# Patient Record
Sex: FEMALE | Race: WHITE | Employment: OTHER | URBAN - METROPOLITAN AREA
[De-identification: names, ages, dates, MRNs, and addresses within clinical notes are randomized per-mention and may not be internally consistent; named-entity substitution may affect disease eponyms.]

---

## 2017-01-04 ENCOUNTER — TELEPHONE (OUTPATIENT)
Dept: SURGERY | Facility: CLINIC | Age: 42
End: 2017-01-04

## 2017-01-04 NOTE — TELEPHONE ENCOUNTER
Patient states that Dr. Princess Mora was going to coordinate surgery with another surgeon.   Per last OV notes, Dr. Princess Mora was to contact Dr. Sofiya Cabrera to get surgery recommendations and get scheduled  Informed patient that I would address this with Dr. Princess Mora

## 2017-01-05 ENCOUNTER — TELEPHONE (OUTPATIENT)
Dept: INTERNAL MEDICINE CLINIC | Facility: CLINIC | Age: 42
End: 2017-01-05

## 2017-01-05 NOTE — TELEPHONE ENCOUNTER
Informed patient that per Dr. Connie Quintanilla, Dr. Rosi Mims does not do the surgery  He is still waiting for Op report from [facility], she states Dr. Henry Meléndez had copy  Nothing seen in patient's chart    Also informed her that per Pain service, all injections have bee

## 2017-01-06 ENCOUNTER — TELEPHONE (OUTPATIENT)
Dept: SURGERY | Facility: CLINIC | Age: 42
End: 2017-01-06

## 2017-01-06 DIAGNOSIS — M46.1 SACROILIITIS, NOT ELSEWHERE CLASSIFIED (HCC): Primary | ICD-10-CM

## 2017-01-06 DIAGNOSIS — M47.819 SPONDYLOSIS WITHOUT MYELOPATHY: ICD-10-CM

## 2017-01-06 DIAGNOSIS — M47.816 FACET ARTHROPATHY, LUMBAR: ICD-10-CM

## 2017-01-06 NOTE — TELEPHONE ENCOUNTER
Patient's bilateral SIJI rescheduled for 2/6/17 at 12:45PM. Patient prefers sedation, pre-procedural instructions reviewed. Patient verbalized understanding. Patient would like Zeb SIJI completed 1/9/17 followed by Left LFJI 1/16/17 and Right LFJI 2/6/17. ? 81mg day of procedure  ? Greater than 81 mg (325mg) 7 days  ? Coumadin Procedure may be cancelled if INR is elevated. ? Epidural ____ - 7 days  ? Others 5 days  ? Excedrin (with aspirin) 7 days  ? Plavix (Clopidogrel)  ? Epidural ____ - 10 days  ?  Othe

## 2017-01-06 NOTE — TELEPHONE ENCOUNTER
Reviewed chart, patient never had SI joint injections  Discussed with Pain Service, they will contact patient to get injections rescheduled.      Patient states she has copies of her medical records from [facility] and will be dropping them off this morning  I in

## 2017-01-09 ENCOUNTER — SURGERY (OUTPATIENT)
Age: 42
End: 2017-01-09

## 2017-01-09 ENCOUNTER — APPOINTMENT (OUTPATIENT)
Dept: GENERAL RADIOLOGY | Facility: HOSPITAL | Age: 42
End: 2017-01-09
Attending: ANESTHESIOLOGY
Payer: MEDICARE

## 2017-01-11 ENCOUNTER — TELEPHONE (OUTPATIENT)
Dept: INTERNAL MEDICINE CLINIC | Facility: CLINIC | Age: 42
End: 2017-01-11

## 2017-01-11 ENCOUNTER — PATIENT OUTREACH (OUTPATIENT)
Dept: INTERNAL MEDICINE CLINIC | Facility: CLINIC | Age: 42
End: 2017-01-11

## 2017-01-11 DIAGNOSIS — I10 ESSENTIAL HYPERTENSION: Primary | ICD-10-CM

## 2017-01-11 RX ORDER — CHLORTHALIDONE 25 MG/1
12.5 TABLET ORAL DAILY
Qty: 15 TABLET | Refills: 1 | Status: SHIPPED | OUTPATIENT
Start: 2017-01-11 | End: 2017-03-03

## 2017-01-11 NOTE — TELEPHONE ENCOUNTER
Spoke with pt for the last week her blood pressure has been elevated. Readings as follows:  's-170's and 's-110's. Pulse has been . Pt also reports headache and lightheadedness.   She is currently taking Amlodipine 10mg daily and elidia

## 2017-01-11 NOTE — TELEPHONE ENCOUNTER
PT STATES SHE HAS BEEN HAVING ELEVATED BLOOD PRESSURE READINGS WONDERING IF SHE NEEDS MEDICATION ADJUSTED. PLEASE ADVISE.

## 2017-01-16 ENCOUNTER — SURGERY (OUTPATIENT)
Age: 42
End: 2017-01-16

## 2017-01-16 ENCOUNTER — APPOINTMENT (OUTPATIENT)
Dept: GENERAL RADIOLOGY | Facility: HOSPITAL | Age: 42
End: 2017-01-16
Attending: ANESTHESIOLOGY
Payer: MEDICARE

## 2017-01-18 ENCOUNTER — TELEPHONE (OUTPATIENT)
Dept: INTERNAL MEDICINE CLINIC | Facility: CLINIC | Age: 42
End: 2017-01-18

## 2017-01-18 ENCOUNTER — TELEPHONE (OUTPATIENT)
Dept: SURGERY | Facility: CLINIC | Age: 42
End: 2017-01-18

## 2017-01-18 ENCOUNTER — TELEPHONE (OUTPATIENT)
Dept: SURGERY | Facility: HOSPITAL | Age: 42
End: 2017-01-18

## 2017-01-18 DIAGNOSIS — M79.5 FOREIGN BODY (FB) IN SOFT TISSUE: Primary | ICD-10-CM

## 2017-01-18 NOTE — TELEPHONE ENCOUNTER
Patient LM on VM stating that she broke out in hives and asthma; she came in contact with shellfish and seafood. Please advise.

## 2017-01-18 NOTE — TELEPHONE ENCOUNTER
Spoke with dr Maverick De La Paz  Pt to see him in consult  Spoke with pt  His office will call her to set up

## 2017-01-18 NOTE — TELEPHONE ENCOUNTER
Spoke with patient and she c/o difficulty breathing, SOB and hives all over body after eating seafood on Sunday. States her breathing problems are worse today, and hives are extremely itchy.      Patient advised to proceed to the emergency room as soon as p

## 2017-01-20 ENCOUNTER — OFFICE VISIT (OUTPATIENT)
Dept: INTERNAL MEDICINE CLINIC | Facility: CLINIC | Age: 42
End: 2017-01-20

## 2017-01-20 ENCOUNTER — APPOINTMENT (OUTPATIENT)
Dept: LAB | Age: 42
End: 2017-01-20
Attending: COLON & RECTAL SURGERY
Payer: MEDICARE

## 2017-01-20 ENCOUNTER — HOSPITAL ENCOUNTER (OUTPATIENT)
Dept: GENERAL RADIOLOGY | Age: 42
Discharge: HOME | End: 2017-01-20
Attending: COLON & RECTAL SURGERY
Payer: MEDICARE

## 2017-01-20 ENCOUNTER — TELEPHONE (OUTPATIENT)
Dept: INTERNAL MEDICINE CLINIC | Facility: CLINIC | Age: 42
End: 2017-01-20

## 2017-01-20 VITALS
HEIGHT: 64 IN | DIASTOLIC BLOOD PRESSURE: 76 MMHG | OXYGEN SATURATION: 97 % | HEART RATE: 80 BPM | SYSTOLIC BLOOD PRESSURE: 124 MMHG | TEMPERATURE: 98 F | WEIGHT: 246 LBS | RESPIRATION RATE: 16 BRPM | BODY MASS INDEX: 42 KG/M2

## 2017-01-20 DIAGNOSIS — E78.5 HYPERLIPIDEMIA WITH TARGET LDL LESS THAN 130: ICD-10-CM

## 2017-01-20 DIAGNOSIS — I10 ESSENTIAL HYPERTENSION: ICD-10-CM

## 2017-01-20 DIAGNOSIS — Z00.00 ENCOUNTER FOR ANNUAL HEALTH EXAMINATION: Primary | ICD-10-CM

## 2017-01-20 DIAGNOSIS — M79.5 FOREIGN BODY (FB) IN SOFT TISSUE: ICD-10-CM

## 2017-01-20 DIAGNOSIS — I10 ESSENTIAL HYPERTENSION: Primary | ICD-10-CM

## 2017-01-20 LAB
ALBUMIN SERPL-MCNC: 3.5 G/DL (ref 3.5–4.8)
ALP LIVER SERPL-CCNC: 93 U/L (ref 37–98)
ALT SERPL-CCNC: 33 U/L (ref 14–54)
AST SERPL-CCNC: 22 U/L (ref 15–41)
BILIRUB SERPL-MCNC: 0.2 MG/DL (ref 0.1–2)
BILIRUB UR QL STRIP.AUTO: NEGATIVE
BUN BLD-MCNC: 30 MG/DL (ref 8–20)
CALCIUM BLD-MCNC: 8.6 MG/DL (ref 8.3–10.3)
CHLORIDE: 106 MMOL/L (ref 101–111)
CHOLEST SMN-MCNC: 193 MG/DL (ref ?–200)
CO2: 25 MMOL/L (ref 22–32)
COLOR UR AUTO: YELLOW
CREAT BLD-MCNC: 1.13 MG/DL (ref 0.55–1.02)
GLUCOSE BLD-MCNC: 88 MG/DL (ref 70–99)
GLUCOSE UR STRIP.AUTO-MCNC: NEGATIVE MG/DL
HDLC SERPL-MCNC: 65 MG/DL (ref 45–?)
HDLC SERPL: 2.97 {RATIO} (ref ?–4.44)
KETONES UR STRIP.AUTO-MCNC: NEGATIVE MG/DL
LDLC SERPL CALC-MCNC: 87 MG/DL (ref ?–130)
LEUKOCYTE ESTERASE UR QL STRIP.AUTO: NEGATIVE
M PROTEIN MFR SERPL ELPH: 7 G/DL (ref 6.1–8.3)
NITRITE UR QL STRIP.AUTO: NEGATIVE
NONHDLC SERPL-MCNC: 128 MG/DL (ref ?–130)
PH UR STRIP.AUTO: 6 [PH] (ref 4.5–8)
POTASSIUM SERPL-SCNC: 3.7 MMOL/L (ref 3.6–5.1)
PROT UR STRIP.AUTO-MCNC: NEGATIVE MG/DL
RBC UR QL AUTO: NEGATIVE
SODIUM SERPL-SCNC: 139 MMOL/L (ref 136–144)
SP GR UR STRIP.AUTO: 1.02 (ref 1–1.03)
TRIGLYCERIDES: 207 MG/DL (ref ?–150)
UROBILINOGEN UR STRIP.AUTO-MCNC: <2 MG/DL
VLDL: 41 MG/DL (ref 5–40)

## 2017-01-20 PROCEDURE — 80061 LIPID PANEL: CPT

## 2017-01-20 PROCEDURE — 80053 COMPREHEN METABOLIC PANEL: CPT

## 2017-01-20 PROCEDURE — 74020 XR ABDOMEN, OBSTRUCTIVE SERIES (CPT=74020): CPT

## 2017-01-20 PROCEDURE — G0439 PPPS, SUBSEQ VISIT: HCPCS | Performed by: PHYSICIAN ASSISTANT

## 2017-01-20 PROCEDURE — 81003 URINALYSIS AUTO W/O SCOPE: CPT

## 2017-01-20 PROCEDURE — 36415 COLL VENOUS BLD VENIPUNCTURE: CPT

## 2017-01-20 PROCEDURE — G0444 DEPRESSION SCREEN ANNUAL: HCPCS | Performed by: PHYSICIAN ASSISTANT

## 2017-01-20 NOTE — PROGRESS NOTES
HPI:   Claire Vela is a 37year old female who presents for a Medicare Subsequent Annual Wellness visit (Pt already had Initial Annual Wellness). Pt had allergic reaction earlier this week to shellfish, is feeling better now.  Asthma stil neurosurgery and consult with general surgery 1/23 to plan surgical removal     Abnormal CT of the abdomen- d/t suspected foreign body from prior surgery; has discussed with neurosurgery and consult with general surgery 1/23 to plan surgical removal      L disorder; Family history of congenital heart defect (8/29/2014); and Asthma.     She  has past surgical history that includes back surgery (03/17/2010); cholecystectomy (2008); d & c (10/2009 & 03/2010); appendectomy; removal of kidney stone; other (1/25/20 Normocephalic, without obvious abnormality, atraumatic   Eyes:  PERRL, conjunctiva/corneas clear, EOM's intact both eyes   Ears:  Normal TM's and external ear canals, both ears   Nose: Nares normal, septum midline,mucosa normal, no drainage or sinus tender Alejandra Epps is unable to use daily aspirin therapy For the following reasons:   Regular use of NSAIDs          Diet assessment: good     Advanced Directive:  Living Will on file in [facility]? Sanam Valdez does not have a Living Will on file in [facility]  Erica Mariee plan.    Return in about 6 weeks (around 3/3/2017). for BP follow-up and asthma recheck.      Joy Mishra PA-C, 1/20/2017     General Health     In the past six months, have you lost more than 10 pounds without trying?: 2 - No    Has your appetite be pleasure in doing things (over the last two weeks)?: Several days    Feeling down, depressed, or hopeless (over the last two weeks)?: Several days    PHQ-2 SCORE: 2        Advance Directives     Do you have a healthcare power of ?: Yes    Do you ha previous visit. No flowsheet data found.     Pap and Pelvic      Pap: Every 3 yrs age 21-65 or Pap+HPV every 5 yrs age 33-67, age 72 and older at high risk Pap Smear,5 Years due on 05/09/2021 Update Health Maintenance if applicable    Chlamydia  Annually if Annually      LDL  Annually LDL CHOLESTEROL (mg/dL)   Date Value   12/14/2015 135*     LDL CHOLESTROL (mg/dL)   Date Value   01/07/2013 146*    No flowsheet data found. Dilated Eye exam  Annually No flowsheet data found. No flowsheet data found.     CO

## 2017-01-23 ENCOUNTER — TELEPHONE (OUTPATIENT)
Dept: INTERNAL MEDICINE CLINIC | Facility: CLINIC | Age: 42
End: 2017-01-23

## 2017-01-23 ENCOUNTER — OFFICE VISIT (OUTPATIENT)
Dept: SURGERY | Facility: CLINIC | Age: 42
End: 2017-01-23

## 2017-01-23 VITALS — BODY MASS INDEX: 42.68 KG/M2 | WEIGHT: 250 LBS | HEIGHT: 64 IN

## 2017-01-23 DIAGNOSIS — G89.29 ABDOMINAL PAIN, CHRONIC, LEFT LOWER QUADRANT: ICD-10-CM

## 2017-01-23 DIAGNOSIS — R93.5 ABNORMAL CT OF THE ABDOMEN: ICD-10-CM

## 2017-01-23 DIAGNOSIS — M79.5 FOREIGN BODY (FB) IN SOFT TISSUE: Primary | ICD-10-CM

## 2017-01-23 DIAGNOSIS — R10.32 ABDOMINAL PAIN, CHRONIC, LEFT LOWER QUADRANT: ICD-10-CM

## 2017-01-23 DIAGNOSIS — R79.89 ELEVATED SERUM CREATININE: Primary | ICD-10-CM

## 2017-01-23 PROCEDURE — 99215 OFFICE O/P EST HI 40 MIN: CPT | Performed by: COLON & RECTAL SURGERY

## 2017-01-23 NOTE — H&P
New Patient Visit Note       Active Problems      1. Foreign body (FB) in soft tissue    2. Abnormal CT of the abdomen    3.  Abdominal pain, chronic, left lower quadrant        Chief Complaint   Back Pain    History of Present Illness   I have been asked t placed at the time of that operation. There appears to be calcifications within the soft tissues that extend onto the level of the anterior psoas muscle. There is no obvious identifier for a laparotomy sponge or Ray-Jerald.   There is evidence of new bone fo Bowel gas pattern is non-obstructive. No joint spinal stimulator device. Postoperative changes with lumbosacral fusion. There is slight irregularity noted along the left aspect of the L5 vertebral body.  This could be postsurgical in nature but could   ,     Comment x2    TUBAL LIGATION      OTHER  16    Comment REPLACEMENT OF SPINAL CORD STIMULATOR GENERATOR       The family history and social history have been reviewed by me today.     Family History   Problem Relation Age of Ons mg by mouth every 6 (six) hours as needed for Pain. Disp:  Rfl:          Review of Systems  The Review of Systems has been reviewed by me during today.   Review of Systems   Constitutional: Negative for fever, chills, diaphoresis, fatigue and unexpected pipe significant past medical and surgical history. There is the possibility of a retained sponge that is in the retroperitoneum.   There is concern that the mobilization of the bowel and the disruption of the mesentery necessary to retrieve or explore this reg visit.  I have personally reviewed the obstructive series that was performed at [facility].  This test was obtained on January 20, 2017. It shows no evidence of a radiologic marker that is embedded on all surgical sponges and Ray-Jerald.   I provided my SPINE (3 VIEWS) (CPT=72072)  XR LUMBAR SPINE (MIN 4 VIEWS) (CPT=72110)    Follow Up  Return if symptoms worsen or fail to improve.     Ora Cowden, MD

## 2017-01-23 NOTE — TELEPHONE ENCOUNTER
----- Message from Papi Rai PA-C sent at 1/23/2017  9:51 AM CST -----  Pt informed via [employer]. Please place order for BMP to be repeated in 6 wks.

## 2017-01-23 NOTE — PATIENT INSTRUCTIONS
I have been asked to consult on this patient by the neurosurgery service and the primary care service. I am seeing this patient in the role of a colon and rectal surgeon. This patient presents with a very significant past medical and surgical history. of new bone formation and abnormal regions most likely based on the material that was used to induce a bone graft at the site of her spinal surgery. I personally ordered an obstructive series prior to this visit.   I have personally reviewed the obstruct She will only need a follow-up visit if she has evidence of a retained surgical device.

## 2017-01-23 NOTE — TELEPHONE ENCOUNTER
D/w pt results:  cholesterol is fairly well controlled (watch fried/greasy foods for elevated triglycerides). Your urinalysis is unremarkable, and your metabolic panel looks good other than a slightly elevated creatinine level (kidney function).  This could

## 2017-01-24 ENCOUNTER — PATIENT OUTREACH (OUTPATIENT)
Dept: CASE MANAGEMENT | Age: 42
End: 2017-01-24

## 2017-01-24 DIAGNOSIS — J45.50 UNCOMPLICATED SEVERE PERSISTENT ASTHMA: ICD-10-CM

## 2017-01-24 DIAGNOSIS — I10 ESSENTIAL HYPERTENSION: Primary | ICD-10-CM

## 2017-01-24 DIAGNOSIS — E78.5 HYPERLIPIDEMIA WITH TARGET LDL LESS THAN 130: ICD-10-CM

## 2017-01-24 PROCEDURE — 99490 CHRNC CARE MGMT STAFF 1ST 20: CPT

## 2017-01-24 NOTE — PROGRESS NOTES
Spoke to pt for CCM today. Pt states she has been very busy especially on Monday and Tuesday. Pt states she is leaving to take her son to meeting. Advised pt will call back another time. 01/27/17 Spoke to pt as f/up for CCM today.   Pt states she is d

## 2017-02-06 ENCOUNTER — TELEPHONE (OUTPATIENT)
Dept: SURGERY | Facility: CLINIC | Age: 42
End: 2017-02-06

## 2017-02-06 ENCOUNTER — APPOINTMENT (OUTPATIENT)
Dept: GENERAL RADIOLOGY | Facility: HOSPITAL | Age: 42
End: 2017-02-06
Attending: ANESTHESIOLOGY
Payer: MEDICARE

## 2017-02-06 ENCOUNTER — SURGERY (OUTPATIENT)
Age: 42
End: 2017-02-06

## 2017-02-06 ENCOUNTER — HOSPITAL ENCOUNTER (OUTPATIENT)
Facility: HOSPITAL | Age: 42
Setting detail: HOSPITAL OUTPATIENT SURGERY
Discharge: HOME | End: 2017-02-06
Attending: ANESTHESIOLOGY | Admitting: ANESTHESIOLOGY
Payer: MEDICARE

## 2017-02-06 VITALS
HEART RATE: 80 BPM | OXYGEN SATURATION: 95 % | DIASTOLIC BLOOD PRESSURE: 58 MMHG | SYSTOLIC BLOOD PRESSURE: 101 MMHG | TEMPERATURE: 98 F | RESPIRATION RATE: 18 BRPM

## 2017-02-06 DIAGNOSIS — M47.819 SPONDYLOSIS WITHOUT MYELOPATHY: ICD-10-CM

## 2017-02-06 DIAGNOSIS — M47.816 FACET ARTHROPATHY, LUMBAR: ICD-10-CM

## 2017-02-06 LAB
POCT LOT NUMBER: NORMAL
POCT URINE PREGNANCY: NEGATIVE

## 2017-02-06 PROCEDURE — 81025 URINE PREGNANCY TEST: CPT | Performed by: ANESTHESIOLOGY

## 2017-02-06 PROCEDURE — 3E0U3BZ INTRODUCTION OF ANESTHETIC AGENT INTO JOINTS, PERCUTANEOUS APPROACH: ICD-10-PCS | Performed by: ANESTHESIOLOGY

## 2017-02-06 PROCEDURE — 99152 MOD SED SAME PHYS/QHP 5/>YRS: CPT | Performed by: ANESTHESIOLOGY

## 2017-02-06 PROCEDURE — 3E0U33Z INTRODUCTION OF ANTI-INFLAMMATORY INTO JOINTS, PERCUTANEOUS APPROACH: ICD-10-PCS | Performed by: ANESTHESIOLOGY

## 2017-02-06 RX ORDER — METHYLPREDNISOLONE ACETATE 40 MG/ML
INJECTION, SUSPENSION INTRA-ARTICULAR; INTRALESIONAL; INTRAMUSCULAR; SOFT TISSUE AS NEEDED
Status: DISCONTINUED | OUTPATIENT
Start: 2017-02-06 | End: 2017-02-06 | Stop reason: HOSPADM

## 2017-02-06 RX ORDER — LIDOCAINE HYDROCHLORIDE 10 MG/ML
INJECTION, SOLUTION EPIDURAL; INFILTRATION; INTRACAUDAL; PERINEURAL AS NEEDED
Status: DISCONTINUED | OUTPATIENT
Start: 2017-02-06 | End: 2017-02-06 | Stop reason: HOSPADM

## 2017-02-06 RX ORDER — SODIUM CHLORIDE, SODIUM LACTATE, POTASSIUM CHLORIDE, CALCIUM CHLORIDE 600; 310; 30; 20 MG/100ML; MG/100ML; MG/100ML; MG/100ML
100 INJECTION, SOLUTION INTRAVENOUS CONTINUOUS
Status: DISCONTINUED | OUTPATIENT
Start: 2017-02-06 | End: 2017-02-06

## 2017-02-06 NOTE — TELEPHONE ENCOUNTER
Patient notified me in procedure area that she needs a refill of Norco, I informed her I would pass along her request. She is aware of the 48 hour turnaround. Would you please begin the process.    Thanks

## 2017-02-06 NOTE — H&P
History & Physical Examination    Patient Name: Estrella Solis  MRN: [id number]  CSN: [id number]  YOB: 1975    Pre-Operative Diagnosis:  Spondylosis without myelopathy [M19.90]  Facet arthropathy, lumbar [M12.88]    Present Illness: history of congenital heart defect 8/29/2014     Father of baby with congenital heart defect. Plan fetal echo 22-24 weeks.     • Asthma          Past Surgical History    BACK SURGERY  03/17/2010    Comment L4-L5, L5-S1 anterior lumbar interbody fusion    [facility]

## 2017-02-06 NOTE — TELEPHONE ENCOUNTER
Patient informed of 48 hour refill policy excluding weekends and holidays. Further explained patient will not receive a call back once prescription is ready. Patient will  medication at [place] office, [address].

## 2017-02-06 NOTE — OPERATIVE REPORT
[facility]  Operative Report  2017     Marlon Li Patient Status:  Hospital Outpatient Surgery    1975 MRN [id number]   Location [address] [place] [address] Attending No att. providers found   Hosp Day # 0 needle was introduced and advanced into each corresponding facet joint at left L2-L3, L3-L4, L4-5 and L5-S1 level atraumatically under fluoroscopic guidance.   Following negative aspiration for CSF and blood, approximately 1 mL of 1% lidocaine with 20 mg of

## 2017-02-07 NOTE — TELEPHONE ENCOUNTER
Medication: Hydrocodone-Acetaminophen  MG    Date of last refill: 11/28/16  [place] Review[place] 7/40/17    Last office visit: 11/28/16  Due back to clinic per last office note:  3 months  Date next office visit schedu

## 2017-02-08 RX ORDER — HYDROCODONE BITARTRATE AND ACETAMINOPHEN 10; 325 MG/1; MG/1
1 TABLET ORAL EVERY 6 HOURS PRN
Qty: 90 TABLET | Refills: 0 | Status: SHIPPED | OUTPATIENT
Start: 2017-02-08 | End: 2017-03-27

## 2017-02-16 ENCOUNTER — PATIENT OUTREACH (OUTPATIENT)
Dept: CASE MANAGEMENT | Age: 42
End: 2017-02-16

## 2017-02-16 DIAGNOSIS — I10 ESSENTIAL HYPERTENSION: Primary | ICD-10-CM

## 2017-02-16 DIAGNOSIS — J45.50 UNCOMPLICATED SEVERE PERSISTENT ASTHMA: ICD-10-CM

## 2017-02-16 DIAGNOSIS — E78.5 HYPERLIPIDEMIA WITH TARGET LDL LESS THAN 130: ICD-10-CM

## 2017-02-16 PROCEDURE — 99490 CHRNC CARE MGMT STAFF 1ST 20: CPT

## 2017-02-16 NOTE — PROGRESS NOTES
Spoke to pt for CCM today. Pt states she is doing ok. Pt states has an appt with  today for check up on her asthma. Pt states her surgery with Juliana Drop is postponed until after tax season as her spouse does that for a living.   Pt states she

## 2017-02-22 ENCOUNTER — TELEPHONE (OUTPATIENT)
Dept: SURGERY | Facility: CLINIC | Age: 42
End: 2017-02-22

## 2017-02-22 RX ORDER — METHOCARBAMOL 500 MG/1
500 TABLET, FILM COATED ORAL EVERY 8 HOURS PRN
Qty: 45 TABLET | Refills: 0 | Status: SHIPPED | OUTPATIENT
Start: 2017-02-22 | End: 2017-03-24

## 2017-02-22 NOTE — TELEPHONE ENCOUNTER
I have sent a script for methocarbamol 500mg one tablet every 8 hours if needed for muscle spasm to her pharmacy, otherwise I agree that she should meet with Mikey Fernández to see what else he can do.

## 2017-02-22 NOTE — TELEPHONE ENCOUNTER
If she got excellent  Relief of this pain with the sacroiliac joint injection, but now the pain is starting to return,I would recommend RFA right SI joint. Would you ask her the percentage of relief she obtained after SI joint injection and for how long.  I

## 2017-02-22 NOTE — TELEPHONE ENCOUNTER
Spoke with patient about provider recommendations below. Patient states she had minimal relief from [facility], states maybe 10% if that. Spoke with A. Donnella Buerger, who advised to have patient contact [employer] for assistance with SCS, as well as see about coming i

## 2017-02-22 NOTE — TELEPHONE ENCOUNTER
Patient reporting lower back pain, right above tailbone and to the right, radiating into right leg with muscle spasms/cramps into right leg. Patient states these are recurrent symptoms but now more intense. Symptoms worsening over last week.  Has increased

## 2017-02-23 NOTE — TELEPHONE ENCOUNTER
Pt scheduled fup appt 2/24 with Dr Larry Moura in [place] [id number]- reexperiencing back pain after 2/6 injection

## 2017-02-24 ENCOUNTER — OFFICE VISIT (OUTPATIENT)
Dept: SURGERY | Facility: CLINIC | Age: 42
End: 2017-02-24

## 2017-02-24 VITALS
HEART RATE: 66 BPM | WEIGHT: 250 LBS | BODY MASS INDEX: 42.68 KG/M2 | DIASTOLIC BLOOD PRESSURE: 72 MMHG | RESPIRATION RATE: 16 BRPM | SYSTOLIC BLOOD PRESSURE: 120 MMHG | HEIGHT: 64 IN

## 2017-02-24 DIAGNOSIS — M54.16 LUMBAR RADICULOPATHY: Primary | ICD-10-CM

## 2017-02-24 PROCEDURE — 99214 OFFICE O/P EST MOD 30 MIN: CPT | Performed by: ANESTHESIOLOGY

## 2017-02-24 RX ORDER — TIOTROPIUM BROMIDE INHALATION SPRAY 1.56 UG/1
SPRAY, METERED RESPIRATORY (INHALATION)
Refills: 0 | COMMUNITY
Start: 2017-02-17 | End: 2018-08-31

## 2017-02-24 NOTE — PATIENT INSTRUCTIONS
Refill policies:    • Allow 2 business days for refills; controlled substances may take longer.   • Contact your pharmacy at least 5 days prior to running out of medication and have them send an electronic request or submit request through the “request re your physician has recommended that you have a procedure or additional testing performed. [facility] will contact your insurance carrier to obtain pre-certification or prior authorization.     Unfortunately, [facility] has seen an increas tori.    Medication:   Number of days you need to be off for the following medications:  • Aggrenox 10 days   • Agrylin (Anagrelide) 10 days   • Enbrel (Etanercept) 24 hours   • Fragmin (Dalteparin) 24 hours   • Pletal (Cilostazol) 7 days  • Pradaxa 10 da your primary care physician if your blood sugar rises as you may require some medication adjustment.   It is normal to have increased pain at injection site for up to 3-5 days after procedure, you can use heat for the first 24 hours (20 minutes on 20 minute

## 2017-03-03 ENCOUNTER — APPOINTMENT (OUTPATIENT)
Dept: LAB | Age: 42
End: 2017-03-03
Attending: PHYSICIAN ASSISTANT
Payer: MEDICARE

## 2017-03-03 ENCOUNTER — OFFICE VISIT (OUTPATIENT)
Dept: INTERNAL MEDICINE CLINIC | Facility: CLINIC | Age: 42
End: 2017-03-03

## 2017-03-03 VITALS
SYSTOLIC BLOOD PRESSURE: 120 MMHG | HEIGHT: 64 IN | BODY MASS INDEX: 42.68 KG/M2 | TEMPERATURE: 98 F | DIASTOLIC BLOOD PRESSURE: 82 MMHG | WEIGHT: 250 LBS | RESPIRATION RATE: 16 BRPM | HEART RATE: 92 BPM

## 2017-03-03 DIAGNOSIS — R79.89 ELEVATED SERUM CREATININE: ICD-10-CM

## 2017-03-03 DIAGNOSIS — F41.1 GENERALIZED ANXIETY DISORDER: ICD-10-CM

## 2017-03-03 DIAGNOSIS — I10 ESSENTIAL HYPERTENSION: Primary | ICD-10-CM

## 2017-03-03 DIAGNOSIS — J45.50 UNCOMPLICATED SEVERE PERSISTENT ASTHMA: ICD-10-CM

## 2017-03-03 LAB
BUN BLD-MCNC: 12 MG/DL (ref 8–20)
CALCIUM BLD-MCNC: 9 MG/DL (ref 8.3–10.3)
CHLORIDE: 106 MMOL/L (ref 101–111)
CO2: 26 MMOL/L (ref 22–32)
CREAT BLD-MCNC: 0.99 MG/DL (ref 0.55–1.02)
GLUCOSE BLD-MCNC: 93 MG/DL (ref 70–99)
POTASSIUM SERPL-SCNC: 3.5 MMOL/L (ref 3.6–5.1)
SODIUM SERPL-SCNC: 139 MMOL/L (ref 136–144)

## 2017-03-03 PROCEDURE — 99214 OFFICE O/P EST MOD 30 MIN: CPT | Performed by: PHYSICIAN ASSISTANT

## 2017-03-03 PROCEDURE — 36415 COLL VENOUS BLD VENIPUNCTURE: CPT

## 2017-03-03 PROCEDURE — 80048 BASIC METABOLIC PNL TOTAL CA: CPT

## 2017-03-03 RX ORDER — ALPRAZOLAM 0.25 MG/1
0.25 TABLET ORAL EVERY 6 HOURS PRN
Qty: 30 TABLET | Refills: 1 | Status: SHIPPED | OUTPATIENT
Start: 2017-03-03 | End: 2017-08-21

## 2017-03-03 RX ORDER — CHLORTHALIDONE 25 MG/1
12.5 TABLET ORAL DAILY
Qty: 15 TABLET | Refills: 1 | Status: SHIPPED | OUTPATIENT
Start: 2017-03-03 | End: 2017-12-29

## 2017-03-03 NOTE — PROGRESS NOTES
Hero Acuna is a 40year old female. HPI:   The patient presents for recheck of asthma sx's. Lately the patient's asthma has been  under fair control.  When symptoms occur they are described as wheezing, non-productive cough and chest tightne unspecified    • Pain in joint, pelvic region and thigh      in hip   • Menorrhagia    • Pneumonia, organism unspecified    • Migraines    • GENITO-URINARY DISEASE      KIDNEY STONES   • Depression    • Psychiatric disorder      ANXIETY   • Family history months. F/u 3 mos for recheck.  -elevated creatinine: recheck BMP today   -continue prn alprazolam for anxiety.  Discussed medication indications, risks, alternatives and side effects including drowsiness and abuse potential  -reviewed meal planning and man

## 2017-03-03 NOTE — PATIENT INSTRUCTIONS
Continue current medications  Try to eat something (small meal or snack) every 4 hours during the daytime and avoid large dinner. Continue to aim for 0546-3596 Calories per day, and 10,000 steps per day.

## 2017-03-04 NOTE — PROGRESS NOTES
Name: Da Rico   : 1975   DOS: 2017     Pain Clinic Follow Up Visit:   Da Rico is a 40year old female who presents for recheck of her chronic low back pain and lower thoracic pain.   She is status post bilate Sodium (SINGULAIR) 10 MG Oral Tab TAKE 1 TABLET BY MOUTH NIGHTLY (Patient taking differently: TAKE 1 TABLET BY MOUTH morning) Disp: 90 tablet Rfl: 1   chlorthalidone 25 MG Oral Tab Take 0.5 tablets (12.5 mg total) by mouth daily.  Disp: 15 tablet Rfl: 1   a

## 2017-03-23 ENCOUNTER — PATIENT OUTREACH (OUTPATIENT)
Dept: CASE MANAGEMENT | Age: 42
End: 2017-03-23

## 2017-03-23 ENCOUNTER — HOSPITAL ENCOUNTER (OUTPATIENT)
Facility: HOSPITAL | Age: 42
Setting detail: HOSPITAL OUTPATIENT SURGERY
Discharge: HOME | End: 2017-03-23
Attending: ANESTHESIOLOGY | Admitting: ANESTHESIOLOGY
Payer: MEDICARE

## 2017-03-23 ENCOUNTER — SURGERY (OUTPATIENT)
Age: 42
End: 2017-03-23

## 2017-03-23 ENCOUNTER — APPOINTMENT (OUTPATIENT)
Dept: GENERAL RADIOLOGY | Facility: HOSPITAL | Age: 42
End: 2017-03-23
Attending: ANESTHESIOLOGY
Payer: MEDICARE

## 2017-03-23 VITALS
DIASTOLIC BLOOD PRESSURE: 146 MMHG | RESPIRATION RATE: 16 BRPM | OXYGEN SATURATION: 97 % | TEMPERATURE: 98 F | SYSTOLIC BLOOD PRESSURE: 163 MMHG | HEART RATE: 85 BPM

## 2017-03-23 DIAGNOSIS — M54.16 LUMBAR RADICULOPATHY: ICD-10-CM

## 2017-03-23 LAB
POCT LOT NUMBER: NORMAL
POCT URINE PREGNANCY: NEGATIVE

## 2017-03-23 PROCEDURE — 3E0R3BZ INTRODUCTION OF ANESTHETIC AGENT INTO SPINAL CANAL, PERCUTANEOUS APPROACH: ICD-10-PCS | Performed by: ANESTHESIOLOGY

## 2017-03-23 PROCEDURE — 3E0R33Z INTRODUCTION OF ANTI-INFLAMMATORY INTO SPINAL CANAL, PERCUTANEOUS APPROACH: ICD-10-PCS | Performed by: ANESTHESIOLOGY

## 2017-03-23 PROCEDURE — 81025 URINE PREGNANCY TEST: CPT | Performed by: ANESTHESIOLOGY

## 2017-03-23 PROCEDURE — 99152 MOD SED SAME PHYS/QHP 5/>YRS: CPT | Performed by: ANESTHESIOLOGY

## 2017-03-23 RX ORDER — LIDOCAINE HYDROCHLORIDE 10 MG/ML
INJECTION, SOLUTION EPIDURAL; INFILTRATION; INTRACAUDAL; PERINEURAL AS NEEDED
Status: DISCONTINUED | OUTPATIENT
Start: 2017-03-23 | End: 2017-03-23 | Stop reason: HOSPADM

## 2017-03-23 RX ORDER — SODIUM CHLORIDE, SODIUM LACTATE, POTASSIUM CHLORIDE, CALCIUM CHLORIDE 600; 310; 30; 20 MG/100ML; MG/100ML; MG/100ML; MG/100ML
100 INJECTION, SOLUTION INTRAVENOUS CONTINUOUS
Status: DISCONTINUED | OUTPATIENT
Start: 2017-03-23 | End: 2017-03-23

## 2017-03-23 RX ORDER — DEXAMETHASONE SODIUM PHOSPHATE 10 MG/ML
INJECTION, SOLUTION INTRAMUSCULAR; INTRAVENOUS AS NEEDED
Status: DISCONTINUED | OUTPATIENT
Start: 2017-03-23 | End: 2017-03-23 | Stop reason: HOSPADM

## 2017-03-23 NOTE — H&P
History & Physical Examination    Patient Name: Princess Chan  MRN: [id number]  [facility]: [id number]  YOB: 1975    Pre-Operative Diagnosis:  Lumbar radiculopathy [M54.16]    Present Illness: A 40year old female with low back pain is he Diagnosis Date   • Extrinsic asthma, unspecified    • Pain in joint, pelvic region and thigh      in hip   • Menorrhagia    • Pneumonia, organism unspecified    • Migraines    • GENITO-URINARY DISEASE      KIDNEY STONES   • Depression    • Psychiatric di History and Physical done within the last 30 days. Any changes noted above.     BYRON Colon

## 2017-03-24 NOTE — TELEPHONE ENCOUNTER
Patient requests refill on Norco.     Please document last dose pain medication, have pt complete Narcotic agreement, and supply urine specimen prior to Rx pickup. Upload snapshot with future recommended Urine screening date. Thank you.

## 2017-03-27 NOTE — TELEPHONE ENCOUNTER
**PATIENT NEEDS NARCOTIC CONTRACT ON FILE**    Medication: Hydrocodone-Acetaminophen  MG    Date of last refill: 2/8/17  [place] Reviewed 51/50/48    Last office visit: 2/24/17  Due back to clinic per last office n

## 2017-03-28 NOTE — TELEPHONE ENCOUNTER
Script ready for  at   RN needed                The following documentation was provided to patients at time of Rx pickup:      From the Office of Dr. Eh Monreal at [facility]:  [address] [facility] office is committed to providing the best ca

## 2017-04-06 ENCOUNTER — SURGERY (OUTPATIENT)
Age: 42
End: 2017-04-06

## 2017-04-06 ENCOUNTER — APPOINTMENT (OUTPATIENT)
Dept: GENERAL RADIOLOGY | Facility: HOSPITAL | Age: 42
End: 2017-04-06
Attending: ANESTHESIOLOGY
Payer: MEDICARE

## 2017-04-06 ENCOUNTER — HOSPITAL ENCOUNTER (OUTPATIENT)
Facility: HOSPITAL | Age: 42
Setting detail: HOSPITAL OUTPATIENT SURGERY
Discharge: HOME | End: 2017-04-06
Attending: ANESTHESIOLOGY | Admitting: ANESTHESIOLOGY
Payer: MEDICARE

## 2017-04-06 VITALS
HEART RATE: 99 BPM | SYSTOLIC BLOOD PRESSURE: 131 MMHG | TEMPERATURE: 98 F | RESPIRATION RATE: 18 BRPM | DIASTOLIC BLOOD PRESSURE: 86 MMHG | OXYGEN SATURATION: 97 %

## 2017-04-06 DIAGNOSIS — M54.16 LUMBAR RADICULOPATHY: ICD-10-CM

## 2017-04-06 PROCEDURE — 3E0R3BZ INTRODUCTION OF ANESTHETIC AGENT INTO SPINAL CANAL, PERCUTANEOUS APPROACH: ICD-10-PCS | Performed by: ANESTHESIOLOGY

## 2017-04-06 PROCEDURE — 99152 MOD SED SAME PHYS/QHP 5/>YRS: CPT | Performed by: ANESTHESIOLOGY

## 2017-04-06 PROCEDURE — 3E0R33Z INTRODUCTION OF ANTI-INFLAMMATORY INTO SPINAL CANAL, PERCUTANEOUS APPROACH: ICD-10-PCS | Performed by: ANESTHESIOLOGY

## 2017-04-06 PROCEDURE — 81025 URINE PREGNANCY TEST: CPT | Performed by: ANESTHESIOLOGY

## 2017-04-06 RX ORDER — SODIUM CHLORIDE, SODIUM LACTATE, POTASSIUM CHLORIDE, CALCIUM CHLORIDE 600; 310; 30; 20 MG/100ML; MG/100ML; MG/100ML; MG/100ML
100 INJECTION, SOLUTION INTRAVENOUS CONTINUOUS
Status: DISCONTINUED | OUTPATIENT
Start: 2017-04-06 | End: 2017-04-06

## 2017-04-06 RX ORDER — DEXAMETHASONE SODIUM PHOSPHATE 10 MG/ML
INJECTION, SOLUTION INTRAMUSCULAR; INTRAVENOUS AS NEEDED
Status: DISCONTINUED | OUTPATIENT
Start: 2017-04-06 | End: 2017-04-06 | Stop reason: HOSPADM

## 2017-04-06 RX ORDER — LIDOCAINE HYDROCHLORIDE 10 MG/ML
INJECTION, SOLUTION EPIDURAL; INFILTRATION; INTRACAUDAL; PERINEURAL AS NEEDED
Status: DISCONTINUED | OUTPATIENT
Start: 2017-04-06 | End: 2017-04-06 | Stop reason: HOSPADM

## 2017-04-06 NOTE — OPERATIVE REPORT
[facility]  Operative Report  2017     Corinne Lora Patient Status:  Hospital Outpatient Surgery    1975 MRN [id number]   [facility] SURGERY Attending Armand Matson MD   Hosp Day # 0 PCP Molly Cheadle, MD     I aspect of the junction between the transverse process and pedicle of the left L3-L4 level atraumatically under fluoroscopic guidance. The needle was advanced into the anterior epidural space at this level.   The needle position was confirmed under AP and l

## 2017-04-06 NOTE — H&P
History & Physical Examination    Patient Name: Kwesi Thao  MRN: [id number]  CSN: [id number]  YOB: 1975    Pre-Operative Diagnosis:  Lumbar radiculopathy [M54.16]    Present Illness: A 40year old female with low back pain is he unspecified    • Pain in joint, pelvic region and thigh      in hip   • Menorrhagia    • Pneumonia, organism unspecified    • Migraines    • GENITO-URINARY DISEASE      KIDNEY STONES   • Depression    • Psychiatric disorder      ANXIETY   • Family history Kvng Mascorro, GLENROYN

## 2017-04-18 ENCOUNTER — APPOINTMENT (OUTPATIENT)
Dept: GENERAL RADIOLOGY | Facility: HOSPITAL | Age: 42
End: 2017-04-18
Attending: ANESTHESIOLOGY
Payer: MEDICARE

## 2017-04-18 ENCOUNTER — HOSPITAL ENCOUNTER (OUTPATIENT)
Facility: HOSPITAL | Age: 42
Setting detail: HOSPITAL OUTPATIENT SURGERY
Discharge: HOME | End: 2017-04-18
Attending: ANESTHESIOLOGY | Admitting: ANESTHESIOLOGY
Payer: MEDICARE

## 2017-04-18 ENCOUNTER — SURGERY (OUTPATIENT)
Age: 42
End: 2017-04-18

## 2017-04-18 VITALS
DIASTOLIC BLOOD PRESSURE: 105 MMHG | OXYGEN SATURATION: 97 % | HEART RATE: 86 BPM | RESPIRATION RATE: 18 BRPM | SYSTOLIC BLOOD PRESSURE: 146 MMHG | TEMPERATURE: 98 F

## 2017-04-18 DIAGNOSIS — M54.16 LUMBAR RADICULOPATHY: ICD-10-CM

## 2017-04-18 PROCEDURE — 3E0R3BZ INTRODUCTION OF ANESTHETIC AGENT INTO SPINAL CANAL, PERCUTANEOUS APPROACH: ICD-10-PCS | Performed by: ANESTHESIOLOGY

## 2017-04-18 PROCEDURE — 99152 MOD SED SAME PHYS/QHP 5/>YRS: CPT | Performed by: ANESTHESIOLOGY

## 2017-04-18 PROCEDURE — 3E0R33Z INTRODUCTION OF ANTI-INFLAMMATORY INTO SPINAL CANAL, PERCUTANEOUS APPROACH: ICD-10-PCS | Performed by: ANESTHESIOLOGY

## 2017-04-18 RX ORDER — DEXAMETHASONE SODIUM PHOSPHATE 10 MG/ML
INJECTION, SOLUTION INTRAMUSCULAR; INTRAVENOUS AS NEEDED
Status: DISCONTINUED | OUTPATIENT
Start: 2017-04-18 | End: 2017-04-18 | Stop reason: HOSPADM

## 2017-04-18 RX ORDER — LIDOCAINE HYDROCHLORIDE 10 MG/ML
INJECTION, SOLUTION EPIDURAL; INFILTRATION; INTRACAUDAL; PERINEURAL AS NEEDED
Status: DISCONTINUED | OUTPATIENT
Start: 2017-04-18 | End: 2017-04-18 | Stop reason: HOSPADM

## 2017-04-18 RX ORDER — SODIUM CHLORIDE, SODIUM LACTATE, POTASSIUM CHLORIDE, CALCIUM CHLORIDE 600; 310; 30; 20 MG/100ML; MG/100ML; MG/100ML; MG/100ML
100 INJECTION, SOLUTION INTRAVENOUS CONTINUOUS
Status: DISCONTINUED | OUTPATIENT
Start: 2017-04-18 | End: 2017-04-18

## 2017-04-18 NOTE — OPERATIVE REPORT
[facility]  Operative Report  2017     Anita Martelomeron Patient Status:  Hospital Outpatient Surgery    1975 MRN [id number]   Location [address] [place] [address] Attending Sara Duran MD   [facility] Day # 0 P inch Quincke spinal needle was introduced toward the inferior aspect of the junction between the transverse process and pedicle of the right L3-L4 level atraumatically under fluoroscopic guidance.   The needle was advanced into the anterior epidural space a

## 2017-04-18 NOTE — H&P
History & Physical Examination    Patient Name: Tiffanie Blas  MRN: [id number]  CSN: [id number]  YOB: 1975    Pre-Operative Diagnosis:  Lumbar radiculopathy [M54.16]    Present Illness: A 40year old female with low back pain is he Menorrhagia    • Pneumonia, organism unspecified    • Migraines    • GENITO-URINARY DISEASE      KIDNEY STONES   • Depression    • Psychiatric disorder      ANXIETY   • Family history of congenital heart defect 8/29/2014     Father of baby with congenital benefits and alternatives with the patient/family. They understand and agree to proceed with plan of care. [ x ] I have reviewed the History and Physical done within the last 30 days. Any changes noted above.     BYRON Kumar

## 2017-04-19 ENCOUNTER — PATIENT OUTREACH (OUTPATIENT)
Dept: CASE MANAGEMENT | Age: 42
End: 2017-04-19

## 2017-04-19 DIAGNOSIS — I10 ESSENTIAL HYPERTENSION: ICD-10-CM

## 2017-04-19 DIAGNOSIS — J45.50 UNCOMPLICATED SEVERE PERSISTENT ASTHMA: ICD-10-CM

## 2017-04-19 DIAGNOSIS — E78.5 HYPERLIPIDEMIA WITH TARGET LDL LESS THAN 130: ICD-10-CM

## 2017-04-19 PROCEDURE — 99490 CHRNC CARE MGMT STAFF 1ST 20: CPT

## 2017-04-19 NOTE — PROGRESS NOTES
Spoke to pt at length about CCM, current care plan and performed CCM assessment with pt, reviewed meds and compliance. Reviewed pt dx asthma, depression, obesity, chronic back pain.    Support system:spouse and family  Diet:Pt states she has been busy with

## 2017-05-16 ENCOUNTER — TELEPHONE (OUTPATIENT)
Dept: INTERNAL MEDICINE CLINIC | Facility: CLINIC | Age: 42
End: 2017-05-16

## 2017-05-16 NOTE — TELEPHONE ENCOUNTER
Pt's feet and ankles are swollen, water pills not working and wondering what she should do?  Call back

## 2017-05-16 NOTE — TELEPHONE ENCOUNTER
Spoke with pt she has developed bilateral foot and ankle swelling x 1 week. Denies any other symptoms. Appt given for tomorrow for evaluation. Pt expressed understanding.

## 2017-05-17 ENCOUNTER — OFFICE VISIT (OUTPATIENT)
Dept: INTERNAL MEDICINE CLINIC | Facility: CLINIC | Age: 42
End: 2017-05-17

## 2017-05-17 ENCOUNTER — LAB ENCOUNTER (OUTPATIENT)
Dept: LAB | Age: 42
End: 2017-05-17
Attending: PHYSICIAN ASSISTANT
Payer: MEDICARE

## 2017-05-17 VITALS
TEMPERATURE: 98 F | HEIGHT: 64 IN | WEIGHT: 250 LBS | BODY MASS INDEX: 42.68 KG/M2 | SYSTOLIC BLOOD PRESSURE: 122 MMHG | DIASTOLIC BLOOD PRESSURE: 84 MMHG | OXYGEN SATURATION: 96 % | HEART RATE: 95 BPM | RESPIRATION RATE: 16 BRPM

## 2017-05-17 DIAGNOSIS — M79.89 SWELLING OF LOWER EXTREMITY: Primary | ICD-10-CM

## 2017-05-17 DIAGNOSIS — Z51.81 ENCOUNTER FOR MONITORING DIURETIC THERAPY: ICD-10-CM

## 2017-05-17 DIAGNOSIS — J45.50 UNCOMPLICATED SEVERE PERSISTENT ASTHMA: ICD-10-CM

## 2017-05-17 DIAGNOSIS — M79.89 SWELLING OF LOWER EXTREMITY: ICD-10-CM

## 2017-05-17 DIAGNOSIS — R23.2 HOT FLASHES: ICD-10-CM

## 2017-05-17 DIAGNOSIS — Z79.899 ENCOUNTER FOR MONITORING DIURETIC THERAPY: ICD-10-CM

## 2017-05-17 PROCEDURE — 36415 COLL VENOUS BLD VENIPUNCTURE: CPT

## 2017-05-17 PROCEDURE — 81003 URINALYSIS AUTO W/O SCOPE: CPT

## 2017-05-17 PROCEDURE — 85025 COMPLETE CBC W/AUTO DIFF WBC: CPT

## 2017-05-17 PROCEDURE — 83001 ASSAY OF GONADOTROPIN (FSH): CPT

## 2017-05-17 PROCEDURE — 80053 COMPREHEN METABOLIC PANEL: CPT

## 2017-05-17 PROCEDURE — 83735 ASSAY OF MAGNESIUM: CPT

## 2017-05-17 PROCEDURE — 84443 ASSAY THYROID STIM HORMONE: CPT

## 2017-05-17 PROCEDURE — 99214 OFFICE O/P EST MOD 30 MIN: CPT | Performed by: PHYSICIAN ASSISTANT

## 2017-05-17 NOTE — PROGRESS NOTES
HPI:    Patient ID: Will Moore is a 40year old female. Patient presents with:  Swelling: Both Feet past week. Left is worse. Swelling  This is a new problem. The current episode started in the past 7 days. The problem occurs constantly. and are negative. Current Outpatient Prescriptions:  chlorthalidone 25 MG Oral Tab Take 0.5 tablets (12.5 mg total) by mouth daily.  Disp: 15 tablet Rfl: 1   alprazolam 0.25 MG Oral Tab Take 1 tablet (0.25 mg total) by mouth every 6 (six) hours ankle: She exhibits no swelling and no ecchymosis. Achilles tendon normal.        Right lower leg: She exhibits no swelling, no edema and no deformity. Left lower leg: She exhibits no swelling, no edema and no deformity.         Right foot: There is

## 2017-05-18 ENCOUNTER — TELEPHONE (OUTPATIENT)
Dept: INTERNAL MEDICINE CLINIC | Facility: CLINIC | Age: 42
End: 2017-05-18

## 2017-05-18 DIAGNOSIS — R60.9 EDEMA, UNSPECIFIED TYPE: Primary | ICD-10-CM

## 2017-05-18 RX ORDER — FUROSEMIDE 40 MG/1
20 TABLET ORAL 2 TIMES DAILY
Qty: 5 TABLET | Refills: 0 | Status: SHIPPED | OUTPATIENT
Start: 2017-05-18 | End: 2017-05-18 | Stop reason: CLARIF

## 2017-05-18 RX ORDER — FUROSEMIDE 40 MG/1
20 TABLET ORAL DAILY
Qty: 3 TABLET | Refills: 0 | Status: SHIPPED | OUTPATIENT
Start: 2017-05-18 | End: 2017-05-23

## 2017-05-18 NOTE — TELEPHONE ENCOUNTER
Please send new script Bia Huerta had sent two scripts on Wednesday to the pharmacy.  The pharmacy is thinking this is what Bia Huerta wanted to prescribe  Furosemide  20 mg  1 a day for 5 days  Pharmacy doesn't have 40 mg  [facility]

## 2017-05-18 NOTE — TELEPHONE ENCOUNTER
----- Message from Jairo Mike PA-C sent at 5/18/2017 11:49 AM CDT -----  Please inform pt: labs wnl. Ok to send lasix 20mg, take 1/2 tab daily x 5 days for edema.

## 2017-05-18 NOTE — TELEPHONE ENCOUNTER
Spoke with patient and relayed test results. She verbalized understanding and agreed with plan. Lasix sent to pharmacy with specific instructions.

## 2017-05-23 ENCOUNTER — TELEPHONE (OUTPATIENT)
Dept: CASE MANAGEMENT | Age: 42
End: 2017-05-23

## 2017-05-23 ENCOUNTER — PATIENT OUTREACH (OUTPATIENT)
Dept: CASE MANAGEMENT | Age: 42
End: 2017-05-23

## 2017-05-23 ENCOUNTER — TELEPHONE (OUTPATIENT)
Dept: INTERNAL MEDICINE CLINIC | Facility: CLINIC | Age: 42
End: 2017-05-23

## 2017-05-23 DIAGNOSIS — E78.5 HYPERLIPIDEMIA WITH TARGET LDL LESS THAN 130: ICD-10-CM

## 2017-05-23 DIAGNOSIS — R60.0 BILATERAL LEG EDEMA: Primary | ICD-10-CM

## 2017-05-23 DIAGNOSIS — I10 ESSENTIAL HYPERTENSION: Primary | ICD-10-CM

## 2017-05-23 DIAGNOSIS — J45.50 UNCOMPLICATED SEVERE PERSISTENT ASTHMA: ICD-10-CM

## 2017-05-23 PROCEDURE — 99490 CHRNC CARE MGMT STAFF 1ST 20: CPT

## 2017-05-23 RX ORDER — FUROSEMIDE 20 MG/1
20 TABLET ORAL DAILY
Qty: 3 TABLET | Refills: 0 | Status: SHIPPED | OUTPATIENT
Start: 2017-05-23 | End: 2017-05-26

## 2017-05-23 NOTE — TELEPHONE ENCOUNTER
Spoke to pt for CCM today and update on swelling of both feet. Pt states swelling has not gone down and is on last day of furosemide 40mg - taking 0.5 tab daily for 5 days as directed. Pt states she no other symptoms or SOB and/or chest pain.   Pt wonderi

## 2017-05-23 NOTE — PROGRESS NOTES
Spoke to pt at length about CCM, current care plan and performed CCM assessment with pt, reviewed meds and compliance. Reviewed pt dx HTN, HLD, asthma, anxiety, chronic back pain.   Support system: lives with spouse  Diet: Pt states she tries to eat somethi record review, telephone communication.

## 2017-05-23 NOTE — TELEPHONE ENCOUNTER
Pt states began taking 10mg daily for the first 2 days of treatment but then increased to a full tablet of 20mg after there was no improvement. The pt has been taking 20mg already for the last 3 days.      Per Clau Knowles, Rx was sent for the pt to extend the diu

## 2017-05-23 NOTE — TELEPHONE ENCOUNTER
PATIENT CANNOT HAVE PRESCRIPTION UNTIL SHE HAS LEFT URINE FOR DRUG SCREEN, SHE DID NOT DO THE TEST ORDERED IN [facility] 2017. Please document last dose pain medication, have pt complete Narcotic agreement, and supply urine specimen prior to Rx pickup.   [place]

## 2017-05-23 NOTE — TELEPHONE ENCOUNTER
**PATEINT NEEDS NARCOTIC CONTRACT ON FILE**  UDS WAITING DONWSTAIRS    Medication: Hydrocodone-Acetaminophen  Mg    Date of last refill: 3/28/17  [place] Reviewed 03/40/81    Last office visit: 2/24/17  Due back to

## 2017-05-24 NOTE — TELEPHONE ENCOUNTER
Rx ready for . RN NEEDED    The following documentation was provided to patients at time of Rx pickup:      From the Office of Dr. Joselin Duron at [facility]:  [address] [facility] office is committed to providing the best care to our patients.    Due to t

## 2017-05-26 ENCOUNTER — OFFICE VISIT (OUTPATIENT)
Dept: INTERNAL MEDICINE CLINIC | Facility: CLINIC | Age: 42
End: 2017-05-26

## 2017-05-26 VITALS
DIASTOLIC BLOOD PRESSURE: 82 MMHG | WEIGHT: 252 LBS | HEIGHT: 64 IN | SYSTOLIC BLOOD PRESSURE: 124 MMHG | BODY MASS INDEX: 43.02 KG/M2 | HEART RATE: 95 BPM | OXYGEN SATURATION: 96 % | TEMPERATURE: 98 F

## 2017-05-26 DIAGNOSIS — J01.11 ACUTE RECURRENT FRONTAL SINUSITIS: ICD-10-CM

## 2017-05-26 DIAGNOSIS — R60.0 EDEMA, LOWER EXTREMITY: Primary | ICD-10-CM

## 2017-05-26 PROCEDURE — 99214 OFFICE O/P EST MOD 30 MIN: CPT | Performed by: PHYSICIAN ASSISTANT

## 2017-05-26 RX ORDER — AMOXICILLIN AND CLAVULANATE POTASSIUM 875; 125 MG/1; MG/1
1 TABLET, FILM COATED ORAL 2 TIMES DAILY
Qty: 20 TABLET | Refills: 0 | Status: SHIPPED | OUTPATIENT
Start: 2017-05-26 | End: 2017-06-05

## 2017-05-26 RX ORDER — FUROSEMIDE 20 MG/1
40 TABLET ORAL DAILY
Qty: 10 TABLET | Refills: 0 | Status: SHIPPED | OUTPATIENT
Start: 2017-05-26 | End: 2017-10-09 | Stop reason: ALTCHOICE

## 2017-05-26 NOTE — PROGRESS NOTES
HPI:   Leigh Morrison is a 40year old female who presents for upper respiratory symptoms for  1  weeks. Patient reports sinus pressure, headache, cough. Patient denies sob, wheezing. [id number] tried: flonase, claritin-D.     Edema of feet is asthma, unspecified    • Pain in joint, pelvic region and thigh      in hip   • Menorrhagia    • Pneumonia, organism unspecified(486)    • Migraines    • GENITO-URINARY DISEASE      KIDNEY STONES   • Depression    • Psychiatric disorder      ANXIETY   • Fa SpO2 96%  LMP 05/10/2017 (Approximate)  Breastfeeding?  No  GENERAL: well developed, well nourished,in no apparent distress  SKIN: no rashes, no suspicious lesions  EYES: PERRLA, EOMI, conjunctiva are clear  HEENT: atraumatic, normocephalic, TM's pearly gra

## 2017-05-26 NOTE — PATIENT INSTRUCTIONS
Take antibiotic as directed until completely finished. Contact us if you experience any adverse reaction to the medication. Continue lasix for 3 more days, 40mg in the morning, and wear knee-high compression stockings.

## 2017-06-01 DIAGNOSIS — J45.50 UNCOMPLICATED SEVERE PERSISTENT ASTHMA: Primary | ICD-10-CM

## 2017-06-05 ENCOUNTER — MED REC SCAN ONLY (OUTPATIENT)
Dept: SURGERY | Facility: CLINIC | Age: 42
End: 2017-06-05

## 2017-06-05 ENCOUNTER — APPOINTMENT (OUTPATIENT)
Dept: LAB | Age: 42
End: 2017-06-05
Attending: NURSE PRACTITIONER
Payer: MEDICARE

## 2017-06-05 DIAGNOSIS — F11.90 NARCOTIC DRUG USE: ICD-10-CM

## 2017-06-05 PROCEDURE — 80307 DRUG TEST PRSMV CHEM ANLYZR: CPT

## 2017-06-05 NOTE — PROGRESS NOTES
Pt here in office for Rx pickup. Reviewed pain agreement with pt. Pt verbalized understanding and signed. Copy provided. States last dose of Norco was one week ago. Pt sent to lab for urine specimen. Pt to return to pickup Rx.   Document sent to meredith

## 2017-06-05 NOTE — PROGRESS NOTES
HPI:    Patient ID: Geraldine Oliva is a 40year old female. HPI    Review of Systems         Current Outpatient Prescriptions:  estradiol 1 MG Oral Tab Take 1 tablet (1 mg total) by mouth daily.  Disp: 90 tablet Rfl: 0   Progesterone Micronize Pollen                  Hives   PHYSICAL EXAM:   Physical Exam           ASSESSMENT/PLAN:   No diagnosis found. No orders of the defined types were placed in this encounter.        Meds This Visit:  No prescriptions requested or ordered in this encount

## 2017-06-08 ENCOUNTER — TELEPHONE (OUTPATIENT)
Dept: SURGERY | Facility: CLINIC | Age: 42
End: 2017-06-08

## 2017-06-08 NOTE — TELEPHONE ENCOUNTER
Per refill encounter documentation:   Filed: 6/5/2017 10:55 AM Note Time: 6/5/2017 10:36 AM Status: Signed      : Tan Flynn RN (Registered Nurse)       Pt here in office for Rx pickup.  Reviewed pain agreement with pt.  Pt verbalized under

## 2017-06-08 NOTE — TELEPHONE ENCOUNTER
Her urine drug screen is negative for norco, unfortunately there is no documentation as to when her last dose was. Also [facility] last refill was in 11/2016 please call pharmacy and patient to verify where she is filling her medications.

## 2017-06-08 NOTE — TELEPHONE ENCOUNTER
Spoke w/ pt who states she \"always fills at [facility] in [facility]. \"  Pt inquiring about urine screen results. Informed pt of provider message below regarding urine.   Pt verbalized understanding stating she \"read on-line it stays in your system at mos

## 2017-06-27 ENCOUNTER — OFFICE VISIT (OUTPATIENT)
Dept: SURGERY | Facility: CLINIC | Age: 42
End: 2017-06-27

## 2017-06-27 VITALS
HEART RATE: 100 BPM | OXYGEN SATURATION: 98 % | BODY MASS INDEX: 42.68 KG/M2 | RESPIRATION RATE: 16 BRPM | DIASTOLIC BLOOD PRESSURE: 80 MMHG | WEIGHT: 250 LBS | HEIGHT: 64 IN | SYSTOLIC BLOOD PRESSURE: 128 MMHG

## 2017-06-27 DIAGNOSIS — M47.816 LUMBAR FACET ARTHROPATHY: ICD-10-CM

## 2017-06-27 DIAGNOSIS — M54.16 LUMBAR RADICULITIS: ICD-10-CM

## 2017-06-27 DIAGNOSIS — M46.1 SACROILIITIS, NOT ELSEWHERE CLASSIFIED (HCC): Primary | ICD-10-CM

## 2017-06-27 DIAGNOSIS — M47.819 SPONDYLOSIS WITHOUT MYELOPATHY: ICD-10-CM

## 2017-06-27 PROCEDURE — 99214 OFFICE O/P EST MOD 30 MIN: CPT | Performed by: PHYSICIAN ASSISTANT

## 2017-06-27 NOTE — PROGRESS NOTES
Pt has SCS and requesting [employer] rep be present for adjustment after procedure. Pt aware of need for SCS to be turned off prior to procedures.

## 2017-06-27 NOTE — PROGRESS NOTES
Name: Megan Levin   : 1975   DOS: 2017     Pain Clinic Follow Up Visit:   Patient presents with:   Follow - Up: got some relief from injections      Megan Levin is a 40year old female who presents for recheck of chr Activated Inhale 1 puff into the lungs daily. Disp: 1 each Rfl: 0   furosemide (LASIX) 20 MG Oral Tab Take 2 tablets (40 mg total) by mouth daily. Disp: 10 tablet Rfl: 0   chlorthalidone 25 MG Oral Tab Take 0.5 tablets (12.5 mg total) by mouth daily.  Disp: be able to continue writing narcotics for this patient. She does not need much narcotic anyway as she is not been filling the prescriptions that we have been giving to her. Her last fill was in June of this year and then in November 2016 and July 2016.

## 2017-06-27 NOTE — PATIENT INSTRUCTIONS
Refill policies:    • Allow 2-3 business days for refills; controlled substances may take longer.   • Contact your pharmacy at least 5 days prior to running out of medication and have them send an electronic request or submit request through the [facility] have a procedure or additional testing performed. [facility]) will contact your insurance carrier to obtain pre-certification or prior authorization.     Unfortunately, [facility] has seen an increase in denial of payment even though the p [facility] ([address] [place] [address]., [place], [place]) outpatient registration in the [employer]. • Please note-No prescriptions will be written by Pain Clinic in OR on the day of procedure.  If you require a refill of medications, please contact the office 48 24 hours prior. Post-procedure instructions:        Please schedule a follow up visit within 6 to 10 weeks after your last procedure date   Please call our office with any questions or concerns before or after your procedure at [phone].   If you are with antiseptic solution and then the procedure is carried out. The skin is numbed with a local anesthetic. Then X-ray or fluoroscopy is used to guide placement of the introducer needles.  Since nerves cannot actually be seen on x-ray, the introducer needle sedation, which makes the procedure easier to tolerate. It is necessary for you to be awake enough to communicate easily with the physician during the procedure.  However, some patients receive enough sedation that they have amnesia and cannot always rememb ablation? Generally speaking, this procedure is safe. However, with any procedure there are risks, side effects and the possibility of complications. The risks and complications are dependent upon the sites that are ablated.  Since the introducer needles h

## 2017-06-27 NOTE — PROGRESS NOTES
Spoke with patient and scheduled injections. Reviewed pre-op instructions. Patient verbalized understanding, no further questions at this time. Pre-op instructions sent via [employer].

## 2017-06-28 ENCOUNTER — TELEPHONE (OUTPATIENT)
Dept: SURGERY | Facility: CLINIC | Age: 42
End: 2017-06-28

## 2017-06-29 ENCOUNTER — PATIENT OUTREACH (OUTPATIENT)
Dept: CASE MANAGEMENT | Age: 42
End: 2017-06-29

## 2017-06-29 NOTE — TELEPHONE ENCOUNTER
[employer] rep Pro Hamlin informed in office of pt's request for SCS adjustment after RFA on 8/29/17. Pt procedure date and time confirmed and updated in rep calendar. Confirmed pt will be adjusted after procedure. No further questions.

## 2017-07-26 ENCOUNTER — TELEPHONE (OUTPATIENT)
Dept: INTERNAL MEDICINE CLINIC | Facility: CLINIC | Age: 42
End: 2017-07-26

## 2017-07-26 NOTE — TELEPHONE ENCOUNTER
Patient LM on VM requesting to know if we have an old report dated from 10/2014 from Dr Sabiha Gallardo? Please advise.

## 2017-07-26 NOTE — TELEPHONE ENCOUNTER
Patient called an requested a copy of the genetic testing and amniocentesis she had done in 2014. States she had copies of all her tests scanned in her chart. Notes printed and released to patient.

## 2017-07-28 DIAGNOSIS — L08.9 SKIN INFECTION: Primary | ICD-10-CM

## 2017-08-08 ENCOUNTER — PATIENT OUTREACH (OUTPATIENT)
Dept: CASE MANAGEMENT | Age: 42
End: 2017-08-08

## 2017-08-08 DIAGNOSIS — I10 ESSENTIAL HYPERTENSION: ICD-10-CM

## 2017-08-08 DIAGNOSIS — E78.5 HYPERLIPIDEMIA WITH TARGET LDL LESS THAN 130: ICD-10-CM

## 2017-08-08 DIAGNOSIS — J45.50 UNCOMPLICATED SEVERE PERSISTENT ASTHMA: ICD-10-CM

## 2017-08-08 PROCEDURE — 99490 CHRNC CARE MGMT STAFF 1ST 20: CPT

## 2017-08-08 NOTE — PROGRESS NOTES
Reviewed chart for CCM. LM for pt to call back. Spoke to pt at length about CCM, current care plan and performed CCM assessment with pt, reviewed meds and compliance. Reviewed pt dx asthma, depression, and chronic back pain.    Support system: [employer] wit

## 2017-08-21 ENCOUNTER — OFFICE VISIT (OUTPATIENT)
Dept: INTERNAL MEDICINE CLINIC | Facility: CLINIC | Age: 42
End: 2017-08-21

## 2017-08-21 ENCOUNTER — TELEPHONE (OUTPATIENT)
Dept: INTERNAL MEDICINE CLINIC | Facility: CLINIC | Age: 42
End: 2017-08-21

## 2017-08-21 VITALS
SYSTOLIC BLOOD PRESSURE: 140 MMHG | BODY MASS INDEX: 43.54 KG/M2 | HEIGHT: 64 IN | WEIGHT: 255 LBS | DIASTOLIC BLOOD PRESSURE: 90 MMHG | HEART RATE: 94 BPM | RESPIRATION RATE: 18 BRPM | OXYGEN SATURATION: 95 % | TEMPERATURE: 98 F

## 2017-08-21 DIAGNOSIS — G89.29 CHRONIC BILATERAL LOW BACK PAIN WITHOUT SCIATICA: ICD-10-CM

## 2017-08-21 DIAGNOSIS — R60.0 EDEMA, LOWER EXTREMITY: Primary | ICD-10-CM

## 2017-08-21 DIAGNOSIS — F41.1 GENERALIZED ANXIETY DISORDER: ICD-10-CM

## 2017-08-21 DIAGNOSIS — M54.50 CHRONIC BILATERAL LOW BACK PAIN WITHOUT SCIATICA: ICD-10-CM

## 2017-08-21 DIAGNOSIS — I10 ESSENTIAL HYPERTENSION: ICD-10-CM

## 2017-08-21 PROCEDURE — 99214 OFFICE O/P EST MOD 30 MIN: CPT | Performed by: PHYSICIAN ASSISTANT

## 2017-08-21 RX ORDER — FUROSEMIDE 20 MG/1
40 TABLET ORAL 2 TIMES DAILY
Qty: 20 TABLET | Refills: 1 | Status: SHIPPED | OUTPATIENT
Start: 2017-08-21 | End: 2017-10-09 | Stop reason: ALTCHOICE

## 2017-08-21 RX ORDER — ALPRAZOLAM 0.25 MG/1
0.25 TABLET ORAL EVERY 6 HOURS PRN
Qty: 30 TABLET | Refills: 1 | Status: SHIPPED
Start: 2017-08-21 | End: 2018-08-22 | Stop reason: DRUGHIGH

## 2017-08-21 NOTE — TELEPHONE ENCOUNTER
Patient called and requested a call back regarding her swollen feet; she states it has been going on for 2 weeks now, and getting worse. She is having a hard time placing shoes on, and she wants to make sure her BP is normal. Please advise.

## 2017-08-21 NOTE — PATIENT INSTRUCTIONS
Take lasix 2 tablets twice a day x 5 days, then stop  Continue to monitor blood pressure  Take xanax as needed for anxiety

## 2017-08-21 NOTE — PROGRESS NOTES
Jocelyne Almanza is a 40year old female. HPI:   Patient following up on edema. C/o gradually worsening swelling in legs, tender, painful walking. Does not improve with lasix 40mg daily.  Denies SOB, chest pain, redness of skin    C/o continued an Tab Take 2 tablets (40 mg total) by mouth daily. Disp: 10 tablet Rfl: 0   Ibuprofen 200 MG Oral Cap Take by mouth as needed.  Disp:  Rfl:       Past Medical History:   Diagnosis Date   • Asthma    • Depression    • Extrinsic asthma, unspecified    • Family continue prn norco 10/325mg q6h prn for pain. Continue with pain clinic for radiofrequency ablations as planned in order to provide longer-term pain control in an effort to improve function and avoid need for narcotic pain meds. HTN=above goal today.  St

## 2017-08-22 RX ORDER — HYDROCODONE BITARTRATE AND ACETAMINOPHEN 10; 325 MG/1; MG/1
1 TABLET ORAL EVERY 6 HOURS PRN
Qty: 90 TABLET | Refills: 0 | Status: SHIPPED | OUTPATIENT
Start: 2017-08-22 | End: 2018-01-27

## 2017-08-28 ENCOUNTER — TELEPHONE (OUTPATIENT)
Dept: SURGERY | Facility: CLINIC | Age: 42
End: 2017-08-28

## 2017-08-28 NOTE — TELEPHONE ENCOUNTER
Spoke w/ pt and reviewed instructions, procedure date and arrival time. Pt verbalized understanding and appreciation. No further needs. Pt also requesting for office to leave VM for \"next four weeks\" regarding pre-procedure outreach.     Confirmed pt

## 2017-08-29 ENCOUNTER — APPOINTMENT (OUTPATIENT)
Dept: GENERAL RADIOLOGY | Facility: HOSPITAL | Age: 42
End: 2017-08-29
Attending: ANESTHESIOLOGY
Payer: MEDICARE

## 2017-08-29 ENCOUNTER — HOSPITAL ENCOUNTER (OUTPATIENT)
Facility: HOSPITAL | Age: 42
Setting detail: HOSPITAL OUTPATIENT SURGERY
Discharge: HOME | End: 2017-08-29
Attending: ANESTHESIOLOGY | Admitting: ANESTHESIOLOGY
Payer: MEDICARE

## 2017-08-29 ENCOUNTER — SURGERY (OUTPATIENT)
Age: 42
End: 2017-08-29

## 2017-08-29 VITALS
RESPIRATION RATE: 16 BRPM | TEMPERATURE: 98 F | HEART RATE: 100 BPM | BODY MASS INDEX: 44 KG/M2 | WEIGHT: 255 LBS | OXYGEN SATURATION: 98 % | DIASTOLIC BLOOD PRESSURE: 96 MMHG | SYSTOLIC BLOOD PRESSURE: 139 MMHG

## 2017-08-29 DIAGNOSIS — M47.819 SPONDYLOSIS WITHOUT MYELOPATHY: ICD-10-CM

## 2017-08-29 DIAGNOSIS — M46.1 SACROILIITIS, NOT ELSEWHERE CLASSIFIED (HCC): ICD-10-CM

## 2017-08-29 LAB
POCT LOT NUMBER: NORMAL
POCT URINE PREGNANCY: NEGATIVE

## 2017-08-29 PROCEDURE — 76000 FLUOROSCOPY <1 HR PHYS/QHP: CPT | Performed by: ANESTHESIOLOGY

## 2017-08-29 PROCEDURE — 77002 NEEDLE LOCALIZATION BY XRAY: CPT | Performed by: ANESTHESIOLOGY

## 2017-08-29 PROCEDURE — 3E0T3TZ INTRODUCTION OF DESTRUCTIVE AGENT INTO PERIPHERAL NERVES AND PLEXI, PERCUTANEOUS APPROACH: ICD-10-PCS | Performed by: ANESTHESIOLOGY

## 2017-08-29 PROCEDURE — 81025 URINE PREGNANCY TEST: CPT | Performed by: ANESTHESIOLOGY

## 2017-08-29 PROCEDURE — 99152 MOD SED SAME PHYS/QHP 5/>YRS: CPT | Performed by: ANESTHESIOLOGY

## 2017-08-29 RX ORDER — MIDAZOLAM HYDROCHLORIDE 1 MG/ML
INJECTION INTRAMUSCULAR; INTRAVENOUS AS NEEDED
Status: DISCONTINUED | OUTPATIENT
Start: 2017-08-29 | End: 2017-08-29 | Stop reason: HOSPADM

## 2017-08-29 RX ORDER — METHYLPREDNISOLONE ACETATE 40 MG/ML
INJECTION, SUSPENSION INTRA-ARTICULAR; INTRALESIONAL; INTRAMUSCULAR; SOFT TISSUE AS NEEDED
Status: DISCONTINUED | OUTPATIENT
Start: 2017-08-29 | End: 2017-08-29 | Stop reason: HOSPADM

## 2017-08-29 RX ORDER — DIPHENHYDRAMINE HCL 25 MG
25 CAPSULE ORAL ONCE
Status: DISCONTINUED | OUTPATIENT
Start: 2017-08-29 | End: 2017-08-29

## 2017-08-29 RX ORDER — LIDOCAINE HYDROCHLORIDE 10 MG/ML
INJECTION, SOLUTION EPIDURAL; INFILTRATION; INTRACAUDAL; PERINEURAL AS NEEDED
Status: DISCONTINUED | OUTPATIENT
Start: 2017-08-29 | End: 2017-08-29 | Stop reason: HOSPADM

## 2017-08-29 RX ORDER — SODIUM CHLORIDE, SODIUM LACTATE, POTASSIUM CHLORIDE, CALCIUM CHLORIDE 600; 310; 30; 20 MG/100ML; MG/100ML; MG/100ML; MG/100ML
100 INJECTION, SOLUTION INTRAVENOUS CONTINUOUS
Status: DISCONTINUED | OUTPATIENT
Start: 2017-08-29 | End: 2017-08-29

## 2017-08-29 NOTE — OR NURSING
Kalyani NP made aware patient c/o itching to bilateral lower legs and arms. Denies any SOB or difficulty breathing. Per Luzmaria Owen NP, patient had hives on her feet after her last procedure. Orders received for Benadryl 25mg po.  Patient made aware, wants to take

## 2017-08-29 NOTE — H&P
History & Physical Examination    Patient Name: Edgar Winston  MRN: [id number]  [facility]: [id number]  YOB: 1975    Pre-Operative Diagnosis:  Sacroiliitis, not elsewhere classified ([facility] [M46.1]  Spondylosis without myelopathy [M19.90] Losartan Potassium (COZAAR) 100 MG Oral Tab Take 1 tablet (100 mg total) by mouth daily.  Disp: 30 tablet Rfl: 2 Taking   Montelukast Sodium (SINGULAIR) 10 MG Oral Tab TAKE 1 TABLET BY MOUTH NIGHTLY (Patient taking differently: TAKE 1 TABLET BY MOUTH apryl • Prostate Cancer Maternal Uncle         Smoking status: Never Smoker    Smokeless tobacco: Never Used    Alcohol use Yes  0.0 oz/week     Comment: rare     /100   Pulse 105   Temp 98.2 °F (36.8 °C)   Resp 18   Wt 255 lb   LMP 08/14/2017 (Exact Ganesh

## 2017-08-29 NOTE — OPERATIVE REPORT
[facility]  Operative Report  2017     Gina Resendiz Patient Status:  Hospital Outpatient Surgery    1975 MRN [id number]   Location [facility] Attending No att. providers found   [address] Day # patient's lumbar and sacral areas were prepped and draped in sterile fashion. Subcutaneous lidocaine was instilled into the superficial soft tissues for local anesthesia.  Under fluoroscopic guidance, a 22-gauge, 100-mm SMK needle was advanced into the infe

## 2017-08-29 NOTE — OR NURSING
Discharge instructions discussed with patient, verbalized understanding. Patient able to ambulate in room without difficulty. Denies any numbness or tingling to extremities.

## 2017-09-01 ENCOUNTER — PATIENT OUTREACH (OUTPATIENT)
Dept: CASE MANAGEMENT | Age: 42
End: 2017-09-01

## 2017-09-01 NOTE — PROGRESS NOTES
Reviewed chart for CCM today. Pt has f/up appt with PCP office on 09/06. Will f/up with pt after visit this month.   Time spent: collecting and reviewing pt data 4 minutes

## 2017-09-05 ENCOUNTER — HOSPITAL ENCOUNTER (OUTPATIENT)
Facility: HOSPITAL | Age: 42
Setting detail: HOSPITAL OUTPATIENT SURGERY
Discharge: HOME | End: 2017-09-05
Attending: ANESTHESIOLOGY | Admitting: ANESTHESIOLOGY
Payer: MEDICARE

## 2017-09-05 ENCOUNTER — TELEPHONE (OUTPATIENT)
Dept: SURGERY | Facility: CLINIC | Age: 42
End: 2017-09-05

## 2017-09-05 ENCOUNTER — SURGERY (OUTPATIENT)
Age: 42
End: 2017-09-05

## 2017-09-05 ENCOUNTER — APPOINTMENT (OUTPATIENT)
Dept: GENERAL RADIOLOGY | Facility: HOSPITAL | Age: 42
End: 2017-09-05
Attending: ANESTHESIOLOGY
Payer: MEDICARE

## 2017-09-05 VITALS
OXYGEN SATURATION: 96 % | TEMPERATURE: 98 F | HEART RATE: 89 BPM | DIASTOLIC BLOOD PRESSURE: 84 MMHG | SYSTOLIC BLOOD PRESSURE: 126 MMHG | RESPIRATION RATE: 20 BRPM

## 2017-09-05 DIAGNOSIS — M47.819 SPONDYLOSIS WITHOUT MYELOPATHY: ICD-10-CM

## 2017-09-05 DIAGNOSIS — M47.816 LUMBAR FACET ARTHROPATHY: ICD-10-CM

## 2017-09-05 PROCEDURE — 76000 FLUOROSCOPY <1 HR PHYS/QHP: CPT | Performed by: ANESTHESIOLOGY

## 2017-09-05 PROCEDURE — 99153 MOD SED SAME PHYS/QHP EA: CPT

## 2017-09-05 PROCEDURE — 81025 URINE PREGNANCY TEST: CPT | Performed by: ANESTHESIOLOGY

## 2017-09-05 PROCEDURE — 99152 MOD SED SAME PHYS/QHP 5/>YRS: CPT

## 2017-09-05 PROCEDURE — 3E0T3TZ INTRODUCTION OF DESTRUCTIVE AGENT INTO PERIPHERAL NERVES AND PLEXI, PERCUTANEOUS APPROACH: ICD-10-PCS | Performed by: ANESTHESIOLOGY

## 2017-09-05 RX ORDER — MIDAZOLAM HYDROCHLORIDE 1 MG/ML
INJECTION INTRAMUSCULAR; INTRAVENOUS AS NEEDED
Status: DISCONTINUED | OUTPATIENT
Start: 2017-09-05 | End: 2017-09-05 | Stop reason: HOSPADM

## 2017-09-05 RX ORDER — SODIUM CHLORIDE, SODIUM LACTATE, POTASSIUM CHLORIDE, CALCIUM CHLORIDE 600; 310; 30; 20 MG/100ML; MG/100ML; MG/100ML; MG/100ML
100 INJECTION, SOLUTION INTRAVENOUS CONTINUOUS
Status: DISCONTINUED | OUTPATIENT
Start: 2017-09-05 | End: 2017-09-05

## 2017-09-05 RX ORDER — LIDOCAINE HYDROCHLORIDE 10 MG/ML
INJECTION, SOLUTION EPIDURAL; INFILTRATION; INTRACAUDAL; PERINEURAL AS NEEDED
Status: DISCONTINUED | OUTPATIENT
Start: 2017-09-05 | End: 2017-09-05 | Stop reason: HOSPADM

## 2017-09-05 NOTE — OPERATIVE REPORT
[facility]  Operative Report  2017     Fabiano Burkett Patient Status:  Hospital Outpatient Surgery    1975 MRN [id number]   Location [address] [place] [address] Attending No att. providers found   Hosp Day # 0 local anesthesia. Under fluoroscopic guidance, a 22-gauge, 100-mm SMK needle was advanced to the junction of the superior aspect of the right L3 transverse process and the lateral aspect of the same level superior articular process at right L2-L3 level .

## 2017-09-05 NOTE — H&P
History & Physical Examination    Patient Name: Bret Yoo  MRN: [id number]  CSN: [id number]  YOB: 1975    Pre-Operative Diagnosis:  Spondylosis without myelopathy [M19.90]  Lumbar facet arthropathy [M12.88]    Present Illness: (COZAAR) 100 MG Oral Tab Take 1 tablet (100 mg total) by mouth daily.  Disp: 30 tablet Rfl: 2 Taking   Montelukast Sodium (SINGULAIR) 10 MG Oral Tab TAKE 1 TABLET BY MOUTH NIGHTLY (Patient taking differently: TAKE 1 TABLET BY MOUTH morning) Disp: 90 tablet Maternal Uncle         Smoking status: Never Smoker    Smokeless tobacco: Never Used    Alcohol use Yes  0.0 oz/week     Comment: rare     LMP 08/14/2017 (Exact Date)   SYSTEM Check if Review is Normal Check if Physical Exam is Normal If not normal, please

## 2017-09-06 ENCOUNTER — OFFICE VISIT (OUTPATIENT)
Dept: INTERNAL MEDICINE CLINIC | Facility: CLINIC | Age: 42
End: 2017-09-06

## 2017-09-06 ENCOUNTER — TELEPHONE (OUTPATIENT)
Dept: SURGERY | Facility: CLINIC | Age: 42
End: 2017-09-06

## 2017-09-06 VITALS
HEIGHT: 64 IN | TEMPERATURE: 98 F | RESPIRATION RATE: 18 BRPM | BODY MASS INDEX: 43.54 KG/M2 | OXYGEN SATURATION: 98 % | SYSTOLIC BLOOD PRESSURE: 138 MMHG | HEART RATE: 96 BPM | DIASTOLIC BLOOD PRESSURE: 88 MMHG | WEIGHT: 255 LBS

## 2017-09-06 DIAGNOSIS — J45.50 UNCOMPLICATED SEVERE PERSISTENT ASTHMA: ICD-10-CM

## 2017-09-06 DIAGNOSIS — J30.9 ALLERGIC SINUSITIS: ICD-10-CM

## 2017-09-06 DIAGNOSIS — Z23 NEED FOR VACCINATION: ICD-10-CM

## 2017-09-06 DIAGNOSIS — I10 ESSENTIAL HYPERTENSION: Primary | ICD-10-CM

## 2017-09-06 DIAGNOSIS — Z12.31 ENCOUNTER FOR SCREENING MAMMOGRAM FOR MALIGNANT NEOPLASM OF BREAST: ICD-10-CM

## 2017-09-06 PROCEDURE — 99214 OFFICE O/P EST MOD 30 MIN: CPT | Performed by: PHYSICIAN ASSISTANT

## 2017-09-06 PROCEDURE — G0008 ADMIN INFLUENZA VIRUS VAC: HCPCS | Performed by: PHYSICIAN ASSISTANT

## 2017-09-06 PROCEDURE — 90686 IIV4 VACC NO PRSV 0.5 ML IM: CPT | Performed by: PHYSICIAN ASSISTANT

## 2017-09-06 NOTE — TELEPHONE ENCOUNTER
Pt called with questions after seeing her Primary Care Giver r/t taking antibiotics preventatively for seasonal allergies/sinus infection. Pt stated she could wait until after her upcoming injections next week and the following week.  Pt verbalized Shelia

## 2017-09-06 NOTE — PROGRESS NOTES
Danita Pittman is a 40year old female. HPI:   Pt here for BP f/u. At home 130's/80's, denies side effects on medication  Edema : Finished lasix last week, edema significantly improved.    She c/o some congestion with changes in weather; taking Rfl:    Losartan Potassium (COZAAR) 100 MG Oral Tab Take 1 tablet (100 mg total) by mouth daily.  Disp: 30 tablet Rfl: 2   Montelukast Sodium (SINGULAIR) 10 MG Oral Tab TAKE 1 TABLET BY MOUTH NIGHTLY (Patient taking differently: TAKE 1 TABLET BY MOUTH niyah auscultation b/l no W/R/R  CARDIO: RRR without murmur , rub or gallop  GI: good BS's,no masses, HSM, distension or tenderness  EXTREMITIES: no cyanosis, clubbing or edema  MUSCULOSKELETAL: FROM, no joint swelling or bony tenderness    ASSESSMENT AND PLAN:

## 2017-09-06 NOTE — PATIENT INSTRUCTIONS
Continue to monitor blood pressure 1-2 times daily  Continue current allergy care. If any worsening in congestion, sinus pressure, or onset of asthma symptoms, we will need to start an antibiotic right away.  Please call if this happens  Complete mammogram

## 2017-09-07 ENCOUNTER — PATIENT OUTREACH (OUTPATIENT)
Dept: CASE MANAGEMENT | Age: 42
End: 2017-09-07

## 2017-09-07 NOTE — PROGRESS NOTES
Spoke to pt for CCM today. Pt states her appt with Clau Knowles at PCP office went fine yesterday.   Pt states she has a possible sinus infection however does not want to start on an antibiotic at this time as she can not take while receiving pain injections with

## 2017-09-08 ENCOUNTER — TELEPHONE (OUTPATIENT)
Dept: SURGERY | Facility: CLINIC | Age: 42
End: 2017-09-08

## 2017-09-08 NOTE — TELEPHONE ENCOUNTER
Called patient to review the following preoperative instructions. Reminded patient to hold ibuprofen. Verbalized understanding, all questions answered.        [address]  PRE-PROCEDURE INSTRUCTIONS WITH IV SEDATION    Appointment Date: 9/12 ? Xarelto (Rivaroxaban) 3 days  ? Lovenox (Enoxaparin) 24 hours  ? Aspirin  ? 81mg 24 hours  ? Greater than 81 mg (325mg) 7 days  ? Coumadin Procedure may be cancelled if INR is elevated. ? Epidural ____ - 7 days  ? Others 5 days  ?  Excedrin (with aspiri It is normal to have increased pain at injection site for up to 3-5 days after procedure, you can use heat for the first 24 hours (20 minutes on 20 minutes off) after the first 24 hours you can use heat or cold.

## 2017-09-08 NOTE — TELEPHONE ENCOUNTER
See 9/8 TRENTON Archibald called placed to patient for post procedure follow up as well as pre op instructions given for procedure on 9/12. Patient stated \"I'm doing great, no problems at all. \"  Informed patient that soreness is to be expected after the proc

## 2017-09-12 ENCOUNTER — HOSPITAL ENCOUNTER (OUTPATIENT)
Facility: HOSPITAL | Age: 42
Setting detail: HOSPITAL OUTPATIENT SURGERY
Discharge: HOME | End: 2017-09-12
Attending: ANESTHESIOLOGY | Admitting: ANESTHESIOLOGY
Payer: MEDICARE

## 2017-09-12 ENCOUNTER — SURGERY (OUTPATIENT)
Age: 42
End: 2017-09-12

## 2017-09-12 ENCOUNTER — APPOINTMENT (OUTPATIENT)
Dept: GENERAL RADIOLOGY | Facility: HOSPITAL | Age: 42
End: 2017-09-12
Attending: ANESTHESIOLOGY
Payer: MEDICARE

## 2017-09-12 VITALS
RESPIRATION RATE: 18 BRPM | TEMPERATURE: 98 F | OXYGEN SATURATION: 98 % | SYSTOLIC BLOOD PRESSURE: 172 MMHG | HEART RATE: 93 BPM | DIASTOLIC BLOOD PRESSURE: 107 MMHG

## 2017-09-12 DIAGNOSIS — M47.819 SPONDYLOSIS WITHOUT MYELOPATHY: ICD-10-CM

## 2017-09-12 DIAGNOSIS — M46.1 SACROILIITIS, NOT ELSEWHERE CLASSIFIED (HCC): ICD-10-CM

## 2017-09-12 LAB
POCT LOT NUMBER: NORMAL
POCT URINE PREGNANCY: NEGATIVE

## 2017-09-12 PROCEDURE — 76000 FLUOROSCOPY <1 HR PHYS/QHP: CPT | Performed by: ANESTHESIOLOGY

## 2017-09-12 PROCEDURE — 3E0T3TZ INTRODUCTION OF DESTRUCTIVE AGENT INTO PERIPHERAL NERVES AND PLEXI, PERCUTANEOUS APPROACH: ICD-10-PCS | Performed by: ANESTHESIOLOGY

## 2017-09-12 PROCEDURE — 81025 URINE PREGNANCY TEST: CPT | Performed by: ANESTHESIOLOGY

## 2017-09-12 PROCEDURE — 99152 MOD SED SAME PHYS/QHP 5/>YRS: CPT | Performed by: ANESTHESIOLOGY

## 2017-09-12 RX ORDER — SODIUM CHLORIDE, SODIUM LACTATE, POTASSIUM CHLORIDE, CALCIUM CHLORIDE 600; 310; 30; 20 MG/100ML; MG/100ML; MG/100ML; MG/100ML
100 INJECTION, SOLUTION INTRAVENOUS CONTINUOUS
Status: DISCONTINUED | OUTPATIENT
Start: 2017-09-12 | End: 2017-09-12

## 2017-09-12 RX ORDER — LIDOCAINE HYDROCHLORIDE 10 MG/ML
INJECTION, SOLUTION EPIDURAL; INFILTRATION; INTRACAUDAL; PERINEURAL AS NEEDED
Status: DISCONTINUED | OUTPATIENT
Start: 2017-09-12 | End: 2017-09-12 | Stop reason: HOSPADM

## 2017-09-12 RX ORDER — MIDAZOLAM HYDROCHLORIDE 1 MG/ML
INJECTION INTRAMUSCULAR; INTRAVENOUS AS NEEDED
Status: DISCONTINUED | OUTPATIENT
Start: 2017-09-12 | End: 2017-09-12 | Stop reason: HOSPADM

## 2017-09-12 NOTE — H&P
History & Physical Examination    Patient Name: Edgar Winston  MRN: [id number]  [facility]: [id number]  YOB: 1975    Pre-Operative Diagnosis:  Sacroili[facility], not elsewhere classified ([facility] [M46.1]  Spondylosis without myelopathy [M19.90] Losartan Potassium (COZAAR) 100 MG Oral Tab Take 1 tablet (100 mg total) by mouth daily.  Disp: 30 tablet Rfl: 2 Taking   Montelukast Sodium (SINGULAIR) 10 MG Oral Tab TAKE 1 TABLET BY MOUTH NIGHTLY (Patient taking differently: TAKE 1 TABLET BY MOUTH apryl had brain cancer   • Prostate Cancer Maternal Grandfather    • Prostate Cancer Maternal Uncle         Smoking status: Never Smoker    Smokeless tobacco: Never Used    Alcohol use Yes  0.0 oz/week     Comment: rare     LMP 08/14/2017 (Exact Date)   SYSTEM C

## 2017-09-12 NOTE — OPERATIVE REPORT
[facility]  Operative Report  2017     Dori Medrano Patient Status:  Hospital Outpatient Surgery    1975 MRN [id number]   Location [facility] Attending No att. providers found   [facility] Day # in sterile fashion. Subcutaneous lidocaine was instilled into the superficial soft tissues for local anesthesia. Under fluoroscopic guidance, a 22-gauge, 100-mm SMK needle was advanced into the inferior pole of the left sacroiliac joint.  The second needle

## 2017-09-13 NOTE — TELEPHONE ENCOUNTER
Courtesy called placed to patient for post procedure follow up. Patient stated still a bit sore. Informed patient that soreness is to be expected after the procedure.  Educated patient that it takes 3-5 days for the steroid to be effective and to allow adeq

## 2017-09-18 ENCOUNTER — TELEPHONE (OUTPATIENT)
Dept: NEUROLOGY | Facility: CLINIC | Age: 42
End: 2017-09-18

## 2017-09-18 NOTE — TELEPHONE ENCOUNTER
Spoke w/ pt and reviewed instructions, procedure date and arrival time. Pt verbalized understanding and appreciation. States this was first call received pre-procedure. Informed pt call was received on 8/28/17 prior to RFA and discussed SCS.   Pt states

## 2017-09-19 ENCOUNTER — SURGERY (OUTPATIENT)
Age: 42
End: 2017-09-19

## 2017-09-19 ENCOUNTER — HOSPITAL ENCOUNTER (OUTPATIENT)
Facility: HOSPITAL | Age: 42
Setting detail: HOSPITAL OUTPATIENT SURGERY
Discharge: HOME | End: 2017-09-19
Attending: ANESTHESIOLOGY | Admitting: ANESTHESIOLOGY
Payer: MEDICARE

## 2017-09-19 ENCOUNTER — APPOINTMENT (OUTPATIENT)
Dept: GENERAL RADIOLOGY | Facility: HOSPITAL | Age: 42
End: 2017-09-19
Attending: ANESTHESIOLOGY
Payer: MEDICARE

## 2017-09-19 VITALS
SYSTOLIC BLOOD PRESSURE: 139 MMHG | DIASTOLIC BLOOD PRESSURE: 91 MMHG | RESPIRATION RATE: 18 BRPM | OXYGEN SATURATION: 95 % | HEART RATE: 80 BPM | TEMPERATURE: 98 F

## 2017-09-19 DIAGNOSIS — M47.819 SPONDYLOSIS WITHOUT MYELOPATHY: ICD-10-CM

## 2017-09-19 DIAGNOSIS — M47.816 LUMBAR FACET ARTHROPATHY: ICD-10-CM

## 2017-09-19 LAB
POCT LOT NUMBER: NORMAL
POCT URINE PREGNANCY: NEGATIVE

## 2017-09-19 PROCEDURE — 3E0T3TZ INTRODUCTION OF DESTRUCTIVE AGENT INTO PERIPHERAL NERVES AND PLEXI, PERCUTANEOUS APPROACH: ICD-10-PCS | Performed by: ANESTHESIOLOGY

## 2017-09-19 PROCEDURE — 76000 FLUOROSCOPY <1 HR PHYS/QHP: CPT | Performed by: ANESTHESIOLOGY

## 2017-09-19 PROCEDURE — 99152 MOD SED SAME PHYS/QHP 5/>YRS: CPT | Performed by: ANESTHESIOLOGY

## 2017-09-19 PROCEDURE — 81025 URINE PREGNANCY TEST: CPT | Performed by: ANESTHESIOLOGY

## 2017-09-19 RX ORDER — METHYLPREDNISOLONE ACETATE 40 MG/ML
INJECTION, SUSPENSION INTRA-ARTICULAR; INTRALESIONAL; INTRAMUSCULAR; SOFT TISSUE AS NEEDED
Status: DISCONTINUED | OUTPATIENT
Start: 2017-09-19 | End: 2017-09-19 | Stop reason: HOSPADM

## 2017-09-19 RX ORDER — MIDAZOLAM HYDROCHLORIDE 1 MG/ML
INJECTION INTRAMUSCULAR; INTRAVENOUS AS NEEDED
Status: DISCONTINUED | OUTPATIENT
Start: 2017-09-19 | End: 2017-09-19 | Stop reason: HOSPADM

## 2017-09-19 RX ORDER — LIDOCAINE HYDROCHLORIDE 10 MG/ML
INJECTION, SOLUTION EPIDURAL; INFILTRATION; INTRACAUDAL; PERINEURAL AS NEEDED
Status: DISCONTINUED | OUTPATIENT
Start: 2017-09-19 | End: 2017-09-19 | Stop reason: HOSPADM

## 2017-09-19 RX ORDER — SODIUM CHLORIDE, SODIUM LACTATE, POTASSIUM CHLORIDE, CALCIUM CHLORIDE 600; 310; 30; 20 MG/100ML; MG/100ML; MG/100ML; MG/100ML
100 INJECTION, SOLUTION INTRAVENOUS CONTINUOUS
Status: DISCONTINUED | OUTPATIENT
Start: 2017-09-19 | End: 2017-09-19

## 2017-09-19 NOTE — H&P
History & Physical Examination    Patient Name: Fadia Nelson  MRN: [id number]  [facility]: [id number]  YOB: 1975    Pre-Operative Diagnosis:  Spondylosis without myelopathy [M19.90]  Lumbar facet arthropathy [M12.88]    Present Illness: daily. Disp: 30 tablet Rfl: 2 9/18/2017   Montelukast Sodium (SINGULAIR) 10 MG Oral Tab TAKE 1 TABLET BY MOUTH NIGHTLY (Patient taking differently: TAKE 1 TABLET BY MOUTH morning) Disp: 90 tablet Rfl: 1 9/18/2017   mupirocin 2 % External Ointment Apply to Diabetes Father 36   • Obesity Father    • Cancer Maternal Grandfather      brain; 6/9 of his siblings had brain cancer   • Prostate Cancer Maternal Grandfather    • Prostate Cancer Maternal Uncle         Smoking status: Never Smoker    Smokeless tobacco:

## 2017-09-19 NOTE — OPERATIVE REPORT
[facility]  Operative Report  2017     Vianne Miss Medrano Patient Status:  Hospital Outpatient Surgery    1975 MRN [id number]   [facility] Attending Ana Soto MD   Hosp Day # 0 PCP Dandy Cisneros MD 1% lidocaine for local anesthesia.   Under fluoroscopic guidance, a 22-gauge, 100-mm SMK needle was advanced to the junction of the superior aspect of the left L3 transverse process and the lateral aspect of the same level superior articular process at left

## 2017-09-20 ENCOUNTER — TELEPHONE (OUTPATIENT)
Dept: INTERNAL MEDICINE CLINIC | Facility: CLINIC | Age: 42
End: 2017-09-20

## 2017-09-20 ENCOUNTER — PATIENT OUTREACH (OUTPATIENT)
Dept: CASE MANAGEMENT | Age: 42
End: 2017-09-20

## 2017-09-20 DIAGNOSIS — J45.50 UNCOMPLICATED SEVERE PERSISTENT ASTHMA: ICD-10-CM

## 2017-09-20 DIAGNOSIS — E78.5 HYPERLIPIDEMIA WITH TARGET LDL LESS THAN 130: ICD-10-CM

## 2017-09-20 DIAGNOSIS — I10 ESSENTIAL HYPERTENSION: ICD-10-CM

## 2017-09-20 PROCEDURE — 99490 CHRNC CARE MGMT STAFF 1ST 20: CPT

## 2017-09-20 RX ORDER — PREDNISONE 20 MG/1
TABLET ORAL
Qty: 18 TABLET | Refills: 0 | Status: SHIPPED | OUTPATIENT
Start: 2017-09-20 | End: 2017-10-12

## 2017-09-20 RX ORDER — AMOXICILLIN AND CLAVULANATE POTASSIUM 875; 125 MG/1; MG/1
1 TABLET, FILM COATED ORAL 2 TIMES DAILY
Qty: 20 TABLET | Refills: 0 | Status: SHIPPED | OUTPATIENT
Start: 2017-09-20 | End: 2017-09-30

## 2017-09-20 NOTE — TELEPHONE ENCOUNTER
Per Leslie Zhu Augmentin 875mg BID x 10 days, prednisone 60mg taper. Rx sent to retail. D/w pt she expressed understanding.

## 2017-09-20 NOTE — TELEPHONE ENCOUNTER
Courtesy called placed to patient for post procedure follow up. Patient stated \"I'm very sore and black and blue. \"  Informed patient that soreness is to be expected after the procedure.  Educated patient that it takes 3-5 days for the steroid to be effect

## 2017-09-20 NOTE — TELEPHONE ENCOUNTER
Spoke to pt for CCM today. Pt states she continues to have sinus and upper respiratory symptoms since her office visit with Adina Campos on 09/06.   Pt states she had her last pain injection today and was told by Adina Campos at [facility] if symptoms persist to let her know

## 2017-09-20 NOTE — PROGRESS NOTES
Spoke to pt at length about CCM, current care plan and performed CCM assessment with pt, reviewed meds and compliance. Reviewed pt dx HTN, HLD, mild asthma, anxiety, obesity and chronic back pain. Support system: Lives with spouse. Family. Exercise: Activ

## 2017-09-29 ENCOUNTER — HOSPITAL ENCOUNTER (OUTPATIENT)
Dept: CV DIAGNOSTICS | Facility: HOSPITAL | Age: 42
Discharge: HOME | End: 2017-09-29
Attending: PHYSICIAN ASSISTANT
Payer: MEDICARE

## 2017-09-29 DIAGNOSIS — R60.0 EDEMA, LOWER EXTREMITY: ICD-10-CM

## 2017-09-29 PROCEDURE — 93306 TTE W/DOPPLER COMPLETE: CPT | Performed by: PHYSICIAN ASSISTANT

## 2017-10-07 ENCOUNTER — HOSPITAL ENCOUNTER (OUTPATIENT)
Dept: MAMMOGRAPHY | Facility: HOSPITAL | Age: 42
Discharge: HOME | End: 2017-10-07
Attending: OBSTETRICS & GYNECOLOGY
Payer: MEDICARE

## 2017-10-07 DIAGNOSIS — Z12.31 ENCOUNTER FOR SCREENING MAMMOGRAM FOR MALIGNANT NEOPLASM OF BREAST: ICD-10-CM

## 2017-10-07 PROCEDURE — 77063 BREAST TOMOSYNTHESIS BI: CPT | Performed by: OBSTETRICS & GYNECOLOGY

## 2017-10-07 PROCEDURE — 77067 SCR MAMMO BI INCL CAD: CPT | Performed by: OBSTETRICS & GYNECOLOGY

## 2017-10-09 ENCOUNTER — TELEPHONE (OUTPATIENT)
Dept: INTERNAL MEDICINE CLINIC | Facility: CLINIC | Age: 42
End: 2017-10-09

## 2017-10-09 DIAGNOSIS — J45.50 UNCOMPLICATED SEVERE PERSISTENT ASTHMA: ICD-10-CM

## 2017-10-09 DIAGNOSIS — R60.0 LOWER LEG EDEMA: Primary | ICD-10-CM

## 2017-10-09 RX ORDER — ALBUTEROL SULFATE 90 UG/1
AEROSOL, METERED RESPIRATORY (INHALATION)
Qty: 18 G | Refills: 0 | OUTPATIENT
Start: 2017-10-09

## 2017-10-09 RX ORDER — FUROSEMIDE 20 MG/1
20 TABLET ORAL DAILY
Qty: 3 TABLET | Refills: 0 | Status: SHIPPED | OUTPATIENT
Start: 2017-10-09 | End: 2017-10-15

## 2017-10-09 NOTE — TELEPHONE ENCOUNTER
Pt was seen on 8/21/2017 c/o lower leg edema. An echo was preformed on 9/29/2017 that was wnl. Pt described feet as balloons and feels as though there are no ankles. The pt states it is painful to walk and put pressure on the feet.  This has been worsen

## 2017-10-09 NOTE — TELEPHONE ENCOUNTER
Pt says her feet have been swelling since Friday and wants to know if water pills can be called in to the [facility] on file?  Please call her back

## 2017-10-11 ENCOUNTER — TELEPHONE (OUTPATIENT)
Dept: INTERNAL MEDICINE CLINIC | Facility: CLINIC | Age: 42
End: 2017-10-11

## 2017-10-11 NOTE — TELEPHONE ENCOUNTER
Please call patient says the swelling in her feet has not gone down. Does have an appt with her new Dr. Next week is not sure if she should wait that long about the swelling. Has been on meds for 3 days.

## 2017-10-11 NOTE — TELEPHONE ENCOUNTER
Spoke with pt swelling has not resolved or improved. Appt scheduled for tomorrow for evaluation. Pt expressed understanding.

## 2017-10-12 ENCOUNTER — OFFICE VISIT (OUTPATIENT)
Dept: INTERNAL MEDICINE CLINIC | Facility: CLINIC | Age: 42
End: 2017-10-12

## 2017-10-12 VITALS
OXYGEN SATURATION: 98 % | HEIGHT: 64 IN | RESPIRATION RATE: 16 BRPM | DIASTOLIC BLOOD PRESSURE: 88 MMHG | TEMPERATURE: 98 F | SYSTOLIC BLOOD PRESSURE: 116 MMHG | WEIGHT: 255 LBS | HEART RATE: 87 BPM | BODY MASS INDEX: 43.54 KG/M2

## 2017-10-12 DIAGNOSIS — R60.0 BILATERAL LOWER EXTREMITY EDEMA: Primary | ICD-10-CM

## 2017-10-12 PROCEDURE — 99213 OFFICE O/P EST LOW 20 MIN: CPT | Performed by: STUDENT IN AN ORGANIZED HEALTH CARE EDUCATION/TRAINING PROGRAM

## 2017-10-12 NOTE — PATIENT INSTRUCTIONS
Leg Swelling in Both Legs    Swelling of the feet, ankles, and legs is called edema. It is caused by excess fluid that has collected in the tissues. Extra fluid in the body settles in the lowest part because of gravity.  This is why the legs and feet are · If your healthcare provider says that your leg swelling is caused by venous insufficiency or varicose veins, don't sit or  one place for long periods of time. Take breaks and walk about every few hours. Brisk walking is a good exercise.  It helps

## 2017-10-13 ENCOUNTER — PATIENT OUTREACH (OUTPATIENT)
Dept: CASE MANAGEMENT | Age: 42
End: 2017-10-13

## 2017-10-13 NOTE — PROGRESS NOTES
Reviewed chart and recent office notes. Will f/up for CCM after appt on 10/16.   Coordination of education, review of chart, update to pt record, compilation and mailing resources/materials: Flu education, Why get the flu vaccine, and request to get flu va

## 2017-10-15 NOTE — PROGRESS NOTES
HPI:    Patient ID: Paul Nguyen is a 40year old female. Swelling   This is a recurrent problem. The current episode started 1 to 4 weeks ago. The problem occurs constantly. The problem has been unchanged.  Associated symptoms include arthr daily. Disp: 90 tablet Rfl: 0   Progesterone Micronized (PROMETRIUM) 100 MG Oral Cap Take 1 capsule (100 mg total) by mouth daily.  Disp: 90 capsule Rfl: 0   VENTOLIN  (90 Base) MCG/ACT Inhalation Aero Soln INHALE 2 PUFFS BY MOUTH EVERY 4 HOURS AS NE Musculoskeletal: Normal range of motion. B/l LE edema up to the knees. Tenderness of the plantar surface of the foot b/l. Neurological: She is alert and oriented to person, place, and time. She has normal reflexes. Skin: Skin is warm and dry.    Psy

## 2017-10-16 ENCOUNTER — TELEPHONE (OUTPATIENT)
Dept: INTERNAL MEDICINE CLINIC | Facility: CLINIC | Age: 42
End: 2017-10-16

## 2017-10-16 ENCOUNTER — OFFICE VISIT (OUTPATIENT)
Dept: SURGERY | Facility: CLINIC | Age: 42
End: 2017-10-16

## 2017-10-16 ENCOUNTER — HOSPITAL ENCOUNTER (OUTPATIENT)
Dept: ULTRASOUND IMAGING | Age: 42
Discharge: HOME | End: 2017-10-16
Attending: STUDENT IN AN ORGANIZED HEALTH CARE EDUCATION/TRAINING PROGRAM
Payer: MEDICARE

## 2017-10-16 VITALS
SYSTOLIC BLOOD PRESSURE: 154 MMHG | DIASTOLIC BLOOD PRESSURE: 94 MMHG | BODY MASS INDEX: 42.68 KG/M2 | HEART RATE: 78 BPM | WEIGHT: 250 LBS | HEIGHT: 64 IN

## 2017-10-16 DIAGNOSIS — R60.0 BILATERAL LOWER EXTREMITY EDEMA: ICD-10-CM

## 2017-10-16 DIAGNOSIS — M46.1 SACROILIITIS, NOT ELSEWHERE CLASSIFIED (HCC): Primary | ICD-10-CM

## 2017-10-16 DIAGNOSIS — M47.816 LUMBAR FACET ARTHROPATHY: ICD-10-CM

## 2017-10-16 PROCEDURE — 99213 OFFICE O/P EST LOW 20 MIN: CPT | Performed by: PHYSICIAN ASSISTANT

## 2017-10-16 PROCEDURE — 93970 EXTREMITY STUDY: CPT | Performed by: STUDENT IN AN ORGANIZED HEALTH CARE EDUCATION/TRAINING PROGRAM

## 2017-10-16 NOTE — PROGRESS NOTES
Name: Aj Mena   : 1975   DOS: 10/16/2017     Pain Clinic Follow Up Visit:   Patient presents with:   Follow - Up: after injections      Aj Mena is a 40year old female who presents for recheck of chronic low back Aero Soln INHALE 2 PUFFS BY MOUTH EVERY 4 HOURS AS NEEDED FOR WHEEZING OR SHORTNESS OF BREATH Disp: 18 g Rfl: 5   Umeclidinium Bromide (INCRUSE ELLIPTA) 62.5 MCG/INH Inhalation Aerosol Powder, Breath Activated Inhale 1 puff into the lungs daily.  Disp: 1 ea all.    Medications filled today:  No prescriptions requested or ordered in this encounter  Orders:No orders of the defined types were placed in this encounter.         Radiology orders and consultations:None  The patient indicates understanding of these is

## 2017-10-16 NOTE — TELEPHONE ENCOUNTER
Contacted the pt to confirm what the pt is taking for BP control. The pt did confirm taking chlorthalidone 25mg and losartan 100mg daily. The pt states is taking amlodipine but can not remember what dose.     The pt will contact the office later tod

## 2017-10-16 NOTE — PATIENT INSTRUCTIONS
Refill policies:    • Allow 2-3 business days for refills; controlled substances may take longer.   • Contact your pharmacy at least 5 days prior to running out of medication and have them send an electronic request or submit request through the [facility] have a procedure or additional testing performed. [facility]) will contact your insurance carrier to obtain pre-certification or prior authorization.     Unfortunately, [facility] has seen an increase in denial of payment even though the p

## 2017-10-17 DIAGNOSIS — I10 ESSENTIAL HYPERTENSION: ICD-10-CM

## 2017-10-17 RX ORDER — LOSARTAN POTASSIUM 100 MG/1
TABLET ORAL
Qty: 30 TABLET | Refills: 2 | Status: SHIPPED | OUTPATIENT
Start: 2017-10-17 | End: 2019-09-07

## 2017-10-17 RX ORDER — AMLODIPINE BESYLATE 5 MG/1
TABLET ORAL
Qty: 30 TABLET | Refills: 2 | Status: SHIPPED | OUTPATIENT
Start: 2017-10-17 | End: 2017-12-01 | Stop reason: DRUGHIGH

## 2017-10-17 NOTE — TELEPHONE ENCOUNTER
Spoke with patient and relayed test results. She verbalized understanding and has scheduled and appointment with Dr. Delmis Dos Santos 11/3/17. Patient confirmed her Amlodipine is 10mg. She was instructed to decreased to 5mg.  She verbalized understanding and ag

## 2017-10-17 NOTE — TELEPHONE ENCOUNTER
Notes Recorded by Alejandro Mosqueda MD on 10/16/2017 at 6:59 PM CDT  Venous insufficiency in both lower extremities.  Compression hose, leg elevation recommended

## 2017-10-18 ENCOUNTER — PATIENT OUTREACH (OUTPATIENT)
Dept: CASE MANAGEMENT | Age: 42
End: 2017-10-18

## 2017-10-18 DIAGNOSIS — G89.29 CHRONIC BILATERAL LOW BACK PAIN WITHOUT SCIATICA: ICD-10-CM

## 2017-10-18 DIAGNOSIS — E78.5 HYPERLIPIDEMIA WITH TARGET LDL LESS THAN 130: ICD-10-CM

## 2017-10-18 DIAGNOSIS — J45.50 UNCOMPLICATED SEVERE PERSISTENT ASTHMA: ICD-10-CM

## 2017-10-18 DIAGNOSIS — M54.50 CHRONIC BILATERAL LOW BACK PAIN WITHOUT SCIATICA: ICD-10-CM

## 2017-10-18 DIAGNOSIS — I10 ESSENTIAL HYPERTENSION: ICD-10-CM

## 2017-10-18 PROCEDURE — 99490 CHRNC CARE MGMT STAFF 1ST 20: CPT

## 2017-10-18 NOTE — PROGRESS NOTES
Spoke to pt at length about CCM, current care plan and performed CCM assessment with pt, reviewed meds and compliance. Reviewed pt dx HTN, HLD, asthma, anxiety, and chronic back pain of lumbar spine. Support system: Lives with spouse and children.   Diet:

## 2017-10-20 DIAGNOSIS — J45.50 UNCOMPLICATED SEVERE PERSISTENT ASTHMA: ICD-10-CM

## 2017-10-27 ENCOUNTER — OFFICE VISIT (OUTPATIENT)
Dept: PHYSICAL THERAPY | Age: 42
End: 2017-10-27
Attending: STUDENT IN AN ORGANIZED HEALTH CARE EDUCATION/TRAINING PROGRAM
Payer: MEDICARE

## 2017-10-27 DIAGNOSIS — R60.0 BILATERAL LOWER EXTREMITY EDEMA: ICD-10-CM

## 2017-10-27 PROCEDURE — 97163 PT EVAL HIGH COMPLEX 45 MIN: CPT | Performed by: PHYSICAL THERAPIST

## 2017-10-27 PROCEDURE — 97140 MANUAL THERAPY 1/> REGIONS: CPT | Performed by: PHYSICAL THERAPIST

## 2017-10-27 PROCEDURE — 97530 THERAPEUTIC ACTIVITIES: CPT | Performed by: PHYSICAL THERAPIST

## 2017-10-27 NOTE — PROGRESS NOTES
LOWER EXTREMITY EVALUATION:   Referring Physician: Dr. Lena Caro  Diagnosis: B LE Lymphedema/edema     Date of Service: 10/27/2017     PATIENT SUMMARY   Leigh Morrison is a 40year old y/o female who presents to therapy today with complaints of sleeping, B LE edema that is not controlled, severe asthma. Verner Stabler describes prior level of function cont with chronic low back pain as well as limitation with activity secondary to asthma.  Pt goals include become independent in management and edu in l good and less painful then compression sock. PT edu pt on use of leg betancourt with handles and pt reports increased ease of donning compression sock with less pain.   Pt is not a candidate to learn SMLD secondary to long history of low back surgeries and chr symptoms including vital sign response, edema. Charges: PT Eval x1, Manual:1, Theract:2      Total Timed Treatment: 45 min     Total Treatment Time: 60 min     PLAN OF CARE:    Goals:     Pt will be independent in daily skin care.  10 sessions   Pt questions, please contact me at Dept: [phone]    Sincerely,  Electronically signed by therapist: Denzel Sales, PT    [de-identified] certification required: Yes  I certify the need for these services furnished under this plan of treatment and while under

## 2017-10-30 ENCOUNTER — APPOINTMENT (OUTPATIENT)
Dept: PHYSICAL THERAPY | Age: 42
End: 2017-10-30
Attending: STUDENT IN AN ORGANIZED HEALTH CARE EDUCATION/TRAINING PROGRAM
Payer: MEDICARE

## 2017-11-01 ENCOUNTER — OFFICE VISIT (OUTPATIENT)
Dept: PHYSICAL THERAPY | Age: 42
End: 2017-11-01
Attending: STUDENT IN AN ORGANIZED HEALTH CARE EDUCATION/TRAINING PROGRAM
Payer: MEDICARE

## 2017-11-01 PROCEDURE — 97140 MANUAL THERAPY 1/> REGIONS: CPT | Performed by: PHYSICAL THERAPIST

## 2017-11-01 PROCEDURE — 97530 THERAPEUTIC ACTIVITIES: CPT | Performed by: PHYSICAL THERAPIST

## 2017-11-01 NOTE — PROGRESS NOTES
Dx: LYNNETTE ARREDONDO lymphedema         Authorized # of Visits:  10         Next MD visit: none scheduled  Fall Risk: standard         Precautions: n/a             Subjective:   Pt reports slight increase in discomfort following IE with MLD to abdomin and R thigh.   Pt PT edu to wear compression garment daily and at night as pt tolerates and alternating LE each day or as needed. Pt reports understanding. Goals:   Pt will be independent in daily skin care.  10 sessions   Pt will tolerate BLE modified compression vi

## 2017-11-02 ENCOUNTER — OFFICE VISIT (OUTPATIENT)
Dept: INTERNAL MEDICINE CLINIC | Facility: CLINIC | Age: 42
End: 2017-11-02

## 2017-11-02 VITALS
RESPIRATION RATE: 16 BRPM | HEART RATE: 79 BPM | BODY MASS INDEX: 42.68 KG/M2 | SYSTOLIC BLOOD PRESSURE: 132 MMHG | DIASTOLIC BLOOD PRESSURE: 86 MMHG | HEIGHT: 64 IN | TEMPERATURE: 98 F | WEIGHT: 250 LBS | OXYGEN SATURATION: 98 %

## 2017-11-02 DIAGNOSIS — R60.0 EDEMA, LOWER EXTREMITY: Primary | ICD-10-CM

## 2017-11-02 DIAGNOSIS — I10 ESSENTIAL HYPERTENSION: ICD-10-CM

## 2017-11-02 PROCEDURE — 99214 OFFICE O/P EST MOD 30 MIN: CPT | Performed by: STUDENT IN AN ORGANIZED HEALTH CARE EDUCATION/TRAINING PROGRAM

## 2017-11-02 RX ORDER — AMLODIPINE BESYLATE 10 MG/1
TABLET ORAL
COMMUNITY
Start: 2017-10-17 | End: 2017-11-02 | Stop reason: DRUGHIGH

## 2017-11-02 RX ORDER — FUROSEMIDE 20 MG/1
20 TABLET ORAL DAILY
COMMUNITY
Start: 2017-10-17 | End: 2018-09-21

## 2017-11-02 NOTE — PROGRESS NOTES
[facility]    REASON FOR VISIT:    Current Complaints: Patient presents with:  Blood Pressure: 2 w f/u  Edema: 2 w f.u      HPI:  Jocelyne Almanza is a 40year old female who presents for a f/u visit for LE edema and HTN.   Reports her ed Disp: 18 g Rfl: 2   AMLODIPINE BESYLATE 5 MG Oral Tab TAKE 1 TABLET BY MOUTH DAILY Disp: 30 tablet Rfl: 2   LOSARTAN 100 MG Oral Tab TAKE 1 TABLET BY MOUTH DAILY Disp: 30 tablet Rfl: 2   HYDROcodone-acetaminophen  MG Oral Tab TK ONE T PO Q 6 H PRN P. change and rash.      EXAM:   /86   Pulse 79   Temp 98 °F (36.7 °C) (Oral)   Resp 16   Ht 64\"   Wt 250 lb   SpO2 98%   BMI 42.91 kg/m²    Wt Readings from Last 6 Encounters:  11/02/17 : 250 lb  10/16/17 : 250 lb  10/12/17 : 255 lb  09/06/17 : 255 lb MD  11/2/2017  11:24 AM

## 2017-11-03 ENCOUNTER — APPOINTMENT (OUTPATIENT)
Dept: PHYSICAL THERAPY | Age: 42
End: 2017-11-03
Payer: MEDICARE

## 2017-11-06 ENCOUNTER — TELEPHONE (OUTPATIENT)
Dept: INTERNAL MEDICINE CLINIC | Facility: CLINIC | Age: 42
End: 2017-11-06

## 2017-11-06 ENCOUNTER — PATIENT OUTREACH (OUTPATIENT)
Dept: CASE MANAGEMENT | Age: 42
End: 2017-11-06

## 2017-11-06 ENCOUNTER — APPOINTMENT (OUTPATIENT)
Dept: PHYSICAL THERAPY | Age: 42
End: 2017-11-06
Attending: STUDENT IN AN ORGANIZED HEALTH CARE EDUCATION/TRAINING PROGRAM
Payer: MEDICARE

## 2017-11-06 DIAGNOSIS — J45.50 UNCOMPLICATED SEVERE PERSISTENT ASTHMA: ICD-10-CM

## 2017-11-06 DIAGNOSIS — I10 ESSENTIAL HYPERTENSION: ICD-10-CM

## 2017-11-06 DIAGNOSIS — E78.5 HYPERLIPIDEMIA WITH TARGET LDL LESS THAN 130: ICD-10-CM

## 2017-11-06 DIAGNOSIS — I87.2 VENOUS INSUFFICIENCY OF BOTH LOWER EXTREMITIES: Primary | ICD-10-CM

## 2017-11-06 PROCEDURE — 99490 CHRNC CARE MGMT STAFF 1ST 20: CPT

## 2017-11-06 NOTE — PROGRESS NOTES
Spoke to pt at length about CCM, current care plan and performed CCM assessment with pt, reviewed meds and compliance. Reviewed pt dx HTN, HLD, anxiety, mild persistent asthma, and chronic back pain. Support system: Lives with spouse.   Sister and family n

## 2017-11-06 NOTE — TELEPHONE ENCOUNTER
Received form from [employer] for medical compression garments - fill out and fax back to [phone]. Form in triage.

## 2017-11-08 ENCOUNTER — TELEPHONE (OUTPATIENT)
Dept: INTERNAL MEDICINE CLINIC | Facility: CLINIC | Age: 42
End: 2017-11-08

## 2017-11-08 ENCOUNTER — OFFICE VISIT (OUTPATIENT)
Dept: PHYSICAL THERAPY | Age: 42
End: 2017-11-08
Attending: STUDENT IN AN ORGANIZED HEALTH CARE EDUCATION/TRAINING PROGRAM
Payer: MEDICARE

## 2017-11-08 PROCEDURE — 97140 MANUAL THERAPY 1/> REGIONS: CPT | Performed by: PHYSICAL THERAPIST

## 2017-11-08 PROCEDURE — 97016 VASOPNEUMATIC DEVICE THERAPY: CPT | Performed by: PHYSICAL THERAPIST

## 2017-11-08 NOTE — TELEPHONE ENCOUNTER
Complete certificate of medical necessity for pneumatic compression devices. Return forms to [facility], fax#[phone]. Forms in triage.

## 2017-11-08 NOTE — TELEPHONE ENCOUNTER
Dr. Bladimir Cortez only recommended compression stockings. She does not recommend this device. Form returned with DENIED.

## 2017-11-08 NOTE — PROGRESS NOTES
Dx: LYNNETTE ARREDONDO lymphedema         Authorized # of Visits:  10         Next MD visit: none scheduled  Fall Risk: standard         Precautions: n/a             Subjective:   Pt reports good compliance with bio compression velcro wrap with wearing on L during the d 70: 2 cm. Pt reports mild discomfort in foot with pressure being a little to high. Pt hamlet MLD well with groin node stimulation and supine and modified prone B LE sequencing completed. Pt reports good comfort with pump in calf and especially thigh.   PT re Manual:2, pump:1       Total Timed Treatment: 60 min  Total Treatment Time: 60 min

## 2017-11-13 ENCOUNTER — OFFICE VISIT (OUTPATIENT)
Dept: PHYSICAL THERAPY | Age: 42
End: 2017-11-13
Attending: STUDENT IN AN ORGANIZED HEALTH CARE EDUCATION/TRAINING PROGRAM
Payer: MEDICARE

## 2017-11-13 ENCOUNTER — TELEPHONE (OUTPATIENT)
Dept: INTERNAL MEDICINE CLINIC | Facility: CLINIC | Age: 42
End: 2017-11-13

## 2017-11-13 PROCEDURE — 97530 THERAPEUTIC ACTIVITIES: CPT | Performed by: PHYSICAL THERAPIST

## 2017-11-13 PROCEDURE — 97140 MANUAL THERAPY 1/> REGIONS: CPT | Performed by: PHYSICAL THERAPIST

## 2017-11-13 PROCEDURE — 97110 THERAPEUTIC EXERCISES: CPT | Performed by: PHYSICAL THERAPIST

## 2017-11-13 NOTE — PROGRESS NOTES
Dx: B LE lymphedema         Authorized # of Visits:  10         Next MD visit: none scheduled  Fall Risk: standard         Precautions: n/a             Subjective:   Pt reports that she has good compliance with use of velcro wrap on L LE during the day and following new stretches. Goals:   Pt will be independent in daily skin care. 10 sessions   Pt will tolerate BLE modified compression via Tensogrip sleeve for 10 hours. 10 sessions   Pt will be independent in decongestive exercises.  10 sessions    Pt wi

## 2017-11-13 NOTE — TELEPHONE ENCOUNTER
Patient would like the pneumatic compression device instead of the compression stockings. She says they use this at PT and it is helping a lot more than the compression stocking. Per Dr. Jansen Peoples okay for order.   Advised pt to have the company resend the

## 2017-11-15 ENCOUNTER — OFFICE VISIT (OUTPATIENT)
Dept: PHYSICAL THERAPY | Age: 42
End: 2017-11-15
Attending: STUDENT IN AN ORGANIZED HEALTH CARE EDUCATION/TRAINING PROGRAM
Payer: MEDICARE

## 2017-11-15 PROCEDURE — 97530 THERAPEUTIC ACTIVITIES: CPT | Performed by: PHYSICAL THERAPIST

## 2017-11-15 PROCEDURE — 97016 VASOPNEUMATIC DEVICE THERAPY: CPT | Performed by: PHYSICAL THERAPIST

## 2017-11-15 PROCEDURE — 97140 MANUAL THERAPY 1/> REGIONS: CPT | Performed by: PHYSICAL THERAPIST

## 2017-11-15 NOTE — PROGRESS NOTES
Dx: LYNNETTE ARREDONDO lymphedema         Authorized # of Visits:  10         Next MD visit: none scheduled  Fall Risk: standard         Precautions: n/a             Subjective:   Pt reports that she had increased tightness in L calf following last tx session with incre sessions    Pt will tolerate B LE short stretch compression bandaging for 20-23 hours. 10 sessions   Pt/Caregiver will be independent in B LE short stretch compression bandaging.  10 sessions    Reduce B LE lymphedema volume by 1-3cm to allow pt to fit into

## 2017-11-20 ENCOUNTER — APPOINTMENT (OUTPATIENT)
Dept: PHYSICAL THERAPY | Age: 42
End: 2017-11-20
Attending: STUDENT IN AN ORGANIZED HEALTH CARE EDUCATION/TRAINING PROGRAM
Payer: MEDICARE

## 2017-11-20 ENCOUNTER — TELEPHONE (OUTPATIENT)
Dept: PHYSICAL THERAPY | Age: 42
End: 2017-11-20

## 2017-11-21 ENCOUNTER — TELEPHONE (OUTPATIENT)
Dept: INTERNAL MEDICINE CLINIC | Facility: CLINIC | Age: 42
End: 2017-11-21

## 2017-11-21 NOTE — TELEPHONE ENCOUNTER
Originally order for pneumatic leg pump was denied but per task on 11/13/17 Dr. Merline Rubin approved. Pt notified we will contact [facility] and have them refax order for approval.      Spoke with [facility] regarding order.  She will have Estefania Perez call the o

## 2017-11-21 NOTE — TELEPHONE ENCOUNTER
Patient called and wants to speak to a RN regarding her leg pump; she spoke with the vendor, and was told it was going approved, but now it is being denied. Please call patient back.

## 2017-11-22 ENCOUNTER — TELEPHONE (OUTPATIENT)
Dept: INTERNAL MEDICINE CLINIC | Facility: CLINIC | Age: 42
End: 2017-11-22

## 2017-11-22 NOTE — TELEPHONE ENCOUNTER
Marilyn from [employer] calling to speak w/ a nurse regarding the diagnosis listed for the pt's leg pump -

## 2017-11-22 NOTE — TELEPHONE ENCOUNTER
Marco Sanders per PT note pt's dx is bilateral lower extremity lymphedema. Dominga Lockhart is asking for verbal ok to change dx from venous insuff to lymphedema. See PT progress note 11/15/17.       Ok per Dr. Bladimir Cortez to change dx to bilateral lower extremity ly

## 2017-11-27 ENCOUNTER — OFFICE VISIT (OUTPATIENT)
Dept: PHYSICAL THERAPY | Age: 42
End: 2017-11-27
Attending: PHYSICIAN ASSISTANT
Payer: MEDICARE

## 2017-11-27 PROCEDURE — 97016 VASOPNEUMATIC DEVICE THERAPY: CPT | Performed by: PHYSICAL THERAPIST

## 2017-11-27 PROCEDURE — 97530 THERAPEUTIC ACTIVITIES: CPT | Performed by: PHYSICAL THERAPIST

## 2017-11-27 PROCEDURE — 97140 MANUAL THERAPY 1/> REGIONS: CPT | Performed by: PHYSICAL THERAPIST

## 2017-11-27 NOTE — PROGRESS NOTES
Dx: B LE lymphedema         Authorized # of Visits:  10         Next MD visit: none scheduled  Fall Risk: standard         Precautions: n/a             Subjective:   Pt reports that the pump script was sent to doctors last week and that the pump should be home.  Pt cont to be unable to perform SMLD secondary to low back pain and limitations as well as chronic pain. PT applied pump to R LE and performed MLD to L LE with groin as well as supine and modified prone sequencing completed.   Pt hamlet tx well with imp Compression:  Regular socks with bio compression velcro wrap to L calf.      Charges: Manual:3,  Theract:1,Pump:1   Total Timed Treatment: 55 min Total Treatment Time: 55 min

## 2017-11-29 ENCOUNTER — OFFICE VISIT (OUTPATIENT)
Dept: PHYSICAL THERAPY | Age: 42
End: 2017-11-29
Attending: STUDENT IN AN ORGANIZED HEALTH CARE EDUCATION/TRAINING PROGRAM
Payer: MEDICARE

## 2017-11-29 PROCEDURE — 97016 VASOPNEUMATIC DEVICE THERAPY: CPT | Performed by: PHYSICAL THERAPIST

## 2017-11-29 PROCEDURE — 97530 THERAPEUTIC ACTIVITIES: CPT | Performed by: PHYSICAL THERAPIST

## 2017-11-29 PROCEDURE — 97140 MANUAL THERAPY 1/> REGIONS: CPT | Performed by: PHYSICAL THERAPIST

## 2017-11-29 NOTE — PROGRESS NOTES
Dx: LYNNETTE ARREDONDO lymphedema         Authorized # of Visits:  10         Next MD visit: none scheduled  Fall Risk: standard         Precautions: n/a             Subjective:   Pt reports that she is having more fatigue overall with 9-10 hours of activity one day a w week and new compression garment with larger size by next week. Goals:   Pt will be independent in daily skin care. 10 sessions   Pt will tolerate BLE modified compression via Tensogrip sleeve for 10 hours.  10 sessions   Pt will be independent in decon

## 2017-12-01 ENCOUNTER — OFFICE VISIT (OUTPATIENT)
Dept: INTERNAL MEDICINE CLINIC | Facility: CLINIC | Age: 42
End: 2017-12-01

## 2017-12-01 VITALS
DIASTOLIC BLOOD PRESSURE: 92 MMHG | SYSTOLIC BLOOD PRESSURE: 136 MMHG | OXYGEN SATURATION: 96 % | WEIGHT: 250 LBS | BODY MASS INDEX: 42.68 KG/M2 | HEART RATE: 92 BPM | TEMPERATURE: 98 F | HEIGHT: 64 IN | RESPIRATION RATE: 16 BRPM

## 2017-12-01 DIAGNOSIS — I10 ESSENTIAL HYPERTENSION: Primary | ICD-10-CM

## 2017-12-01 DIAGNOSIS — Z79.899 ENCOUNTER FOR LONG-TERM (CURRENT) USE OF MEDICATIONS: ICD-10-CM

## 2017-12-01 DIAGNOSIS — I87.2 VENOUS INSUFFICIENCY OF BOTH LOWER EXTREMITIES: ICD-10-CM

## 2017-12-01 PROCEDURE — 99214 OFFICE O/P EST MOD 30 MIN: CPT | Performed by: PHYSICIAN ASSISTANT

## 2017-12-01 RX ORDER — AMLODIPINE BESYLATE 10 MG/1
10 TABLET ORAL DAILY
Qty: 30 TABLET | Refills: 2 | Status: SHIPPED | OUTPATIENT
Start: 2017-12-01 | End: 2018-07-02

## 2017-12-01 NOTE — PROGRESS NOTES
Estrella Solis is a 40year old female. HPI:   Patient presents for recheck of her hypertension.  Pt has been taking medications as instructed, no medication side effects, home BP monitoring in the range of 841-995'T systolic and 04-07'F rosenberg tablet (1 mg total) by mouth daily. Disp: 90 tablet Rfl: 0   Progesterone Micronized (PROMETRIUM) 100 MG Oral Cap Take 1 capsule (100 mg total) by mouth daily.  Disp: 90 capsule Rfl: 0   Umeclidinium Bromide (INCRUSE ELLIPTA) 62.5 MCG/INH Inhalation Aerosol tobacco: Never Used                      Alcohol use: Yes           0.0 oz/week     Comment: rare        REVIEW OF SYSTEMS:   GENERAL HEALTH: feels well otherwise  SKIN: denies any unusual skin lesions or rashes  RESPIRATORY: denies shortness of breath wit

## 2017-12-02 ENCOUNTER — LAB ENCOUNTER (OUTPATIENT)
Dept: LAB | Facility: HOSPITAL | Age: 42
End: 2017-12-02
Attending: PHYSICIAN ASSISTANT
Payer: MEDICARE

## 2017-12-02 DIAGNOSIS — Z79.899 ENCOUNTER FOR LONG-TERM (CURRENT) USE OF MEDICATIONS: ICD-10-CM

## 2017-12-02 DIAGNOSIS — I10 ESSENTIAL HYPERTENSION: ICD-10-CM

## 2017-12-02 PROCEDURE — 81003 URINALYSIS AUTO W/O SCOPE: CPT

## 2017-12-02 PROCEDURE — 80061 LIPID PANEL: CPT

## 2017-12-02 PROCEDURE — 36415 COLL VENOUS BLD VENIPUNCTURE: CPT

## 2017-12-02 PROCEDURE — 85025 COMPLETE CBC W/AUTO DIFF WBC: CPT

## 2017-12-02 PROCEDURE — 80053 COMPREHEN METABOLIC PANEL: CPT

## 2017-12-02 PROCEDURE — 84443 ASSAY THYROID STIM HORMONE: CPT

## 2017-12-02 PROCEDURE — 83036 HEMOGLOBIN GLYCOSYLATED A1C: CPT

## 2017-12-04 ENCOUNTER — OFFICE VISIT (OUTPATIENT)
Dept: PHYSICAL THERAPY | Age: 42
End: 2017-12-04
Attending: STUDENT IN AN ORGANIZED HEALTH CARE EDUCATION/TRAINING PROGRAM
Payer: MEDICARE

## 2017-12-04 PROCEDURE — 97016 VASOPNEUMATIC DEVICE THERAPY: CPT | Performed by: PHYSICAL THERAPIST

## 2017-12-04 PROCEDURE — 97530 THERAPEUTIC ACTIVITIES: CPT | Performed by: PHYSICAL THERAPIST

## 2017-12-04 PROCEDURE — 97140 MANUAL THERAPY 1/> REGIONS: CPT | Performed by: PHYSICAL THERAPIST

## 2017-12-04 NOTE — PROGRESS NOTES
Dx: LYNNETTE ARREDONDO lymphedema         Authorized # of Visits:  10         Next MD visit: none scheduled  Fall Risk: standard         Precautions: n/a             Subjective:   Pt reports that she got new compression garment on Friday afternoon and started wearing on tolerate BLE modified compression via Tensogrip sleeve for 10 hours. 10 sessions   Pt will be independent in decongestive exercises. 10 sessions    Pt will be independent in self-manual lymph drainage.  10 sessions    Pt will tolerate B LE short stretch com

## 2017-12-06 ENCOUNTER — OFFICE VISIT (OUTPATIENT)
Dept: PHYSICAL THERAPY | Age: 42
End: 2017-12-06
Attending: STUDENT IN AN ORGANIZED HEALTH CARE EDUCATION/TRAINING PROGRAM
Payer: MEDICARE

## 2017-12-06 PROCEDURE — 97140 MANUAL THERAPY 1/> REGIONS: CPT | Performed by: PHYSICAL THERAPIST

## 2017-12-06 PROCEDURE — 97530 THERAPEUTIC ACTIVITIES: CPT | Performed by: PHYSICAL THERAPIST

## 2017-12-06 PROCEDURE — 97016 VASOPNEUMATIC DEVICE THERAPY: CPT | Performed by: PHYSICAL THERAPIST

## 2017-12-06 NOTE — PROGRESS NOTES
Dx: LYNNETTE ARREDONDO lymphedema         Authorized # of Visits:  10         Next MD visit: none scheduled  Fall Risk: standard         Precautions: n/a             Subjective:   Pt reports that she received her pneumatic pump yesterday but has not had a chance to use and to adjust as needed. Pt reports understanding. Goals:   Pt will be independent in daily skin care. 10 sessions   Pt will tolerate BLE modified compression via Tensogrip sleeve for 10 hours.  10 sessions   Pt will be independent in decongestive exer

## 2017-12-07 ENCOUNTER — PATIENT OUTREACH (OUTPATIENT)
Dept: CASE MANAGEMENT | Age: 42
End: 2017-12-07

## 2017-12-07 NOTE — PROGRESS NOTES
Reviewed chart for ccm. LVM for pt to call back. Time spent for ccm: 5 min collecting, reviewing pt data, communication and documentation.

## 2017-12-15 ENCOUNTER — OFFICE VISIT (OUTPATIENT)
Dept: PHYSICAL THERAPY | Age: 42
End: 2017-12-15
Attending: PHYSICIAN ASSISTANT
Payer: MEDICARE

## 2017-12-15 PROCEDURE — 97530 THERAPEUTIC ACTIVITIES: CPT | Performed by: PHYSICAL THERAPIST

## 2017-12-15 PROCEDURE — 97016 VASOPNEUMATIC DEVICE THERAPY: CPT | Performed by: PHYSICAL THERAPIST

## 2017-12-15 PROCEDURE — 97140 MANUAL THERAPY 1/> REGIONS: CPT | Performed by: PHYSICAL THERAPIST

## 2017-12-15 NOTE — PROGRESS NOTES
Dx: B LE lymphedema         Authorized # of Visits:  10         Next MD visit: none scheduled  Fall Risk: standard         Precautions: n/a             Subjective:   Pt reports saw [employer] [employer] on Wed and has increased amb and had more overall edema in B LE with constraints and pt will return following the first of the year once she is able to purchase the second garment. Goals:   Pt will be independent in daily skin care.  10 sessions   Pt will tolerate BLE modified compression via Tensogrip sleeve for 10 hour

## 2017-12-20 ENCOUNTER — OFFICE VISIT (OUTPATIENT)
Dept: INTERNAL MEDICINE CLINIC | Facility: CLINIC | Age: 42
End: 2017-12-20

## 2017-12-20 VITALS
WEIGHT: 252.5 LBS | TEMPERATURE: 98 F | DIASTOLIC BLOOD PRESSURE: 102 MMHG | HEIGHT: 64 IN | HEART RATE: 92 BPM | BODY MASS INDEX: 43.11 KG/M2 | OXYGEN SATURATION: 97 % | SYSTOLIC BLOOD PRESSURE: 148 MMHG | RESPIRATION RATE: 18 BRPM

## 2017-12-20 DIAGNOSIS — I10 ESSENTIAL HYPERTENSION: Primary | ICD-10-CM

## 2017-12-20 DIAGNOSIS — K66.0 ABDOMINAL ADHESIONS: ICD-10-CM

## 2017-12-20 DIAGNOSIS — J01.00 ACUTE NON-RECURRENT MAXILLARY SINUSITIS: ICD-10-CM

## 2017-12-20 PROCEDURE — 99214 OFFICE O/P EST MOD 30 MIN: CPT | Performed by: INTERNAL MEDICINE

## 2017-12-20 RX ORDER — PREDNISONE 20 MG/1
TABLET ORAL
Qty: 14 TABLET | Refills: 0 | Status: SHIPPED | OUTPATIENT
Start: 2017-12-20 | End: 2018-01-27 | Stop reason: ALTCHOICE

## 2017-12-20 RX ORDER — HYDRALAZINE HYDROCHLORIDE 25 MG/1
25 TABLET, FILM COATED ORAL 3 TIMES DAILY
Qty: 90 TABLET | Refills: 0 | Status: SHIPPED | OUTPATIENT
Start: 2017-12-20 | End: 2018-01-20

## 2017-12-20 RX ORDER — AMOXICILLIN AND CLAVULANATE POTASSIUM 875; 125 MG/1; MG/1
1 TABLET, FILM COATED ORAL 2 TIMES DAILY
Qty: 20 TABLET | Refills: 0 | Status: SHIPPED | OUTPATIENT
Start: 2017-12-20 | End: 2017-12-30

## 2017-12-20 NOTE — PATIENT INSTRUCTIONS
Eating Heart-Healthy Foods  Eating has a big impact on your heart health. In fact, eating healthier can improve several of your heart risks at once. For instance, it helps you manage weight, cholesterol, and blood pressure.  Here are ideas to help you gigi Aim to make these foods staples of your diet. If you have diabetes, you may have different recommendations than what is listed here:  · Fruits and vegetable provide plenty of nutrients without a lot of calories.  At meals, fill half your plate with these fo · Choose ingredients that spice up your food without adding calories, fat, or sodium. Try these items: horseradish, hot sauce, lemon, mustard, nonfat salad dressings, and vinegar.  For salt-free herbs and spices, try basil, cilantro, cinnamon, pepper, and r

## 2017-12-20 NOTE — PROGRESS NOTES
HPI:    Patient ID: Vladimir Bah is a 40year old female. Hypertension   This is a chronic problem. The current episode started more than 1 year ago. The problem is uncontrolled.  Pertinent negatives include no anxiety, blurred vision, chest tablet Rfl: 0   predniSONE 20 MG Oral Tab Take 2 tablets by mouth daily x 7 days Disp: 14 tablet Rfl: 0   hydrALAzine HCl 25 MG Oral Tab Take 1 tablet (25 mg total) by mouth 3 (three) times daily.  Disp: 90 tablet Rfl: 0   AmLODIPine Besylate 10 MG Oral Tab Mucosal edema present. Right sinus exhibits maxillary sinus tenderness. Right sinus exhibits no frontal sinus tenderness. Left sinus exhibits maxillary sinus tenderness. Left sinus exhibits no frontal sinus tenderness. Neck: Normal range of motion.    Car

## 2017-12-21 DIAGNOSIS — J45.50 UNCOMPLICATED SEVERE PERSISTENT ASTHMA: ICD-10-CM

## 2017-12-22 ENCOUNTER — OFFICE VISIT (OUTPATIENT)
Dept: PHYSICAL THERAPY | Age: 42
End: 2017-12-22
Attending: INTERNAL MEDICINE
Payer: MEDICARE

## 2017-12-22 PROCEDURE — 97530 THERAPEUTIC ACTIVITIES: CPT | Performed by: PHYSICAL THERAPIST

## 2017-12-22 PROCEDURE — 97016 VASOPNEUMATIC DEVICE THERAPY: CPT | Performed by: PHYSICAL THERAPIST

## 2017-12-22 PROCEDURE — 97140 MANUAL THERAPY 1/> REGIONS: CPT | Performed by: PHYSICAL THERAPIST

## 2017-12-22 NOTE — PROGRESS NOTES
Discharge Summary    Pt has attended 11, cancelled 1, and no shown 0 visits in Physical Therapy. Subjective:   Pt reports that she is ready for DC with independence in management of lymphedema in B LE at this time.  Pt reports using her pump approx one t felt good overall and pt would like a size 5. Goals:   Pt will be independent in daily skin care. 10 sessions. met   Pt will tolerate BLE modified compression via Tensogrip sleeve for 10 hours. 10 sessions.  met   Pt will be independent in decongestive scheduled  Fall Risk: standard         Precautions: n/a             Subjective:   Pt reports that she is ready for DC with independence in management of lymphedema in B LE at this time.  Pt reports using her pump approx one time every other day and does B L size 5. Goals:   Pt will be independent in daily skin care. 10 sessions. met   Pt will tolerate BLE modified compression via Tensogrip sleeve for 10 hours. 10 sessions. met   Pt will be independent in decongestive exercises. 10 sessions .  met   Pt larisa

## 2017-12-29 DIAGNOSIS — I10 ESSENTIAL HYPERTENSION: ICD-10-CM

## 2017-12-29 RX ORDER — CHLORTHALIDONE 25 MG/1
TABLET ORAL
Qty: 15 TABLET | Refills: 0 | Status: SHIPPED | OUTPATIENT
Start: 2017-12-29 | End: 2019-05-02

## 2018-01-10 ENCOUNTER — OFFICE VISIT (OUTPATIENT)
Dept: PHYSICAL THERAPY | Age: 43
End: 2018-01-10
Attending: INTERNAL MEDICINE
Payer: MEDICARE

## 2018-01-10 PROCEDURE — 97535 SELF CARE MNGMENT TRAINING: CPT

## 2018-01-10 PROCEDURE — 97163 PT EVAL HIGH COMPLEX 45 MIN: CPT

## 2018-01-10 PROCEDURE — 97112 NEUROMUSCULAR REEDUCATION: CPT

## 2018-01-11 ENCOUNTER — TELEPHONE (OUTPATIENT)
Dept: INTERNAL MEDICINE CLINIC | Facility: CLINIC | Age: 43
End: 2018-01-11

## 2018-01-11 ENCOUNTER — PATIENT OUTREACH (OUTPATIENT)
Dept: CASE MANAGEMENT | Age: 43
End: 2018-01-11

## 2018-01-11 DIAGNOSIS — M54.50 CHRONIC BILATERAL LOW BACK PAIN WITHOUT SCIATICA: ICD-10-CM

## 2018-01-11 DIAGNOSIS — E78.2 MIXED HYPERLIPIDEMIA: ICD-10-CM

## 2018-01-11 DIAGNOSIS — J45.50 UNCOMPLICATED SEVERE PERSISTENT ASTHMA: ICD-10-CM

## 2018-01-11 DIAGNOSIS — G89.29 CHRONIC BILATERAL LOW BACK PAIN WITHOUT SCIATICA: ICD-10-CM

## 2018-01-11 DIAGNOSIS — I10 ESSENTIAL HYPERTENSION: ICD-10-CM

## 2018-01-11 PROCEDURE — 99490 CHRNC CARE MGMT STAFF 1ST 20: CPT

## 2018-01-11 NOTE — TELEPHONE ENCOUNTER
Spoke to pt for ccm today. Pt was to come in for 2 week f/up to check her blood pressure however has not been able to. Pt states she has been checking it daily at home and ranging 130-140/80's since starting new Bp meds prescribed by Juliana [facility] [place] last month.

## 2018-01-11 NOTE — PROGRESS NOTES
Spoke to pt at length about CCM, current care plan and performed CCM assessment with pt, reviewed meds and compliance. Reviewed pt dx HTN, HLD, asthma, chronic pain and obesity. Support system: Lives with spouse and family.   Diet: Discussed in length with monthly with pt by revisiting goal and barriers pt is having. •   o Follow up? F/up after appt with PCP office on 01/27/18 to address goal and progress.        Follow up:  • You can call me anytime you have a question or need help with achieving your goals

## 2018-01-17 ENCOUNTER — TELEPHONE (OUTPATIENT)
Dept: INTERNAL MEDICINE CLINIC | Facility: CLINIC | Age: 43
End: 2018-01-17

## 2018-01-17 ENCOUNTER — OFFICE VISIT (OUTPATIENT)
Dept: PHYSICAL THERAPY | Age: 43
End: 2018-01-17
Attending: INTERNAL MEDICINE
Payer: MEDICARE

## 2018-01-17 PROCEDURE — 97112 NEUROMUSCULAR REEDUCATION: CPT

## 2018-01-17 PROCEDURE — 97535 SELF CARE MNGMENT TRAINING: CPT

## 2018-01-17 PROCEDURE — 97140 MANUAL THERAPY 1/> REGIONS: CPT

## 2018-01-17 PROCEDURE — 97035 APP MDLTY 1+ULTRASOUND EA 15: CPT

## 2018-01-17 NOTE — TELEPHONE ENCOUNTER
Patient called and requested a new form to be filled out for her placard. She stated her current one has ; please call patient back.

## 2018-01-17 NOTE — TELEPHONE ENCOUNTER
Form completed and placed on Dr. Kanu Perez desk for signature.   Pt aware it will be ready for  tomorrow after 12pm.     Time Spent:  2 min

## 2018-01-17 NOTE — PROGRESS NOTES
Dx: Abdominal adhesions (K66.0)             Authorized # of Visits:  2/12         Next MD visit: none scheduled  Fall Risk: standard         Precautions: n/a             Subjective: Forgot my log at home.   Patient was measured for compression garments toda Juwan Omar would benefit from skilled Pelvic Physical Therapy  for manual interventions/ US to reduce scar tissue adhesions; Visceral mobility to increase peristalsis; and once pain subsides core pelvic muscle brace stabilization to prevent KELSI.       Goals: Date: 1/17/2018 TX#: 2/12 Date:               TX#: 3/   Date:               TX#: 4/ Date:               TX#: 5/ Date:               TX#: 6/ Date:               TX#: 7/ Date:               TX#: 8/   US 1.2 w/cm2, 100%, 10'  Horizontal/ Vertical scar

## 2018-01-20 DIAGNOSIS — I10 ESSENTIAL HYPERTENSION: ICD-10-CM

## 2018-01-22 RX ORDER — HYDRALAZINE HYDROCHLORIDE 25 MG/1
TABLET, FILM COATED ORAL
Qty: 90 TABLET | Refills: 0 | Status: ON HOLD | OUTPATIENT
Start: 2018-01-22 | End: 2018-05-07

## 2018-01-24 ENCOUNTER — APPOINTMENT (OUTPATIENT)
Dept: PHYSICAL THERAPY | Age: 43
End: 2018-01-24
Attending: INTERNAL MEDICINE
Payer: MEDICARE

## 2018-01-27 ENCOUNTER — OFFICE VISIT (OUTPATIENT)
Dept: INTERNAL MEDICINE CLINIC | Facility: CLINIC | Age: 43
End: 2018-01-27

## 2018-01-27 VITALS
HEIGHT: 64 IN | OXYGEN SATURATION: 96 % | WEIGHT: 253 LBS | SYSTOLIC BLOOD PRESSURE: 138 MMHG | RESPIRATION RATE: 18 BRPM | DIASTOLIC BLOOD PRESSURE: 94 MMHG | BODY MASS INDEX: 43.19 KG/M2 | TEMPERATURE: 98 F | HEART RATE: 101 BPM

## 2018-01-27 DIAGNOSIS — D48.5 NEOPLASM OF UNCERTAIN BEHAVIOR OF SKIN: ICD-10-CM

## 2018-01-27 DIAGNOSIS — J45.50 UNCOMPLICATED SEVERE PERSISTENT ASTHMA: ICD-10-CM

## 2018-01-27 DIAGNOSIS — M54.50 CHRONIC BILATERAL LOW BACK PAIN WITHOUT SCIATICA: ICD-10-CM

## 2018-01-27 DIAGNOSIS — G89.29 CHRONIC BILATERAL LOW BACK PAIN WITHOUT SCIATICA: ICD-10-CM

## 2018-01-27 DIAGNOSIS — Z13.31 DEPRESSION SCREENING: ICD-10-CM

## 2018-01-27 DIAGNOSIS — Z00.00 ENCOUNTER FOR ANNUAL HEALTH EXAMINATION: Primary | ICD-10-CM

## 2018-01-27 DIAGNOSIS — J01.41 ACUTE RECURRENT PANSINUSITIS: ICD-10-CM

## 2018-01-27 PROCEDURE — G0444 DEPRESSION SCREEN ANNUAL: HCPCS | Performed by: PHYSICIAN ASSISTANT

## 2018-01-27 PROCEDURE — G0439 PPPS, SUBSEQ VISIT: HCPCS | Performed by: PHYSICIAN ASSISTANT

## 2018-01-27 RX ORDER — PREDNISONE 20 MG/1
TABLET ORAL
Qty: 18 TABLET | Refills: 0 | Status: SHIPPED | OUTPATIENT
Start: 2018-01-27 | End: 2018-02-07 | Stop reason: ALTCHOICE

## 2018-01-27 RX ORDER — DOXYCYCLINE HYCLATE 100 MG/1
100 CAPSULE ORAL 2 TIMES DAILY
Qty: 20 CAPSULE | Refills: 0 | Status: SHIPPED | OUTPATIENT
Start: 2018-01-27 | End: 2018-02-07 | Stop reason: ALTCHOICE

## 2018-01-27 RX ORDER — ALBUTEROL SULFATE 90 UG/1
AEROSOL, METERED RESPIRATORY (INHALATION)
Qty: 3 INHALER | Refills: 3 | Status: SHIPPED | OUTPATIENT
Start: 2018-01-27 | End: 2018-08-15

## 2018-01-27 RX ORDER — HYDROCODONE BITARTRATE AND ACETAMINOPHEN 10; 325 MG/1; MG/1
1 TABLET ORAL EVERY 6 HOURS PRN
Qty: 90 TABLET | Refills: 0 | Status: SHIPPED | OUTPATIENT
Start: 2018-01-27 | End: 2018-02-26 | Stop reason: WASHOUT

## 2018-01-27 NOTE — PROGRESS NOTES
HPI:   Dinorah Otto is a 44year old female who presents for a Medicare Subsequent Annual Wellness visit (Pt already had Initial Annual Wellness). C/o URI symptoms x 2 days, sinus pressure, hoarseness and postnasal drip.   Occasional wheeze (Due to back problems)   She has problems with Daily Activities based on screening of functional status.    Problems with daily activities? : Yes (Due to back problems)          Depression Screening (PHQ-2/PHQ-9): Over the LAST 2 WEEKS   Little interest or Epic.           She has never smoked tobacco.    CAGE Alcohol screening   Babar Santamaria was screened for Alcohol abuse and had a score of 0 so is at low risk.     Patient Care Team: Patient Care Team:  Navin San MD as PCP - General Ethan Herbert MEDICATIONS:     Outpatient Prescriptions Marked as Taking for the 1/27/18 encounter (Office Visit) with Joy Mishra PA-C:  HYDRALAZINE HCL 25 MG Oral Tab TAKE 1 TABLET(25 MG) BY MOUTH THREE TIMES DAILY   CHLORTHALIDONE 25 MG Oral Tab TAKE 1/2 TABL (1999); other surgical history;  (, ); tubal ligation; and other (16).     Her family history includes Cancer in her maternal grandfather; Diabetes (age of onset: 36) in her father; Diabetes (age of onset: 39) in her mother; Heart [place] Eyes:  PERRL, conjunctiva/corneas clear, EOM's intact both eyes   Ears:  Normal TM's and external ear canals, both ears   Nose: Nares normal, septum midline,mucosa inflamed, clear rhinorrhea with bl sinus tenderness   Throat: Lips, mucosa, and tongue nor year old female who presents for a Medicare Assessment.      PLAN SUMMARY:   Diagnoses and all orders for this visit:    Encounter for annual health examination    Depression screening  -     DEPRESSION SCREEN ANNUAL         Diet assessment: good     PLAN: quick review of chart, separate sheet to patient  [address] Internal Lab or Procedure External Lab or Procedure   Diabetes Screening      HbgA1C   Annually   Lab Results  Component Value Date   A1C 5.6 12/02/2017    No get every year    Pneumococcal 13 (Prevnar)  Covered Once after 65 No vaccine history found Please get once after your 65th birthday    Pneumococcal 23 (Pneumovax)  Covered Once after 65 No vaccine history found Please get once after your 65th birthday

## 2018-01-27 NOTE — PATIENT INSTRUCTIONS
Continue current medications  Take antibiotic as directed until completely finished. Contact us if you experience any adverse reaction to the medication.    Monitor blood pressure daily    Dandy Snyder's SCREENING SCHEDULE   Tests on this list service except at the [facility] Visit    Abdominal aortic aneurysm screening (once between ages 73-68) IPPE only No results found for this or any previous visit.  Limited to patients who meet one of the following criteria:   • Men who are 65-75 ye flowsheet data found.     Screening Mammogram      Mammogram    Recommend Annually to at least age 76, and as needed after 76 Mammogram,1 Yr due on 10/07/2018 Please get this Mammogram regularly   Immunizations      Influenza  Covered Annually   Orders plac definitions of the different types of Advance Directives. It also has the State forms available on it's website for anyone to review and print using their home computer and printer. (the forms are also available in [address])  www. putitinwriting. org  This li covered if needed at [facility], and non-screening if indicated for medical reasons Electrocardiogram date04/14/2016 Routine EKG is not a screening covered service except at the Welcome to Medicare Visit    Abdominal aortic aneurysm screening (onc Risk   Pap Smear,5 Years due on 05/09/2021     Chlamydia  Annually if high risk CHLAMYDIA, NUCLEIC ACID AMP (no units)   Date Value   08/12/2014 Negative    No flowsheet data found.     Screening Mammogram      Mammogram    Recommend Annually to at least ag website. http://[email or web address] idph.state. [place]/public/books/advin.htm  A link to the [employer]. This site has a lot of good information including definitions of the different types of Advance Directives.  It also has the [facility] forms available

## 2018-01-31 ENCOUNTER — APPOINTMENT (OUTPATIENT)
Dept: PHYSICAL THERAPY | Age: 43
End: 2018-01-31
Attending: INTERNAL MEDICINE
Payer: MEDICARE

## 2018-02-07 ENCOUNTER — TELEPHONE (OUTPATIENT)
Dept: SURGERY | Facility: CLINIC | Age: 43
End: 2018-02-07

## 2018-02-07 ENCOUNTER — OFFICE VISIT (OUTPATIENT)
Dept: SURGERY | Facility: CLINIC | Age: 43
End: 2018-02-07

## 2018-02-07 VITALS
SYSTOLIC BLOOD PRESSURE: 120 MMHG | DIASTOLIC BLOOD PRESSURE: 72 MMHG | WEIGHT: 253 LBS | BODY MASS INDEX: 43 KG/M2 | HEART RATE: 78 BPM

## 2018-02-07 DIAGNOSIS — M47.817 SPONDYLOSIS OF LUMBOSACRAL REGION, UNSPECIFIED SPINAL OSTEOARTHRITIS COMPLICATION STATUS: ICD-10-CM

## 2018-02-07 DIAGNOSIS — M47.816 LUMBAR FACET ARTHROPATHY: ICD-10-CM

## 2018-02-07 DIAGNOSIS — M46.1 BILATERAL SACROILIITIS (HCC): Primary | ICD-10-CM

## 2018-02-07 DIAGNOSIS — M46.1 BILATERAL SACROILIITIS (HCC): ICD-10-CM

## 2018-02-07 DIAGNOSIS — M47.816 LUMBAR FACET ARTHROPATHY: Primary | ICD-10-CM

## 2018-02-07 PROCEDURE — 99214 OFFICE O/P EST MOD 30 MIN: CPT | Performed by: NURSE PRACTITIONER

## 2018-02-07 RX ORDER — NAPROXEN 500 MG/1
500 TABLET ORAL EVERY 12 HOURS
Qty: 60 TABLET | Refills: 0 | Status: SHIPPED | OUTPATIENT
Start: 2018-02-07 | End: 2018-03-03

## 2018-02-07 RX ORDER — CYCLOBENZAPRINE HCL 10 MG
10 TABLET ORAL EVERY 8 HOURS PRN
Qty: 60 TABLET | Refills: 0 | Status: SHIPPED | OUTPATIENT
Start: 2018-02-07 | End: 2018-03-09

## 2018-02-07 NOTE — PATIENT INSTRUCTIONS
Refill policies:    • Allow 2-3 business days for refills; controlled substances may take longer.   • Contact your pharmacy at least 5 days prior to running out of medication and have them send an electronic request or submit request through the [facility] recommended that you have a procedure or additional testing performed. [facility]) will contact your insurance carrier to obtain pre-certification or prior authorization.     Unfortunately, [facility] has seen an increase in denial of paym Medications and Referral (if applicable) to your appointment. Check in at [facility] ([address] [facility] [address]. [place] [address]., Nancy Rome) outpatient registration in the [facility].   • Please note-No prescriptions will be written by Pain Clinic in OR on the day o procedure(s). Cancellation/Rescheduling Appointment:   In the event you need to cancel or reschedule your appointment, you must notify the office 24 hours prior.     Post-procedure instructions:        Please schedule a follow up visit within 6 to 10 wee clear that there is pain from the other areas as well. How is radiofrequency ablation actually performed? The skin on the back or neck is cleaned with antiseptic solution and then the procedure is carried out.  The skin is numbed with a local anesthet Will I be \"put out\" for a radiofrequency ablation? No. This procedure is done under local anesthesia. Most of the patients also receive intravenous sedation, which makes the procedure easier to tolerate.  It is necessary for you to be awake enough to c results than those who responded less well from diagnostic or trial injections. What are the risks and side effects of radiofrequency ablation? Generally speaking, this procedure is safe.  However, with any procedure there are risks, side effects and the

## 2018-02-07 NOTE — PROGRESS NOTES
Name: Claire Vela   : 1975   DOS: 2018     Pain Clinic Follow Up Visit:   Claire Vela is a 44year old female who presents for recheck of her chronic low back pain which is currently in the lumbar and sacral areas bi (500 mg total) by mouth Q12H.  Take with a meal Disp: 60 tablet Rfl: 0   Albuterol Sulfate HFA (VENTOLIN HFA) 108 (90 Base) MCG/ACT Inhalation Aero Soln INHALE 2 PUFFS BY MOUTH EVERY 4 HOURS AS NEEDED FOR WHEEZING OR SHORTNESS OF BREATH Disp: 3 Inhaler Rfl: flexion and extension. Pain to palpation over lumbar facet joints and sacroiliac joints bilaterally. Seated straight leg raises are negative bilaterally today.    Extremities:  Mark's test performed from a seated position is positive for low back pain bi of the risk for bleeding or ulcers related to this medication.   Medications filled today:  Signed Prescriptions Disp Refills    Cyclobenzaprine HCl 10 MG Oral Tab 60 tablet 0      Sig: Take 1 tablet (10 mg total) by mouth every 8 (eight) hours as needed fo

## 2018-02-08 ENCOUNTER — PATIENT OUTREACH (OUTPATIENT)
Dept: CASE MANAGEMENT | Age: 43
End: 2018-02-08

## 2018-02-08 DIAGNOSIS — G89.29 CHRONIC BILATERAL LOW BACK PAIN WITHOUT SCIATICA: ICD-10-CM

## 2018-02-08 DIAGNOSIS — E78.2 MIXED HYPERLIPIDEMIA: ICD-10-CM

## 2018-02-08 DIAGNOSIS — J45.50 UNCOMPLICATED SEVERE PERSISTENT ASTHMA: ICD-10-CM

## 2018-02-08 DIAGNOSIS — I10 ESSENTIAL HYPERTENSION: ICD-10-CM

## 2018-02-08 DIAGNOSIS — M54.50 CHRONIC BILATERAL LOW BACK PAIN WITHOUT SCIATICA: ICD-10-CM

## 2018-02-08 PROCEDURE — 99490 CHRNC CARE MGMT STAFF 1ST 20: CPT

## 2018-02-08 NOTE — PROGRESS NOTES
2/8/2018  Spoke to Madelyn for CCM. Updates to patient care team/comments: No updates to care team.  Patient reported changes in medications: Pt prescribed naproxen and cyclobenzaprine yesterday by BYRON Muller.  Meds on current med list.    Med A starch vegetables like carrots and eat more green vegetables           - How Often: 3 meals and snacks           - Where: home    • Patient Reported Barriers And Concerns: pt feels she does generally avoid carbs and sweets and admit to eating pastas and br

## 2018-02-12 ENCOUNTER — APPOINTMENT (OUTPATIENT)
Dept: PHYSICAL THERAPY | Age: 43
End: 2018-02-12
Attending: INTERNAL MEDICINE
Payer: MEDICARE

## 2018-02-12 ENCOUNTER — TELEPHONE (OUTPATIENT)
Dept: PHYSICAL THERAPY | Age: 43
End: 2018-02-12

## 2018-02-26 ENCOUNTER — OFFICE VISIT (OUTPATIENT)
Dept: PHYSICAL THERAPY | Age: 43
End: 2018-02-26
Attending: INTERNAL MEDICINE
Payer: MEDICARE

## 2018-02-26 PROCEDURE — 97535 SELF CARE MNGMENT TRAINING: CPT

## 2018-02-26 PROCEDURE — 97035 APP MDLTY 1+ULTRASOUND EA 15: CPT

## 2018-02-26 PROCEDURE — 97112 NEUROMUSCULAR REEDUCATION: CPT

## 2018-02-26 PROCEDURE — 97140 MANUAL THERAPY 1/> REGIONS: CPT

## 2018-02-26 NOTE — PROGRESS NOTES
Dx: Abdominal adhesions (K66.0)             Authorized # of Visits:  4/12         Next MD visit: none scheduled  Fall Risk: standard         Precautions: n/a           \"Дмитрий\"  Subjective: Patient had compression stocking refitted today.   Patient needed Upon Orthopedic evaluation, patient has significant scar tissue adhesion especially at inverse T from wet to dry dressing required for poor  healing.   Patient also has core weakness with 2/5 Transverse Abdominis strength and pain with palpation of Plan: Patient will be seen for 1-2 x/week or a total of 12 visits over a 90 day period.  Treatment will include: Treatment will include: Neuromuscular re-education, including use of biofeedback to aid in pelvic floor muscle retraining (down training, up tr Neuromuscular reeducation: Coordination of Diaphragmatic breathing and PFM. Self Care:  Bowel/ Bladder/ Diet log review, Belly Hard Belly big, MFR with ball scar, Diet modification  US: 1.2 W/ CM2 to horizontal/ vertical scar, 10'  Manual: Scar tissue re Yes, constipation can be caused by medications you are taking for other conditions.  Common medications include; pain medicines, antidepressants, psychiatric medications, high blood pressure medication, diuretics, iron supplements, calcium supplements, blunt Your body has a natural emptying reflex. Approximately ½ hour after eating a meal or drinking a hot beverage, a reflex occurs to increase motility or movement of the stool down to the rectum.  This reflex usually occurs in the mornings when trying to get yo

## 2018-02-28 ENCOUNTER — TELEPHONE (OUTPATIENT)
Dept: SURGERY | Facility: CLINIC | Age: 43
End: 2018-02-28

## 2018-02-28 NOTE — TELEPHONE ENCOUNTER
Called patient to review the following preoperative instructions. Reminded to hold naproxen and ibuprofen 24 hours prior to procedure. Verbalized understanding, all questions answered.        [address]  PRE-PROCEDURE INSTRUCTIONS WITH IV S ? If you have an implanted Spinal Cord or Peripheral Nerve Stimulator: Please remember to turn device off for procedure      Medication:   Number of days you need to be off for the following medications:  ? Aggrenox 10 days   ?  Agrylin (Anagrelide) 10 days If you are a diabetic, please increase the frequency of your glucose monitoring after the procedure as this may cause a temporary increase in your blood sugar.   Contact your primary care physician if your blood sugar rises as you may require some medicatio

## 2018-03-05 ENCOUNTER — OFFICE VISIT (OUTPATIENT)
Dept: PHYSICAL THERAPY | Age: 43
End: 2018-03-05
Attending: INTERNAL MEDICINE
Payer: MEDICARE

## 2018-03-05 PROCEDURE — 97140 MANUAL THERAPY 1/> REGIONS: CPT

## 2018-03-05 PROCEDURE — 97112 NEUROMUSCULAR REEDUCATION: CPT

## 2018-03-05 PROCEDURE — 97535 SELF CARE MNGMENT TRAINING: CPT

## 2018-03-05 PROCEDURE — 97035 APP MDLTY 1+ULTRASOUND EA 15: CPT

## 2018-03-05 RX ORDER — NAPROXEN 500 MG/1
TABLET ORAL
Qty: 60 TABLET | Refills: 0 | Status: SHIPPED | OUTPATIENT
Start: 2018-03-05 | End: 2018-08-31

## 2018-03-05 NOTE — PROGRESS NOTES
Dx: Abdominal adhesions (K66.0)             Authorized # of Visits:  5/12         Next MD visit: none scheduled  Fall Risk: standard         Precautions: n/a           \"Дмитрий\"  Subjective:   Dr. Radha Rosario is performing [address] [place] [address] Goals:   1) Pt will improve pelvic floor/ lower abdominal  muscle recruitment to 3/5 strength to be able to perform ADLs, including without urinary leakage and urgency  (12 visits)  2) Patient will able to perform effective pelvic floor contraction (and \" Horizontal/ Vertical scar US 1.2 w/cm2, 100%, 12'  Horizontal/ Vertical scar US 1.2 w/cm2, 100%, 10'  Horizontal/ Vertical scar US 1.2 w/cm2, 100%, 15'  Horizontal/ Vertical scar      Scar tissue release Scar tissue release CTR/ MFR Scar tissue release CTR Any persistent change in bowel habit, such as an increase or decrease in frequency or size of stool, blood in stool, or an increased difficulty in evacuating, warrants a medical consultation. WHAT ARE NORMAL BOWEL HABITS?   For most people, it is normal Most people in [address] [place] [address] need more fiber in their diet. Fiber supplements or other bulking agents sold at drug stores are available.  Fiber supplements take several weeks, possibly months, to reach full effectiveness, but they are not habit forming or This recipe is commonly suggested to promote regular bowel function by increasing dietary fiber. You may experience a bloated feeling and have gas when adding fiber to your diet but this should pass within a few weeks.  This may be eased by adding fiber sl

## 2018-03-06 ENCOUNTER — HOSPITAL ENCOUNTER (OUTPATIENT)
Facility: HOSPITAL | Age: 43
Setting detail: HOSPITAL OUTPATIENT SURGERY
Discharge: HOME | End: 2018-03-06
Attending: ANESTHESIOLOGY | Admitting: ANESTHESIOLOGY
Payer: MEDICARE

## 2018-03-06 ENCOUNTER — SURGERY (OUTPATIENT)
Age: 43
End: 2018-03-06

## 2018-03-06 ENCOUNTER — APPOINTMENT (OUTPATIENT)
Dept: GENERAL RADIOLOGY | Facility: HOSPITAL | Age: 43
End: 2018-03-06
Attending: ANESTHESIOLOGY
Payer: MEDICARE

## 2018-03-06 VITALS
HEART RATE: 82 BPM | RESPIRATION RATE: 18 BRPM | TEMPERATURE: 98 F | DIASTOLIC BLOOD PRESSURE: 96 MMHG | SYSTOLIC BLOOD PRESSURE: 154 MMHG | OXYGEN SATURATION: 95 %

## 2018-03-06 DIAGNOSIS — M47.817 SPONDYLOSIS OF LUMBOSACRAL REGION, UNSPECIFIED SPINAL OSTEOARTHRITIS COMPLICATION STATUS: ICD-10-CM

## 2018-03-06 DIAGNOSIS — M46.1 BILATERAL SACROILIITIS (HCC): ICD-10-CM

## 2018-03-06 LAB
POCT LOT NUMBER: NORMAL
POCT URINE PREGNANCY: NEGATIVE

## 2018-03-06 PROCEDURE — BR1D1ZZ FLUOROSCOPY OF SACROILIAC JOINTS USING LOW OSMOLAR CONTRAST: ICD-10-PCS | Performed by: ANESTHESIOLOGY

## 2018-03-06 PROCEDURE — 3E0T3TZ INTRODUCTION OF DESTRUCTIVE AGENT INTO PERIPHERAL NERVES AND PLEXI, PERCUTANEOUS APPROACH: ICD-10-PCS | Performed by: ANESTHESIOLOGY

## 2018-03-06 PROCEDURE — 76000 FLUOROSCOPY <1 HR PHYS/QHP: CPT | Performed by: ANESTHESIOLOGY

## 2018-03-06 PROCEDURE — 81025 URINE PREGNANCY TEST: CPT | Performed by: ANESTHESIOLOGY

## 2018-03-06 PROCEDURE — 99152 MOD SED SAME PHYS/QHP 5/>YRS: CPT | Performed by: ANESTHESIOLOGY

## 2018-03-06 RX ORDER — ONDANSETRON 2 MG/ML
4 INJECTION INTRAMUSCULAR; INTRAVENOUS ONCE AS NEEDED
Status: CANCELLED | OUTPATIENT
Start: 2018-03-06 | End: 2018-03-06

## 2018-03-06 RX ORDER — SODIUM CHLORIDE, SODIUM LACTATE, POTASSIUM CHLORIDE, CALCIUM CHLORIDE 600; 310; 30; 20 MG/100ML; MG/100ML; MG/100ML; MG/100ML
100 INJECTION, SOLUTION INTRAVENOUS CONTINUOUS
Status: DISCONTINUED | OUTPATIENT
Start: 2018-03-06 | End: 2018-03-06

## 2018-03-06 RX ORDER — DIPHENHYDRAMINE HYDROCHLORIDE 50 MG/ML
50 INJECTION INTRAMUSCULAR; INTRAVENOUS ONCE AS NEEDED
Status: CANCELLED | OUTPATIENT
Start: 2018-03-06 | End: 2018-03-06

## 2018-03-06 RX ORDER — LIDOCAINE HYDROCHLORIDE 10 MG/ML
INJECTION, SOLUTION EPIDURAL; INFILTRATION; INTRACAUDAL; PERINEURAL AS NEEDED
Status: DISCONTINUED | OUTPATIENT
Start: 2018-03-06 | End: 2018-03-06 | Stop reason: HOSPADM

## 2018-03-06 RX ORDER — MIDAZOLAM HYDROCHLORIDE 1 MG/ML
INJECTION INTRAMUSCULAR; INTRAVENOUS AS NEEDED
Status: DISCONTINUED | OUTPATIENT
Start: 2018-03-06 | End: 2018-03-06 | Stop reason: HOSPADM

## 2018-03-06 RX ORDER — METHYLPREDNISOLONE ACETATE 40 MG/ML
INJECTION, SUSPENSION INTRA-ARTICULAR; INTRALESIONAL; INTRAMUSCULAR; SOFT TISSUE AS NEEDED
Status: DISCONTINUED | OUTPATIENT
Start: 2018-03-06 | End: 2018-03-06 | Stop reason: HOSPADM

## 2018-03-06 NOTE — OPERATIVE REPORT
[facility]  Operative Report  3/6/2018     Da Grippe Patient Status:  Hospital Outpatient Surgery    1975 MRN [id number]   Location [address] [place] [address] Attending No att. providers found   Hosp Day # 0 prepped and draped in sterile fashion. Subcutaneous lidocaine was instilled into the superficial soft tissues for local anesthesia.  Under fluoroscopic guidance, a 22-gauge, 100-mm SMK needle was advanced into the inferior pole of the right sacroiliac joint

## 2018-03-07 ENCOUNTER — PATIENT OUTREACH (OUTPATIENT)
Dept: CASE MANAGEMENT | Age: 43
End: 2018-03-07

## 2018-03-07 DIAGNOSIS — I10 ESSENTIAL HYPERTENSION: ICD-10-CM

## 2018-03-07 DIAGNOSIS — E78.2 MIXED HYPERLIPIDEMIA: ICD-10-CM

## 2018-03-07 DIAGNOSIS — J45.50 UNCOMPLICATED SEVERE PERSISTENT ASTHMA: ICD-10-CM

## 2018-03-07 PROCEDURE — 99490 CHRNC CARE MGMT STAFF 1ST 20: CPT

## 2018-03-12 ENCOUNTER — TELEPHONE (OUTPATIENT)
Dept: PHYSICAL THERAPY | Age: 43
End: 2018-03-12

## 2018-03-12 ENCOUNTER — APPOINTMENT (OUTPATIENT)
Dept: PHYSICAL THERAPY | Age: 43
End: 2018-03-12
Attending: INTERNAL MEDICINE
Payer: MEDICARE

## 2018-03-12 ENCOUNTER — TELEPHONE (OUTPATIENT)
Dept: NEUROLOGY | Facility: CLINIC | Age: 43
End: 2018-03-12

## 2018-03-12 NOTE — TELEPHONE ENCOUNTER
Spoke w/ pt and reviewed instructions, procedure date 3/13/18 and arrival time 8:30am.  Pt verbalized understanding and appreciation and agreeable to holding Naproxen/NSAIDs 24hrs. Denies s/s of infection/recent abx use. No further needs.     [facility] Xarelto (Rivaroxaban) 3 days  ? Lovenox (Enoxaparin) 24 hours  ? Aspirin  ? 81mg 24 hours  ? Greater than 81 mg (325mg) 7 days  ? Coumadin Procedure may be cancelled if INR is elevated. ? Epidural ____ - 7 days  ? Others 5 days  ?  Excedrin (with aspiri It is normal to have increased pain at injection site for up to 3-5 days after procedure, you can use heat for the first 24 hours (20 minutes on 20 minutes off) after the first 24 hours you can use heat or cold.

## 2018-03-13 ENCOUNTER — APPOINTMENT (OUTPATIENT)
Dept: GENERAL RADIOLOGY | Facility: HOSPITAL | Age: 43
End: 2018-03-13
Attending: ANESTHESIOLOGY
Payer: MEDICARE

## 2018-03-13 ENCOUNTER — SURGERY (OUTPATIENT)
Age: 43
End: 2018-03-13

## 2018-03-13 ENCOUNTER — HOSPITAL ENCOUNTER (OUTPATIENT)
Facility: HOSPITAL | Age: 43
Setting detail: HOSPITAL OUTPATIENT SURGERY
Discharge: HOME | End: 2018-03-13
Attending: ANESTHESIOLOGY | Admitting: ANESTHESIOLOGY
Payer: MEDICARE

## 2018-03-13 VITALS
OXYGEN SATURATION: 92 % | TEMPERATURE: 98 F | SYSTOLIC BLOOD PRESSURE: 146 MMHG | HEART RATE: 74 BPM | DIASTOLIC BLOOD PRESSURE: 84 MMHG | RESPIRATION RATE: 18 BRPM

## 2018-03-13 DIAGNOSIS — M47.817 SPONDYLOSIS OF LUMBOSACRAL REGION, UNSPECIFIED SPINAL OSTEOARTHRITIS COMPLICATION STATUS: ICD-10-CM

## 2018-03-13 DIAGNOSIS — M47.816 LUMBAR FACET ARTHROPATHY: ICD-10-CM

## 2018-03-13 PROCEDURE — 81025 URINE PREGNANCY TEST: CPT | Performed by: ANESTHESIOLOGY

## 2018-03-13 PROCEDURE — 76000 FLUOROSCOPY <1 HR PHYS/QHP: CPT | Performed by: ANESTHESIOLOGY

## 2018-03-13 PROCEDURE — 99152 MOD SED SAME PHYS/QHP 5/>YRS: CPT | Performed by: ANESTHESIOLOGY

## 2018-03-13 PROCEDURE — 3E0T3TZ INTRODUCTION OF DESTRUCTIVE AGENT INTO PERIPHERAL NERVES AND PLEXI, PERCUTANEOUS APPROACH: ICD-10-PCS | Performed by: ANESTHESIOLOGY

## 2018-03-13 RX ORDER — ONDANSETRON 2 MG/ML
4 INJECTION INTRAMUSCULAR; INTRAVENOUS ONCE AS NEEDED
Status: CANCELLED | OUTPATIENT
Start: 2018-03-13 | End: 2018-03-13

## 2018-03-13 RX ORDER — SODIUM CHLORIDE, SODIUM LACTATE, POTASSIUM CHLORIDE, CALCIUM CHLORIDE 600; 310; 30; 20 MG/100ML; MG/100ML; MG/100ML; MG/100ML
100 INJECTION, SOLUTION INTRAVENOUS CONTINUOUS
Status: DISCONTINUED | OUTPATIENT
Start: 2018-03-13 | End: 2018-03-13

## 2018-03-13 RX ORDER — LIDOCAINE HYDROCHLORIDE 10 MG/ML
INJECTION, SOLUTION EPIDURAL; INFILTRATION; INTRACAUDAL; PERINEURAL AS NEEDED
Status: DISCONTINUED | OUTPATIENT
Start: 2018-03-13 | End: 2018-03-13 | Stop reason: HOSPADM

## 2018-03-13 RX ORDER — METHYLPREDNISOLONE ACETATE 40 MG/ML
INJECTION, SUSPENSION INTRA-ARTICULAR; INTRALESIONAL; INTRAMUSCULAR; SOFT TISSUE AS NEEDED
Status: DISCONTINUED | OUTPATIENT
Start: 2018-03-13 | End: 2018-03-13 | Stop reason: HOSPADM

## 2018-03-13 RX ORDER — DIPHENHYDRAMINE HYDROCHLORIDE 50 MG/ML
50 INJECTION INTRAMUSCULAR; INTRAVENOUS ONCE AS NEEDED
Status: CANCELLED | OUTPATIENT
Start: 2018-03-13 | End: 2018-03-13

## 2018-03-13 RX ORDER — ONDANSETRON 2 MG/ML
INJECTION INTRAMUSCULAR; INTRAVENOUS AS NEEDED
Status: DISCONTINUED | OUTPATIENT
Start: 2018-03-13 | End: 2018-03-13 | Stop reason: HOSPADM

## 2018-03-13 RX ORDER — MIDAZOLAM HYDROCHLORIDE 1 MG/ML
INJECTION INTRAMUSCULAR; INTRAVENOUS AS NEEDED
Status: DISCONTINUED | OUTPATIENT
Start: 2018-03-13 | End: 2018-03-13 | Stop reason: HOSPADM

## 2018-03-13 NOTE — H&P
History & Physical Examination    Patient Name: Alison Flores  MRN: [id number]  [facility]: [id number]  YOB: 1975    Pre-Operative Diagnosis:  Lumbar facet arth[facility] ([facility] [M46.96]  Spondylosis of lumbosacral region, unspecified spinal 3/5/2018 at Unknown time   SPIRIVA RESPIMAT 1.25 MCG/ACT Inhalation Aero Soln INL 2 PFS PO QD Disp:  Rfl: 0 3/6/2018 at Unknown time   Ibuprofen 200 MG Oral Cap Take by mouth as needed.  Disp:  Rfl:  Past Month at Unknown time   Montelukast Sodium (ORTEGAI Father    • Cancer Maternal Grandfather      brain; 6/9 of his siblings had brain cancer   • Prostate Cancer Maternal Grandfather    • Prostate Cancer Maternal Uncle    • Ovarian Cancer Maternal Grandmother 60     EST        Smoking status: Never Smoker

## 2018-03-13 NOTE — OPERATIVE REPORT
[facility]  Operative Report  3/13/2018     Anne Sanchezilana Patient Status:  Hospital Outpatient Surgery    1975 MRN [id number]   Location [address] [facility] Attending No att. providers found   [facility] Day # approximately 2 mL of 1% lidocaine for local anesthesia.   Under fluoroscopic guidance, a 22-gauge, 100-mm SMK needle was advanced to the junction of the superior aspect of the right L3 transverse process and the lateral aspect of the same level superior ar

## 2018-03-19 ENCOUNTER — OFFICE VISIT (OUTPATIENT)
Dept: PHYSICAL THERAPY | Age: 43
End: 2018-03-19
Attending: INTERNAL MEDICINE
Payer: MEDICARE

## 2018-03-19 PROCEDURE — 97035 APP MDLTY 1+ULTRASOUND EA 15: CPT

## 2018-03-19 PROCEDURE — 97530 THERAPEUTIC ACTIVITIES: CPT

## 2018-03-19 PROCEDURE — 97140 MANUAL THERAPY 1/> REGIONS: CPT

## 2018-03-19 PROCEDURE — 97112 NEUROMUSCULAR REEDUCATION: CPT

## 2018-03-19 NOTE — PROGRESS NOTES
Dx: Abdominal adhesions (K66.0)             Authorized # of Visits:  6/12         Next MD visit: none scheduled  Fall Risk: standard         Precautions: n/a           \"Дмитрий\"  Subjective:   Dr. Radha Rosario is performing [address] [place] [address] Patient also reports pain at Cecum and Sigmoid area with extensive scar tissue adhesions from malrotation surgery. Instructed patient on self scar tissue MFR and Reverse colon massage to increase mobility daily.       Patient progress to Pelvic muscle brac Plan: Patient will be seen for 1-2 x/week or a total of 12 visits over a 90 day period.  Treatment will include: Treatment will include: Neuromuscular re-education, including use of biofeedback to aid in pelvic floor muscle retraining (down training, up tr Coordination of DB and PFM Coordination of DB and PFM Coordination of DB and PFM 10 count 5' cycle Pelvic brace 10 count  with ball Adduction  And blue t-band  5'x2 Pelvic brace 10 count  with ball Adduction 5'        Constipation ed with rec of Fiber reci Constipation may be the result of several, possibly simultaneous factors including:   ? Limited fluid and fiber intake  ? Imbalances in the diet (too much sugar and animal fat)  ? Sedentary lifestyle  ? Repeatedly ignoring the urge to have a BM  ?  Slow mov You should discuss your fiber needs with your physician, pharmacist or nutritionist.  Typical dietary recommendations for fiber are between 25-35 grams per day. Most Americans consume only 10-15 grams per day.   When adding fiber to your diet it is importan This may be stored in your refrigerator or your freezer. One to two tablespoon servings may be frozen in sectioned ice cube trays or in foam plastic egg cartons and thawed as needed.    * 1 tablespoon is approximately 2 grams of fiber with wheat bran & 1 gr

## 2018-03-26 ENCOUNTER — APPOINTMENT (OUTPATIENT)
Dept: PHYSICAL THERAPY | Age: 43
End: 2018-03-26
Attending: INTERNAL MEDICINE
Payer: MEDICARE

## 2018-03-29 ENCOUNTER — TELEPHONE (OUTPATIENT)
Dept: SURGERY | Facility: CLINIC | Age: 43
End: 2018-03-29

## 2018-03-29 NOTE — TELEPHONE ENCOUNTER
Spoke to patient/Left message for patient, confirmed procedure date of 4/3/18 and to be checked in at outpatient registration at 8:30 am.Patient instructed to call pre-procedure line before procedure at [phone].  Patient instructed to call office if th

## 2018-04-03 ENCOUNTER — HOSPITAL ENCOUNTER (OUTPATIENT)
Facility: HOSPITAL | Age: 43
Setting detail: HOSPITAL OUTPATIENT SURGERY
Discharge: HOME | End: 2018-04-03
Attending: ANESTHESIOLOGY | Admitting: ANESTHESIOLOGY
Payer: MEDICARE

## 2018-04-03 ENCOUNTER — TELEPHONE (OUTPATIENT)
Dept: SURGERY | Facility: CLINIC | Age: 43
End: 2018-04-03

## 2018-04-03 ENCOUNTER — APPOINTMENT (OUTPATIENT)
Dept: GENERAL RADIOLOGY | Facility: HOSPITAL | Age: 43
End: 2018-04-03
Attending: ANESTHESIOLOGY
Payer: MEDICARE

## 2018-04-03 ENCOUNTER — SURGERY (OUTPATIENT)
Age: 43
End: 2018-04-03

## 2018-04-03 DIAGNOSIS — M46.1 BILATERAL SACROILIITIS (HCC): ICD-10-CM

## 2018-04-03 DIAGNOSIS — M47.819 SPONDYLOSIS WITHOUT MYELOPATHY: ICD-10-CM

## 2018-04-03 DIAGNOSIS — M47.817 SPONDYLOSIS OF LUMBOSACRAL REGION, UNSPECIFIED SPINAL OSTEOARTHRITIS COMPLICATION STATUS: ICD-10-CM

## 2018-04-03 DIAGNOSIS — M46.1 SACROILIITIS (HCC): Primary | ICD-10-CM

## 2018-04-03 RX ORDER — SODIUM CHLORIDE, SODIUM LACTATE, POTASSIUM CHLORIDE, CALCIUM CHLORIDE 600; 310; 30; 20 MG/100ML; MG/100ML; MG/100ML; MG/100ML
100 INJECTION, SOLUTION INTRAVENOUS CONTINUOUS
Status: DISCONTINUED | OUTPATIENT
Start: 2018-04-03 | End: 2018-04-03

## 2018-04-03 NOTE — TELEPHONE ENCOUNTER
Spoke with patient who and rescheduled RFA procedure from 4-3-18 & 4-12-18 to 5-8-18 arriving at 8:45 and 5-15-18 arriving at 12:00. Pt chose to reschedule both procedures as she does not think her bruising will be healed by next week.  Reviewed pre-op inst ? Please note-No prescriptions will be written by Pain Clinic in OR on the day of procedure. If you require a refill of medications, please contact the office 48 hours prior to your procedure.   ? If you have an implanted Spinal Cord or Peripheral Nerve Sti Please schedule a follow up visit within 6 to 10 weeks after your last procedure date   Please call our office with any questions or concerns before or after your procedure at [phone].   If you are a diabetic, please increase the frequency of you

## 2018-04-03 NOTE — H&P
History & Physical Examination    Patient Name: Kylie Fernandez  MRN: [id number]  CSN: [id number]  YOB: 1975    Pre-Operative Diagnosis:  Bilateral [facility]ili[facility] ([facility] [M46.1]  Spondylosis of lumbosacral region, unspecified spinal ost Bromide (INCRUSE ELLIPTA) 62.5 MCG/INH Inhalation Aerosol Powder, Breath Activated Inhale 1 puff into the lungs daily.  Disp: 1 each Rfl: 0 3/5/2018 at Unknown time   SPIRIVA RESPIMAT 1.25 MCG/ACT Inhalation Aero Soln INL 2 PFS PO QD Disp:  Rfl: 0 3/6/2018 Problem Relation Age of Onset   • Diabetes Mother 39   • Hypertension Mother    • Heart Disease Mother 54   • Diabetes Father 36   • Obesity Father    • Cancer Maternal Grandfather      brain; 6/9 of his siblings had brain cancer   • Prostate Cancer Mate

## 2018-04-03 NOTE — TELEPHONE ENCOUNTER
Procedure cancelled in pre-op d/t bruising near procedure site, case removed from OR's at a glance, remains in depot.

## 2018-04-03 NOTE — TELEPHONE ENCOUNTER
Patient seen in the pre-op area and she has a huge bruise to bilateral buttock. Dr. Denver San cancelled today's procedure due to possibility of infection and or delayed healing. Patient verbalized understanding.  Patient also advised to call the office if the br

## 2018-04-23 ENCOUNTER — OFFICE VISIT (OUTPATIENT)
Dept: PHYSICAL THERAPY | Age: 43
End: 2018-04-23
Attending: INTERNAL MEDICINE
Payer: MEDICARE

## 2018-04-23 PROCEDURE — 97140 MANUAL THERAPY 1/> REGIONS: CPT

## 2018-04-23 PROCEDURE — 97112 NEUROMUSCULAR REEDUCATION: CPT

## 2018-04-23 PROCEDURE — 97035 APP MDLTY 1+ULTRASOUND EA 15: CPT

## 2018-04-23 NOTE — PROGRESS NOTES
Dx: Abdominal adhesions (K66.0)             Authorized # of Visits:  7/12         Next MD visit: none scheduled  Fall Risk: standard         Precautions: n/a           \"Дмитрий\"  Subjective:   Dr. Denver San delayed [place] Upon Orthopedic evaluation, patient has significant scar tissue adhesion especially at inverse T from wet to dry dressing required for poor  healing.  Patient also has core weakness with 2/5 Transverse Abdominis strength and pain with palpation of Plan: Patient will be seen for 1-2 x/week or a total of 12 visits over a 90 day period.  Treatment will include: Treatment will include: Neuromuscular re-education, including use of biofeedback to aid in pelvic floor muscle retraining (down training, up tr Belly Hard Belly Big with Squatty Potty Defecation postures and Diet modification for [place] stool #4 Defecation postures and Diet modification for [place] stool #4       Coordination of DB and PFM Coordination of DB and PFM Coordination of DB and PFM 10 For most people, it is normal for bowel movements to occur from 3 times per day to 3 times per week. Some people can go for a week without experiencing discomfort or harmful effects.  Normal stools should be about the size, shape and consistency of a ripe b Most people in [address] [place] [address] need more fiber in their diet. Fiber supplements or other bulking agents sold at drug stores are available.  Fiber supplements take several weeks, possibly months, to reach full effectiveness, but they are not habit forming or This recipe is commonly suggested to promote regular bowel function by increasing dietary fiber. You may experience a bloated feeling and have gas when adding fiber to your diet but this should pass within a few weeks.  This may be eased by adding fiber sl

## 2018-04-24 ENCOUNTER — PATIENT OUTREACH (OUTPATIENT)
Dept: CASE MANAGEMENT | Age: 43
End: 2018-04-24

## 2018-04-24 NOTE — PROGRESS NOTES
LVM requesting a call back for ccm. Care plan: Reviewed. Pt to have asthma action plan completed and pulmonology appt per pt. No future PCP appts scheduled at this time.   Time spent: 3 min collecting, reviewing pt data, communication and documentation

## 2018-04-30 ENCOUNTER — APPOINTMENT (OUTPATIENT)
Dept: PHYSICAL THERAPY | Age: 43
End: 2018-04-30
Payer: MEDICARE

## 2018-04-30 ENCOUNTER — TELEPHONE (OUTPATIENT)
Dept: PHYSICAL THERAPY | Age: 43
End: 2018-04-30

## 2018-05-03 ENCOUNTER — TELEPHONE (OUTPATIENT)
Dept: SURGERY | Facility: CLINIC | Age: 43
End: 2018-05-03

## 2018-05-03 NOTE — TELEPHONE ENCOUNTER
Spoke to patient confirmed procedure date of 5/8/18 and to be checked in at outpatient registration at 845am.  Patient instructed to call pre-procedure line before procedure at [phone].  Patient instructed to call office if there are additional questio

## 2018-05-07 ENCOUNTER — TELEPHONE (OUTPATIENT)
Dept: PHYSICAL THERAPY | Age: 43
End: 2018-05-07

## 2018-05-07 DIAGNOSIS — I10 ESSENTIAL HYPERTENSION: ICD-10-CM

## 2018-05-08 ENCOUNTER — HOSPITAL ENCOUNTER (OUTPATIENT)
Facility: HOSPITAL | Age: 43
Setting detail: HOSPITAL OUTPATIENT SURGERY
Discharge: HOME | End: 2018-05-08
Attending: ANESTHESIOLOGY | Admitting: ANESTHESIOLOGY
Payer: MEDICARE

## 2018-05-08 ENCOUNTER — APPOINTMENT (OUTPATIENT)
Dept: GENERAL RADIOLOGY | Facility: HOSPITAL | Age: 43
End: 2018-05-08
Attending: ANESTHESIOLOGY
Payer: MEDICARE

## 2018-05-08 ENCOUNTER — SURGERY (OUTPATIENT)
Age: 43
End: 2018-05-08

## 2018-05-08 VITALS
RESPIRATION RATE: 20 BRPM | HEART RATE: 96 BPM | DIASTOLIC BLOOD PRESSURE: 88 MMHG | TEMPERATURE: 98 F | SYSTOLIC BLOOD PRESSURE: 127 MMHG | OXYGEN SATURATION: 96 %

## 2018-05-08 DIAGNOSIS — M46.1 SACROILIITIS (HCC): ICD-10-CM

## 2018-05-08 DIAGNOSIS — M47.819 SPONDYLOSIS WITHOUT MYELOPATHY: ICD-10-CM

## 2018-05-08 PROCEDURE — 99152 MOD SED SAME PHYS/QHP 5/>YRS: CPT | Performed by: ANESTHESIOLOGY

## 2018-05-08 PROCEDURE — 76000 FLUOROSCOPY <1 HR PHYS/QHP: CPT | Performed by: ANESTHESIOLOGY

## 2018-05-08 PROCEDURE — 81025 URINE PREGNANCY TEST: CPT | Performed by: ANESTHESIOLOGY

## 2018-05-08 PROCEDURE — 3E0T3TZ INTRODUCTION OF DESTRUCTIVE AGENT INTO PERIPHERAL NERVES AND PLEXI, PERCUTANEOUS APPROACH: ICD-10-PCS | Performed by: ANESTHESIOLOGY

## 2018-05-08 RX ORDER — DOXYCYCLINE HYCLATE 100 MG/1
CAPSULE ORAL
Qty: 20 CAPSULE | Refills: 0 | OUTPATIENT
Start: 2018-05-08

## 2018-05-08 RX ORDER — HYDROCODONE BITARTRATE AND ACETAMINOPHEN 5; 325 MG/1; MG/1
1 TABLET ORAL EVERY 4 HOURS PRN
Status: DISCONTINUED | OUTPATIENT
Start: 2018-05-08 | End: 2018-05-08

## 2018-05-08 RX ORDER — HYDRALAZINE HYDROCHLORIDE 25 MG/1
TABLET, FILM COATED ORAL
Qty: 90 TABLET | Refills: 1 | Status: SHIPPED | OUTPATIENT
Start: 2018-05-08 | End: 2018-08-22

## 2018-05-08 RX ORDER — MIDAZOLAM HYDROCHLORIDE 1 MG/ML
INJECTION INTRAMUSCULAR; INTRAVENOUS AS NEEDED
Status: DISCONTINUED | OUTPATIENT
Start: 2018-05-08 | End: 2018-05-08 | Stop reason: HOSPADM

## 2018-05-08 RX ORDER — DIPHENHYDRAMINE HYDROCHLORIDE 50 MG/ML
50 INJECTION INTRAMUSCULAR; INTRAVENOUS ONCE AS NEEDED
Status: DISCONTINUED | OUTPATIENT
Start: 2018-05-08 | End: 2018-05-08

## 2018-05-08 RX ORDER — METHYLPREDNISOLONE ACETATE 40 MG/ML
INJECTION, SUSPENSION INTRA-ARTICULAR; INTRALESIONAL; INTRAMUSCULAR; SOFT TISSUE AS NEEDED
Status: DISCONTINUED | OUTPATIENT
Start: 2018-05-08 | End: 2018-05-08 | Stop reason: HOSPADM

## 2018-05-08 RX ORDER — LIDOCAINE HYDROCHLORIDE 10 MG/ML
INJECTION, SOLUTION EPIDURAL; INFILTRATION; INTRACAUDAL; PERINEURAL AS NEEDED
Status: DISCONTINUED | OUTPATIENT
Start: 2018-05-08 | End: 2018-05-08 | Stop reason: HOSPADM

## 2018-05-08 RX ORDER — ACETAMINOPHEN 325 MG/1
650 TABLET ORAL EVERY 6 HOURS PRN
Status: DISCONTINUED | OUTPATIENT
Start: 2018-05-08 | End: 2018-05-08

## 2018-05-08 RX ORDER — SODIUM CHLORIDE, SODIUM LACTATE, POTASSIUM CHLORIDE, CALCIUM CHLORIDE 600; 310; 30; 20 MG/100ML; MG/100ML; MG/100ML; MG/100ML
100 INJECTION, SOLUTION INTRAVENOUS CONTINUOUS
Status: DISCONTINUED | OUTPATIENT
Start: 2018-05-08 | End: 2018-05-08

## 2018-05-08 RX ORDER — ONDANSETRON 2 MG/ML
4 INJECTION INTRAMUSCULAR; INTRAVENOUS ONCE AS NEEDED
Status: DISCONTINUED | OUTPATIENT
Start: 2018-05-08 | End: 2018-05-08

## 2018-05-08 NOTE — H&P
History & Physical Examination    Patient Name: Ramu Meredith  MRN: [id number]  CSN: [id number]  YOB: 1975    Pre-Operative Diagnosis:  [facility] [M46.1]  Spondylosis without myelopathy [M47.819]    Present Illness: A 44 y Aerosol Powder, Breath Activated Inhale 1 puff into the lungs daily.  Disp: 1 each Rfl: 0 3/5/2018 at Unknown time   SPIRIVA RESPIMAT 1.25 MCG/ACT Inhalation Aero Soln INL 2 PFS PO QD Disp:  Rfl: 0 3/6/2018 at Unknown time   Montelukast Sodium (SINGULAIR) 1 Father    • Cancer Maternal Grandfather      brain; 6/9 of his siblings had brain cancer   • Prostate Cancer Maternal Grandfather    • Prostate Cancer Maternal Uncle    • Ovarian Cancer Maternal Grandmother 60     EST        Smoking status: Never Smoker

## 2018-05-08 NOTE — OPERATIVE REPORT
[facility]  Operative Report  2018     Mitchshonda April Patient Status:  Hospital Outpatient Surgery    1975 MRN [id number]   Location [address] [place] [address] Attending Paloma Candelaria MD   [address] [facility] Day # 0 [facility]S \[facility] [place] [facility]\"" Subcutaneous lidocaine was instilled into the superficial soft tissues for local anesthesia. Under fluoroscopic guidance, a 22-gauge, 100-mm SMK needle was advanced into the inferior pole of the left sacroiliac joint.  The second needle was placed into the

## 2018-05-10 ENCOUNTER — TELEPHONE (OUTPATIENT)
Dept: SURGERY | Facility: CLINIC | Age: 43
End: 2018-05-10

## 2018-05-10 NOTE — TELEPHONE ENCOUNTER
Spoke to patient, confirmed procedure date of 05/15 and to be checked in at outpatient registration at 12 pm. Patient called pre-procedure line and understood instructions.  Patient instructed to call office if there are additional questions after listening

## 2018-05-14 ENCOUNTER — TELEPHONE (OUTPATIENT)
Dept: PHYSICAL THERAPY | Age: 43
End: 2018-05-14

## 2018-05-14 ENCOUNTER — APPOINTMENT (OUTPATIENT)
Dept: PHYSICAL THERAPY | Age: 43
End: 2018-05-14
Payer: MEDICARE

## 2018-05-15 ENCOUNTER — SURGERY (OUTPATIENT)
Age: 43
End: 2018-05-15

## 2018-05-15 ENCOUNTER — APPOINTMENT (OUTPATIENT)
Dept: GENERAL RADIOLOGY | Facility: HOSPITAL | Age: 43
End: 2018-05-15
Attending: ANESTHESIOLOGY
Payer: MEDICARE

## 2018-05-15 ENCOUNTER — HOSPITAL ENCOUNTER (OUTPATIENT)
Facility: HOSPITAL | Age: 43
Setting detail: HOSPITAL OUTPATIENT SURGERY
Discharge: HOME | End: 2018-05-15
Attending: ANESTHESIOLOGY | Admitting: ANESTHESIOLOGY
Payer: MEDICARE

## 2018-05-15 VITALS
OXYGEN SATURATION: 97 % | DIASTOLIC BLOOD PRESSURE: 87 MMHG | RESPIRATION RATE: 18 BRPM | SYSTOLIC BLOOD PRESSURE: 138 MMHG | HEART RATE: 90 BPM | TEMPERATURE: 98 F

## 2018-05-15 DIAGNOSIS — M47.816 LUMBAR FACET ARTHROPATHY: ICD-10-CM

## 2018-05-15 DIAGNOSIS — M47.817 SPONDYLOSIS OF LUMBOSACRAL REGION, UNSPECIFIED SPINAL OSTEOARTHRITIS COMPLICATION STATUS: ICD-10-CM

## 2018-05-15 PROCEDURE — 76000 FLUOROSCOPY <1 HR PHYS/QHP: CPT | Performed by: ANESTHESIOLOGY

## 2018-05-15 PROCEDURE — 81025 URINE PREGNANCY TEST: CPT | Performed by: ANESTHESIOLOGY

## 2018-05-15 PROCEDURE — 99152 MOD SED SAME PHYS/QHP 5/>YRS: CPT | Performed by: ANESTHESIOLOGY

## 2018-05-15 PROCEDURE — 3E0T3TZ INTRODUCTION OF DESTRUCTIVE AGENT INTO PERIPHERAL NERVES AND PLEXI, PERCUTANEOUS APPROACH: ICD-10-PCS | Performed by: ANESTHESIOLOGY

## 2018-05-15 PROCEDURE — BR161ZZ FLUOROSCOPY OF LUMBAR FACET JOINT(S) USING LOW OSMOLAR CONTRAST: ICD-10-PCS | Performed by: ANESTHESIOLOGY

## 2018-05-15 RX ORDER — DIPHENHYDRAMINE HYDROCHLORIDE 50 MG/ML
50 INJECTION INTRAMUSCULAR; INTRAVENOUS ONCE AS NEEDED
Status: DISCONTINUED | OUTPATIENT
Start: 2018-05-15 | End: 2018-05-15 | Stop reason: HOSPADM

## 2018-05-15 RX ORDER — SODIUM CHLORIDE, SODIUM LACTATE, POTASSIUM CHLORIDE, CALCIUM CHLORIDE 600; 310; 30; 20 MG/100ML; MG/100ML; MG/100ML; MG/100ML
100 INJECTION, SOLUTION INTRAVENOUS CONTINUOUS
Status: DISCONTINUED | OUTPATIENT
Start: 2018-05-15 | End: 2018-05-15

## 2018-05-15 RX ORDER — LIDOCAINE HYDROCHLORIDE 10 MG/ML
INJECTION, SOLUTION EPIDURAL; INFILTRATION; INTRACAUDAL; PERINEURAL AS NEEDED
Status: DISCONTINUED | OUTPATIENT
Start: 2018-05-15 | End: 2018-05-15 | Stop reason: HOSPADM

## 2018-05-15 RX ORDER — ONDANSETRON 2 MG/ML
4 INJECTION INTRAMUSCULAR; INTRAVENOUS ONCE AS NEEDED
Status: DISCONTINUED | OUTPATIENT
Start: 2018-05-15 | End: 2018-05-15

## 2018-05-15 RX ORDER — DIPHENHYDRAMINE HYDROCHLORIDE 50 MG/ML
50 INJECTION INTRAMUSCULAR; INTRAVENOUS ONCE AS NEEDED
Status: DISCONTINUED | OUTPATIENT
Start: 2018-05-15 | End: 2018-05-15

## 2018-05-15 RX ORDER — MIDAZOLAM HYDROCHLORIDE 1 MG/ML
INJECTION INTRAMUSCULAR; INTRAVENOUS AS NEEDED
Status: DISCONTINUED | OUTPATIENT
Start: 2018-05-15 | End: 2018-05-15 | Stop reason: HOSPADM

## 2018-05-15 RX ORDER — METHYLPREDNISOLONE ACETATE 40 MG/ML
INJECTION, SUSPENSION INTRA-ARTICULAR; INTRALESIONAL; INTRAMUSCULAR; SOFT TISSUE AS NEEDED
Status: DISCONTINUED | OUTPATIENT
Start: 2018-05-15 | End: 2018-05-15 | Stop reason: HOSPADM

## 2018-05-15 RX ORDER — ONDANSETRON 2 MG/ML
4 INJECTION INTRAMUSCULAR; INTRAVENOUS ONCE AS NEEDED
Status: DISCONTINUED | OUTPATIENT
Start: 2018-05-15 | End: 2018-05-15 | Stop reason: HOSPADM

## 2018-05-15 NOTE — OPERATIVE REPORT
[facility]  Operative Report  5/15/2018     Jessica Mina Patient Status:  Hospital Outpatient Surgery    1975 MRN [id number]   [facility] Attending Abbie Phan MD   Hosp Day # 0 PCP Haroon Ramos MD prepped and draped in sterile fashion. The skin was anesthetized via 25-gauge 1.5\" needle with approximately 2 mL of 1% lidocaine for local anesthesia.   Under fluoroscopic guidance, a 22-gauge, 100-mm SMK needle was advanced to the junction of the superio

## 2018-05-16 ENCOUNTER — PATIENT OUTREACH (OUTPATIENT)
Dept: CASE MANAGEMENT | Age: 43
End: 2018-05-16

## 2018-05-16 DIAGNOSIS — E78.2 MIXED HYPERLIPIDEMIA: ICD-10-CM

## 2018-05-16 DIAGNOSIS — I10 ESSENTIAL HYPERTENSION: ICD-10-CM

## 2018-05-16 DIAGNOSIS — Z98.1 S/P LUMBAR SPINAL FUSION: ICD-10-CM

## 2018-05-16 DIAGNOSIS — J45.50 UNCOMPLICATED SEVERE PERSISTENT ASTHMA: ICD-10-CM

## 2018-05-16 PROCEDURE — 99490 CHRNC CARE MGMT STAFF 1ST 20: CPT

## 2018-05-16 NOTE — PROGRESS NOTES
5/16/2018  Spoke to Madelyn for CCM. Updates to patient care team/ comments: No changes no care team   Patient reported changes in medications: No changes to medications  Med Adherence  Comment: Reviewed.    Taking all medications as prescribed at [facility] having ongoing issues with seasonal allergies and sinus problems.                   - Plan for overcoming all barriers: Pt to f/up with PCP office this week if symptoms persist.            Patient agrees to goal action plan.   Self-Management Abilities (pat

## 2018-05-21 ENCOUNTER — OFFICE VISIT (OUTPATIENT)
Dept: PHYSICAL THERAPY | Age: 43
End: 2018-05-21
Attending: INTERNAL MEDICINE
Payer: MEDICARE

## 2018-05-21 PROCEDURE — 97140 MANUAL THERAPY 1/> REGIONS: CPT

## 2018-05-21 PROCEDURE — 97112 NEUROMUSCULAR REEDUCATION: CPT

## 2018-05-21 PROCEDURE — 97530 THERAPEUTIC ACTIVITIES: CPT

## 2018-05-21 NOTE — PROGRESS NOTES
Dx: Abdominal adhesions (K66.0)             Authorized # of Visits:  8/12         Next MD visit: none scheduled  Fall Risk: standard         Precautions: n/a           \"Дмитрий\"  Subjective:   Dr. Caron Gutierrez performed [place] 4) Pt will understand bladder/bowel health and bladder retraining for progressive management to increase voiding intervals to every 3-4 hours without leakage (10 visits)  5) Pt. exhibits better scar tissue and visceral mobility resulting in reduced abdomin Scar tissue release Scar tissue release CTR/ MFR Scar tissue release CTR/ MFR/ Uterus mobilization Scar tissue release CTR/ MFR/ Uterus mobilization Scar tissue release CTR/ MFR/ Colon massage reversed Scar tissue release CTR/ MFR/ Colon massage reversed A Constipation is defined as infrequent (fewer than three) bowel movements per week.  About 80% of people experience constipation during their lifetime and brief periods of constipation is normal.  Common symptoms can include:  ? Decrease in amount of stool Constipation is another possible cause of bladder control problems. When the rectum is full of stool, it may disturb the bladder. Chronic constipation and/or straining can lead to excessive stress on pelvic organs and nerves.  This condition also contribut It is also helpful to properly position yourself on the toilet to allow for maximal relaxation of your pelvic floor muscles. Be sure your feet are supported or use a stool to obtain maximal hip and knee flexion, similar to a squat position.  Leaning forward

## 2018-05-23 ENCOUNTER — TELEPHONE (OUTPATIENT)
Dept: PHYSICAL THERAPY | Age: 43
End: 2018-05-23

## 2018-06-04 ENCOUNTER — OFFICE VISIT (OUTPATIENT)
Dept: PHYSICAL THERAPY | Age: 43
End: 2018-06-04
Attending: INTERNAL MEDICINE
Payer: MEDICARE

## 2018-06-04 PROCEDURE — 97112 NEUROMUSCULAR REEDUCATION: CPT

## 2018-06-04 PROCEDURE — 97140 MANUAL THERAPY 1/> REGIONS: CPT

## 2018-06-04 PROCEDURE — 97530 THERAPEUTIC ACTIVITIES: CPT

## 2018-06-04 NOTE — PROGRESS NOTES
Progress Note  Dx: Abdominal adhesions (K66.0)             Authorized # of Visits:  9/12         Next MD visit: none scheduled  Fall Risk: standard         Precautions: n/a           \"Дмитрий\"  Subjective:   Lymphedema in legs started on Wednesday night. Upon Orthopedic evaluation, patient has significant scar tissue adhesion especially at inverse T from wet to dry dressing required for poor  healing.  Patient also has core weakness with 2/5 Transverse Abdominis strength and pain with  involvement Plan: Patient will be seen for 1-2 x/week or a total of 12 visits over a 90 day period.  Treatment will include: Treatment will include: Neuromuscular re-education, including use of biofeedback to aid in pelvic floor muscle retraining (down training, up tr Self scar MFR with ball or vibration Self scar MFR with ball Self scar MFR with ball and self Reversed Colon massage R Ovary into wet to dry midline Iliohypogastric nerve R>L integration to L1 Iliohypogastric nerve R>L integration to L1  Lumbar plexus inte ? Need for enemas, suppositories and/or laxatives in order to maintain regularity    Any persistent change in bowel habit, such as an increase or decrease in frequency or size of stool, blood in stool, or an increased difficulty in evacuating, warrants a m Most people in [address] [place] [address] need more fiber in their diet. Fiber supplements or other bulking agents sold at drug stores are available.  Fiber supplements take several weeks, possibly months, to reach full effectiveness, but they are not habit forming or This recipe is commonly suggested to promote regular bowel function by increasing dietary fiber. You may experience a bloated feeling and have gas when adding fiber to your diet but this should pass within a few weeks.  This may be eased by adding fiber sl

## 2018-06-08 ENCOUNTER — OFFICE VISIT (OUTPATIENT)
Dept: SURGERY | Facility: CLINIC | Age: 43
End: 2018-06-08

## 2018-06-08 VITALS
BODY MASS INDEX: 42.68 KG/M2 | TEMPERATURE: 99 F | HEIGHT: 64 IN | WEIGHT: 250 LBS | SYSTOLIC BLOOD PRESSURE: 166 MMHG | HEART RATE: 94 BPM | DIASTOLIC BLOOD PRESSURE: 105 MMHG

## 2018-06-08 DIAGNOSIS — L98.9 SKIN LESION OF BACK: Primary | ICD-10-CM

## 2018-06-08 PROCEDURE — 99212 OFFICE O/P EST SF 10 MIN: CPT | Performed by: SURGERY

## 2018-06-08 NOTE — H&P
New Patient Visit Note       Active Problems      1. Skin lesion of back        Chief Complaint   Patient presents with:  Moles: mole on back- wants it removed. has been seen by aleja in 2016. 1 mole in lower back center of spine.  mole has gotten bigger si Comment: MALROTATION  No date: REMOVAL OF KIDNEY STONE      Comment: x3  No date: TUBAL LIGATION    The family history and social history have been reviewed by me today.     Family History   Problem Relation Age of Onset   • Diabetes Mother 39   • Hyperte 30 tablet Rfl: 2   HYDROcodone-acetaminophen  MG Oral Tab TK ONE T PO Q 6 H PRN P. Disp:  Rfl: 0   ALPRAZolam 0.25 MG Oral Tab Take 1 tablet (0.25 mg total) by mouth every 6 (six) hours as needed for Anxiety.  Disp: 30 tablet Rfl: 1   estradiol 1 MG O (BP Location: Right arm, Patient Position: Sitting, Cuff Size: large)   Pulse 94   Temp 98.6 °F (37 °C) (Oral)   Ht 64\"   Wt 250 lb   LMP 05/17/2018   BMI 42.91 kg/m²   Physical Exam        Assessment   Skin lesion of back  (primary encounter diagnosis)

## 2018-06-11 ENCOUNTER — OFFICE VISIT (OUTPATIENT)
Dept: PHYSICAL THERAPY | Age: 43
End: 2018-06-11
Attending: INTERNAL MEDICINE
Payer: MEDICARE

## 2018-06-11 PROCEDURE — 97530 THERAPEUTIC ACTIVITIES: CPT

## 2018-06-11 PROCEDURE — 97112 NEUROMUSCULAR REEDUCATION: CPT

## 2018-06-11 NOTE — PROGRESS NOTES
Discharge Progress Note  Dx: Abdominal adhesions (K66.0)             Authorized # of Visits:  10/12         Next MD visit: none scheduled  Fall Risk: standard         Precautions: n/a           \"Дмитрий\"  Subjective:  50-60% improvement in abdominal pain. 3) Pt will be independent and compliant with comprehensive HEP to maintain progress achieved in PT (10 visits)MET  4) Pt will understand bladder/bowel health and bladder retraining for progressive management to increase voiding intervals to every 3-4 hours Defecation postures and Diet modification for [place] stool #4    Pelvic brace seated  10 count  with ball Adduction 5' and blue t-band abduction Pelvic brace seated  10 count  with ball Adduction 5' and blue t-band abduction Pelvic brace seated  10 count Any persistent change in bowel habit, such as an increase or decrease in frequency or size of stool, blood in stool, or an increased difficulty in evacuating, warrants a medical consultation. WHAT ARE NORMAL BOWEL HABITS?   For most people, it is normal Most people in [address] [place] [address] need more fiber in their diet. Fiber supplements or other bulking agents sold at drug stores are available.  Fiber supplements take several weeks, possibly months, to reach full effectiveness, but they are not habit forming or This recipe is commonly suggested to promote regular bowel function by increasing dietary fiber. You may experience a bloated feeling and have gas when adding fiber to your diet but this should pass within a few weeks.  This may be eased by adding fiber sl

## 2018-06-13 ENCOUNTER — PATIENT OUTREACH (OUTPATIENT)
Dept: CASE MANAGEMENT | Age: 43
End: 2018-06-13

## 2018-06-13 DIAGNOSIS — Z98.1 S/P LUMBAR SPINAL FUSION: ICD-10-CM

## 2018-06-13 DIAGNOSIS — I10 ESSENTIAL HYPERTENSION: ICD-10-CM

## 2018-06-13 DIAGNOSIS — E78.2 MIXED HYPERLIPIDEMIA: ICD-10-CM

## 2018-06-13 DIAGNOSIS — J45.50 UNCOMPLICATED SEVERE PERSISTENT ASTHMA: ICD-10-CM

## 2018-06-13 NOTE — PROGRESS NOTES
6/13/2018  Spoke to Madelyn for CCM. Updates to patient care team/ comments: No changes to care team  Patient reported changes in medications: No changes to medications  Med Adherence  Comment: Reviewed.   Taking all medications as prescribed at this Weight lose - eating lowfat, portion control diet. • Patient reported progress toward goal: Pt states she has not lost or gained weight and currently at 250 pounds. Pt states she is doing a food journal to keep track of her calories and portions.   Pt s

## 2018-06-30 PROCEDURE — 99490 CHRNC CARE MGMT STAFF 1ST 20: CPT

## 2018-07-03 RX ORDER — AMLODIPINE BESYLATE 10 MG/1
TABLET ORAL
Qty: 30 TABLET | Refills: 0 | Status: SHIPPED | OUTPATIENT
Start: 2018-07-03 | End: 2018-10-25

## 2018-07-13 ENCOUNTER — PATIENT OUTREACH (OUTPATIENT)
Dept: CASE MANAGEMENT | Age: 43
End: 2018-07-13

## 2018-07-13 NOTE — PROGRESS NOTES
Unable to lvm today for ccm. Will try another time. Care plan: Reviewed. Per pt asthma control test completed at last pulmonologist appt. Time spent: 2 min collecting, reviewing pt data, communication and documentation.

## 2018-07-27 ENCOUNTER — PATIENT OUTREACH (OUTPATIENT)
Dept: CASE MANAGEMENT | Age: 43
End: 2018-07-27

## 2018-07-27 NOTE — PROGRESS NOTES
LVM requesting a call back for ccm. Reminded pt per PCP office due for a f/up appt to recheck blood pressure. Advised to call PCP office to schedule. Time spent: 2 min collecting, reviewing pt data, communication and documentation.

## 2018-08-15 DIAGNOSIS — J45.50 UNCOMPLICATED SEVERE PERSISTENT ASTHMA: ICD-10-CM

## 2018-08-22 ENCOUNTER — OFFICE VISIT (OUTPATIENT)
Dept: INTERNAL MEDICINE CLINIC | Facility: CLINIC | Age: 43
End: 2018-08-22

## 2018-08-22 VITALS
SYSTOLIC BLOOD PRESSURE: 158 MMHG | DIASTOLIC BLOOD PRESSURE: 104 MMHG | WEIGHT: 262 LBS | BODY MASS INDEX: 44.73 KG/M2 | HEART RATE: 104 BPM | RESPIRATION RATE: 16 BRPM | TEMPERATURE: 98 F | HEIGHT: 64 IN

## 2018-08-22 DIAGNOSIS — I10 ESSENTIAL HYPERTENSION: ICD-10-CM

## 2018-08-22 RX ORDER — HYDROCODONE BITARTRATE AND ACETAMINOPHEN 10; 325 MG/1; MG/1
TABLET ORAL
Qty: 90 TABLET | Refills: 0 | Status: SHIPPED | OUTPATIENT
Start: 2018-08-22 | End: 2019-05-24

## 2018-08-22 RX ORDER — ALPRAZOLAM 0.5 MG/1
0.5 TABLET ORAL 3 TIMES DAILY PRN
Qty: 90 TABLET | Refills: 0 | Status: SHIPPED
Start: 2018-08-22 | End: 2019-09-17

## 2018-08-22 RX ORDER — HYDRALAZINE HYDROCHLORIDE 25 MG/1
50 TABLET, FILM COATED ORAL 3 TIMES DAILY
Qty: 90 TABLET | Refills: 1 | COMMUNITY
Start: 2018-08-22 | End: 2018-10-26

## 2018-08-22 NOTE — PROGRESS NOTES
Patient unable to stay for visit due to time constraint. However discussed meds and uncontrolled BP. Telephone call: pt's BP at home has been 150's/90's. Believes it is a combination of poorly controlled back pain and anxiety.  C/o worsened anxiety d/t

## 2018-08-22 NOTE — PATIENT INSTRUCTIONS
Increase hydralazine to 50mg three times daily  Take alprazolam up to three times daily, as needed, for anxiety  Monitor blood pressure. Keep track and bring numbers to your next office visit.

## 2018-08-23 ENCOUNTER — LAB REQUISITION (OUTPATIENT)
Dept: LAB | Facility: HOSPITAL | Age: 43
End: 2018-08-23
Payer: MEDICARE

## 2018-08-23 DIAGNOSIS — Z01.818 ENCOUNTER FOR OTHER PREPROCEDURAL EXAMINATION: ICD-10-CM

## 2018-08-23 PROCEDURE — 88305 TISSUE EXAM BY PATHOLOGIST: CPT | Performed by: SURGERY

## 2018-08-29 ENCOUNTER — PATIENT OUTREACH (OUTPATIENT)
Dept: CASE MANAGEMENT | Age: 43
End: 2018-08-29

## 2018-08-29 DIAGNOSIS — J45.50 UNCOMPLICATED SEVERE PERSISTENT ASTHMA: ICD-10-CM

## 2018-08-29 DIAGNOSIS — I10 ESSENTIAL HYPERTENSION: ICD-10-CM

## 2018-08-29 DIAGNOSIS — E78.2 MIXED HYPERLIPIDEMIA: ICD-10-CM

## 2018-08-29 NOTE — PROGRESS NOTES
8/29/2018  Spoke to Babak Willson for CCM introduced myself as Chronic Care Manager notified I would be mailing letter with my information for there records. Nell has transition into a new position and I would be taking over CCM for her.   Understanding verbal Up: review progress and or barriers towards patients goals. Care Managers Interventions: Continue to provide encouragement, support, and education for healthy coping and dx.   Time Spent This Encounter Total: 21 min medical record review, telephone comm

## 2018-08-31 ENCOUNTER — OFFICE VISIT (OUTPATIENT)
Dept: INTERNAL MEDICINE CLINIC | Facility: CLINIC | Age: 43
End: 2018-08-31
Payer: MEDICARE

## 2018-08-31 VITALS
SYSTOLIC BLOOD PRESSURE: 136 MMHG | BODY MASS INDEX: 44.22 KG/M2 | RESPIRATION RATE: 16 BRPM | DIASTOLIC BLOOD PRESSURE: 88 MMHG | WEIGHT: 259 LBS | HEART RATE: 80 BPM | TEMPERATURE: 98 F | HEIGHT: 64 IN

## 2018-08-31 DIAGNOSIS — I10 ESSENTIAL HYPERTENSION: Primary | ICD-10-CM

## 2018-08-31 DIAGNOSIS — G89.29 CHRONIC BILATERAL LOW BACK PAIN WITHOUT SCIATICA: ICD-10-CM

## 2018-08-31 DIAGNOSIS — M54.50 CHRONIC BILATERAL LOW BACK PAIN WITHOUT SCIATICA: ICD-10-CM

## 2018-08-31 DIAGNOSIS — F41.1 GENERALIZED ANXIETY DISORDER: ICD-10-CM

## 2018-08-31 DIAGNOSIS — J01.00 ACUTE NON-RECURRENT MAXILLARY SINUSITIS: ICD-10-CM

## 2018-08-31 PROCEDURE — 99214 OFFICE O/P EST MOD 30 MIN: CPT | Performed by: PHYSICIAN ASSISTANT

## 2018-08-31 PROCEDURE — 99490 CHRNC CARE MGMT STAFF 1ST 20: CPT

## 2018-08-31 RX ORDER — DULOXETIN HYDROCHLORIDE 60 MG/1
60 CAPSULE, DELAYED RELEASE ORAL DAILY
Qty: 30 CAPSULE | Refills: 2 | Status: SHIPPED | OUTPATIENT
Start: 2018-08-31 | End: 2018-10-25

## 2018-08-31 RX ORDER — ONDANSETRON 4 MG/1
4 TABLET, FILM COATED ORAL EVERY 8 HOURS PRN
Qty: 20 TABLET | Refills: 2 | Status: SHIPPED | OUTPATIENT
Start: 2018-08-31 | End: 2019-10-17

## 2018-08-31 RX ORDER — AMOXICILLIN AND CLAVULANATE POTASSIUM 875; 125 MG/1; MG/1
1 TABLET, FILM COATED ORAL 2 TIMES DAILY
Qty: 20 TABLET | Refills: 0 | Status: SHIPPED | OUTPATIENT
Start: 2018-08-31 | End: 2018-09-10

## 2018-08-31 RX ORDER — PREDNISONE 20 MG/1
TABLET ORAL
Qty: 18 TABLET | Refills: 0 | Status: SHIPPED | OUTPATIENT
Start: 2018-08-31 | End: 2018-09-27 | Stop reason: ALTCHOICE

## 2018-08-31 NOTE — PATIENT INSTRUCTIONS
Continue current medications and track blood pressure  Start cymbalta once a day.  If nauseous, take zofran when needed  Monitor sinuses and asthma; if worsening, start augmentin and prednisone

## 2018-08-31 NOTE — PROGRESS NOTES
Jessica Mina is a 44year old female. HPI:   Patient presents for recheck of her hypertension. Pt has been taking medications as instructed, no medication side effects, last week hydralazine was increased to 50mg TID.   Home BP monitoring i DR [employer] Take 1 capsule (60 mg total) by mouth daily. Disp: 30 capsule Rfl: 2   Ondansetron HCl (ZOFRAN) 4 mg tablet Take 1 tablet (4 mg total) by mouth every 8 (eight) hours as needed for Nausea.  Disp: 20 tablet Rfl: 2   predniSONE 20 MG Oral Tab Take radiofrequency ablation sacral and lumbar,                multiple dates, Dr. Larry Moura  No date: APPENDECTOMY  03/17/2010: BACK SURGERY      Comment: L4-L5, L5-S1 anterior lumbar interbody fusion  9/14/2009: BACK SURGERY      Comment: L3-4, L4-5, L5 S1 microdi affect normal     ASSESSMENT AND PLAN:   Essential hypertension  (primary encounter diagnosis)  Generalized anxiety disorder  Chronic bilateral low back pain without sciatica  Acute non-recurrent maxillary sinusitis       -HTN improved.  Continue current tr

## 2018-09-04 ENCOUNTER — OFFICE VISIT (OUTPATIENT)
Dept: SURGERY | Facility: CLINIC | Age: 43
End: 2018-09-04
Payer: MEDICARE

## 2018-09-04 VITALS
HEIGHT: 64 IN | SYSTOLIC BLOOD PRESSURE: 158 MMHG | BODY MASS INDEX: 44.22 KG/M2 | DIASTOLIC BLOOD PRESSURE: 99 MMHG | TEMPERATURE: 98 F | HEART RATE: 96 BPM | WEIGHT: 259 LBS

## 2018-09-04 DIAGNOSIS — D22.9 NEVUS: Primary | ICD-10-CM

## 2018-09-04 PROCEDURE — 99212 OFFICE O/P EST SF 10 MIN: CPT | Performed by: PHYSICIAN ASSISTANT

## 2018-09-04 NOTE — PROGRESS NOTES
Follow Up Visit Note       Active Problems      1.  Nevus          Chief Complaint   Patient presents with:  Suture Removal: LESION REMOVAL 8/23 [facility]CK- STITCHES REMOVAL    History of Present Illness  Patient is a pleasant 56-year-old female who presents for p STIMULATOR                GENERATOR  No date: OTHER SURGICAL HISTORY      Comment: MALROTATION  No date: REMOVAL OF KIDNEY STONE      Comment: x3  2014: TUBAL LIGATION    The family history and social history have been reviewed by me today.     Family Histo Tab Take 2 tablets (50 mg total) by mouth 3 (three) times daily. Disp: 90 tablet Rfl: 1   ALPRAZolam 0.5 MG Oral Tab Take 1 tablet (0.5 mg total) by mouth 3 (three) times daily as needed for Anxiety.  Disp: 90 tablet Rfl: 0   VENTOLIN  (90 Base) MCG/ Soft. She exhibits no distension. Musculoskeletal: Normal range of motion. Back:    Incision clean, dry, intact, without erythema or cellulitis. Neurological: She is alert and oriented to person, place, and time. Skin: Skin is warm and dry.  Sh

## 2018-09-12 DIAGNOSIS — J45.50 UNCOMPLICATED SEVERE PERSISTENT ASTHMA: ICD-10-CM

## 2018-09-13 ENCOUNTER — PATIENT OUTREACH (OUTPATIENT)
Dept: CASE MANAGEMENT | Age: 43
End: 2018-09-13

## 2018-09-21 ENCOUNTER — TELEPHONE (OUTPATIENT)
Dept: INTERNAL MEDICINE CLINIC | Facility: CLINIC | Age: 43
End: 2018-09-21

## 2018-09-21 ENCOUNTER — OFFICE VISIT (OUTPATIENT)
Dept: PAIN CLINIC | Facility: CLINIC | Age: 43
End: 2018-09-21
Payer: MEDICARE

## 2018-09-21 ENCOUNTER — TELEPHONE (OUTPATIENT)
Dept: PAIN CLINIC | Facility: CLINIC | Age: 43
End: 2018-09-21

## 2018-09-21 VITALS
HEIGHT: 64 IN | BODY MASS INDEX: 44.22 KG/M2 | DIASTOLIC BLOOD PRESSURE: 88 MMHG | HEART RATE: 98 BPM | WEIGHT: 259 LBS | SYSTOLIC BLOOD PRESSURE: 156 MMHG

## 2018-09-21 DIAGNOSIS — G89.29 CHRONIC BILATERAL LOW BACK PAIN WITHOUT SCIATICA: Primary | ICD-10-CM

## 2018-09-21 DIAGNOSIS — M96.1 LUMBAR POST-LAMINECTOMY SYNDROME: ICD-10-CM

## 2018-09-21 DIAGNOSIS — M54.50 CHRONIC BILATERAL LOW BACK PAIN WITHOUT SCIATICA: Primary | ICD-10-CM

## 2018-09-21 DIAGNOSIS — M47.819 SPONDYLOSIS WITHOUT MYELOPATHY: Primary | ICD-10-CM

## 2018-09-21 DIAGNOSIS — M47.816 LUMBAR FACET ARTHROPATHY: ICD-10-CM

## 2018-09-21 DIAGNOSIS — Z98.1 S/P LUMBAR SPINAL FUSION: ICD-10-CM

## 2018-09-21 DIAGNOSIS — I87.2 VENOUS INSUFFICIENCY OF BOTH LOWER EXTREMITIES: Primary | ICD-10-CM

## 2018-09-21 PROCEDURE — 99214 OFFICE O/P EST MOD 30 MIN: CPT | Performed by: NURSE PRACTITIONER

## 2018-09-21 RX ORDER — FUROSEMIDE 20 MG/1
20 TABLET ORAL DAILY
Qty: 5 TABLET | Refills: 0 | Status: SHIPPED | OUTPATIENT
Start: 2018-09-21 | End: 2018-10-25

## 2018-09-21 RX ORDER — GABAPENTIN 100 MG/1
100 CAPSULE ORAL 3 TIMES DAILY
Qty: 90 CAPSULE | Refills: 0 | Status: SHIPPED | OUTPATIENT
Start: 2018-09-21

## 2018-09-21 NOTE — TELEPHONE ENCOUNTER
Please place cases with sedation:    RFA right SI    RFA right lumbar MB    RFA left SI    RFA lumbar    Thank you. Patient has neuro stim.

## 2018-09-21 NOTE — PATIENT INSTRUCTIONS
Refill policies:    • Allow 2-3 business days for refills; controlled substances may take longer.   • Contact your pharmacy at least 5 days prior to running out of medication and have them send an electronic request or submit request through the “request re entire amount billed. Precertification and Prior Authorizations: If your physician has recommended that you have a procedure or additional testing performed.   [facility]) will contact your insurance carrier to obtain pre-certi procedure:  Please update us prior to the procedure if you are experiencing any symptoms of infection such as cough, fever, chills, urinary symptoms, or have recently been prescribed antibiotics, have open wounds, have recently had surgery or dental proced (Prasugrel) 7 days  • Pradaxa 10 days  • Trental 7 days  • Eliquis (Apixaban) 3 days  • Xarelto (Rivaroxaban) 3 days  • Lovenox (Enoxaparin) 24 hours  • Aspirin  • 81mg 24 hours  • Greater than 81 mg (325mg) 7 days  • Coumadin       5 days  • Procedure may minutes on 20 minutes off) for comfort.     ** TO AVOID CANCELLATION AND/OR RESCHEDULING: PLEASE CALL [facility] PRE-PROCEDURE LINE -[phone] FOR DETAILED INSTRUCTIONS FIVE TO SEVEN DAYS PRIOR TO PROCEDURE**

## 2018-09-21 NOTE — TELEPHONE ENCOUNTER
Patient called and requested a water pill to be prescribed. Please call back. She wants sent to [facility] on file.

## 2018-09-21 NOTE — PROGRESS NOTES
Name: Domo Turner   : 1975   DOS: 2018     Pain Clinic Follow Up Visit:   Domo Turner is a 44year old female who presents for recheck of her chronic low back pain.  Today she is c/o of bilateral lower back/sacral pa 2   predniSONE 20 MG Oral Tab Take 3 tablets days 1-3, then 2 tablets days 4-6, then 1 tablet days 7-9, then stop. Disp: 18 tablet Rfl: 0   HYDROcodone-acetaminophen  MG Oral Tab TK ONE T PO Q 6 H PRN P.  Disp: 90 tablet Rfl: 0   hydrALAzine HCl 25 MG post-laminectomy syndrome  S/p lumbar spinal fusion      1. I am recommending repeat of radiofrequency ablations to first right SI, right lumbar medial branch (any future date), followed by left SI and left lumbar medial branch (In November).  Procedures an

## 2018-09-21 NOTE — TELEPHONE ENCOUNTER
Spoke with patient and she c/o edema in bilateral lower extremities that has worsened in the last 3 days since getting off steroid. States she continues to take all medications as prescribed.    Patient reports her BP at Dr. Lauro Maloney office was 153/88    Pa

## 2018-09-27 ENCOUNTER — PATIENT OUTREACH (OUTPATIENT)
Dept: CASE MANAGEMENT | Age: 43
End: 2018-09-27

## 2018-09-27 DIAGNOSIS — F41.1 GENERALIZED ANXIETY DISORDER: ICD-10-CM

## 2018-09-27 DIAGNOSIS — J45.50 UNCOMPLICATED SEVERE PERSISTENT ASTHMA: ICD-10-CM

## 2018-09-27 DIAGNOSIS — I10 ESSENTIAL HYPERTENSION: ICD-10-CM

## 2018-09-27 DIAGNOSIS — E78.2 MIXED HYPERLIPIDEMIA: ICD-10-CM

## 2018-09-27 NOTE — PROGRESS NOTES
9/27/2018  Spoke to Madelyn for CCM. Updates to patient care team/comments: n/a. Patient reported changes in medications: yes, pt reports being done with Prednisone taper. Medication list up dated.    Med Adherence  Comment: pt reports taking all m reports she can't afford to do so. Pt relates she is looking into [employer] for free services. Pt also states section [employer] has opened an office at [employer] and she called them now just awaiting there call.   Margaux Orta reports the water pills have he

## 2018-09-30 PROCEDURE — 99490 CHRNC CARE MGMT STAFF 1ST 20: CPT

## 2018-10-03 ENCOUNTER — OFFICE VISIT (OUTPATIENT)
Dept: INTERNAL MEDICINE CLINIC | Facility: CLINIC | Age: 43
End: 2018-10-03
Payer: MEDICARE

## 2018-10-03 VITALS
DIASTOLIC BLOOD PRESSURE: 88 MMHG | SYSTOLIC BLOOD PRESSURE: 132 MMHG | TEMPERATURE: 99 F | BODY MASS INDEX: 44.73 KG/M2 | HEIGHT: 64 IN | OXYGEN SATURATION: 97 % | HEART RATE: 88 BPM | WEIGHT: 262 LBS

## 2018-10-03 DIAGNOSIS — E78.2 MIXED HYPERLIPIDEMIA: ICD-10-CM

## 2018-10-03 DIAGNOSIS — I10 ESSENTIAL HYPERTENSION: Primary | ICD-10-CM

## 2018-10-03 DIAGNOSIS — Z23 NEED FOR IMMUNIZATION AGAINST INFLUENZA: ICD-10-CM

## 2018-10-03 DIAGNOSIS — J45.50 UNCOMPLICATED SEVERE PERSISTENT ASTHMA: ICD-10-CM

## 2018-10-03 DIAGNOSIS — F41.1 GENERALIZED ANXIETY DISORDER: ICD-10-CM

## 2018-10-03 PROCEDURE — 99214 OFFICE O/P EST MOD 30 MIN: CPT | Performed by: PHYSICIAN ASSISTANT

## 2018-10-03 PROCEDURE — 90686 IIV4 VACC NO PRSV 0.5 ML IM: CPT | Performed by: PHYSICIAN ASSISTANT

## 2018-10-03 PROCEDURE — G0008 ADMIN INFLUENZA VIRUS VAC: HCPCS | Performed by: PHYSICIAN ASSISTANT

## 2018-10-03 NOTE — PROGRESS NOTES
Domo Turner is a 44year old female. HPI:   Patient presents for recheck of her hypertension. Pt has been taking medications as instructed, no medication side effects, home BP monitoring in the range of 835'T systolic and 83'R diastolic. BY MOUTH EVERY 4 HOURS AS NEEDED FOR WHEEZING OR SHORTNESS OF BREATH Disp: 18 g Rfl: 0   DULoxetine HCl 60 MG Oral Cap DR Particles Take 1 capsule (60 mg total) by mouth daily.  Disp: 30 capsule Rfl: 2   Ondansetron HCl (ZOFRAN) 4 mg tablet Take 1 tablet (4 microdiscectomy  2006:       Comment:   x2  2014:   2014:  SECTION; N/A      Comment:  Performed by Ole Guzman MD at [facility] L+D OR  : CHOLECYSTECTOMY  10/2009: D & C  2010: D & C  2017: LUMBAR FACET INJECTION;  Left [facility] MAIN OR  8/23/2016: RADIOFREQUENCY ABLATION OF THORACIC OR LUMBAR MEDIAL   BRANCH; Right      Comment:  Performed by Ana Soto MD at [facility] MAIN OR  2/1/2016: [address]; Left      Comment:  Perform oz      Comment: rare    Drug use: No        REVIEW OF SYSTEMS:   GENERAL HEALTH: feels well otherwise  SKIN: denies any unusual skin lesions or rashes  RESPIRATORY: denies shortness of breath with exertion  CARDIOVASCULAR: denies chest pain on exertion  G

## 2018-10-15 DIAGNOSIS — J45.50 UNCOMPLICATED SEVERE PERSISTENT ASTHMA: ICD-10-CM

## 2018-10-16 ENCOUNTER — HOSPITAL ENCOUNTER (OUTPATIENT)
Dept: MAMMOGRAPHY | Facility: HOSPITAL | Age: 43
Discharge: HOME | End: 2018-10-16
Attending: OBSTETRICS & GYNECOLOGY
Payer: MEDICARE

## 2018-10-16 DIAGNOSIS — Z12.31 ENCOUNTER FOR SCREENING MAMMOGRAM FOR MALIGNANT NEOPLASM OF BREAST: ICD-10-CM

## 2018-10-16 PROCEDURE — 77067 SCR MAMMO BI INCL CAD: CPT | Performed by: OBSTETRICS & GYNECOLOGY

## 2018-10-16 PROCEDURE — 77063 BREAST TOMOSYNTHESIS BI: CPT | Performed by: OBSTETRICS & GYNECOLOGY

## 2018-10-25 DIAGNOSIS — I87.2 VENOUS INSUFFICIENCY OF BOTH LOWER EXTREMITIES: ICD-10-CM

## 2018-10-25 DIAGNOSIS — G89.29 CHRONIC BILATERAL LOW BACK PAIN WITHOUT SCIATICA: ICD-10-CM

## 2018-10-25 DIAGNOSIS — F41.1 GENERALIZED ANXIETY DISORDER: ICD-10-CM

## 2018-10-25 DIAGNOSIS — I10 ESSENTIAL HYPERTENSION: ICD-10-CM

## 2018-10-25 DIAGNOSIS — J45.50 UNCOMPLICATED SEVERE PERSISTENT ASTHMA: ICD-10-CM

## 2018-10-25 DIAGNOSIS — M54.50 CHRONIC BILATERAL LOW BACK PAIN WITHOUT SCIATICA: ICD-10-CM

## 2018-10-25 RX ORDER — FUROSEMIDE 20 MG/1
20 TABLET ORAL DAILY
Qty: 5 TABLET | Refills: 1 | Status: SHIPPED | OUTPATIENT
Start: 2018-10-25 | End: 2019-09-06 | Stop reason: ALTCHOICE

## 2018-10-25 RX ORDER — AMLODIPINE BESYLATE 10 MG/1
10 TABLET ORAL
Qty: 90 TABLET | Refills: 0 | Status: SHIPPED | OUTPATIENT
Start: 2018-10-25 | End: 2018-12-27

## 2018-10-25 RX ORDER — ALBUTEROL SULFATE 90 UG/1
AEROSOL, METERED RESPIRATORY (INHALATION)
Qty: 18 G | Refills: 5 | Status: SHIPPED | OUTPATIENT
Start: 2018-10-25 | End: 2019-02-05

## 2018-10-25 RX ORDER — DULOXETIN HYDROCHLORIDE 60 MG/1
60 CAPSULE, DELAYED RELEASE ORAL DAILY
Qty: 90 CAPSULE | Refills: 1 | Status: SHIPPED | OUTPATIENT
Start: 2018-10-25 | End: 2019-03-13

## 2018-10-25 NOTE — TELEPHONE ENCOUNTER
[facility] pharmacy sent a fax requesting refills on Gabapentin 100 mg and Ventolin HFA 90 MCG/Actuation INH. Please see form in triage.

## 2018-10-25 NOTE — TELEPHONE ENCOUNTER
Fax sent from [address] for refills on Amlodipine 10 mg, Duloxetine 60 mg, and Furosemide 20 mg qty 90 unless otherwise stated. Please see form in triage.

## 2018-10-26 ENCOUNTER — PATIENT OUTREACH (OUTPATIENT)
Dept: CASE MANAGEMENT | Age: 43
End: 2018-10-26

## 2018-10-26 DIAGNOSIS — I10 ESSENTIAL HYPERTENSION: ICD-10-CM

## 2018-10-26 DIAGNOSIS — F41.1 GENERALIZED ANXIETY DISORDER: ICD-10-CM

## 2018-10-26 DIAGNOSIS — J45.50 UNCOMPLICATED SEVERE PERSISTENT ASTHMA: ICD-10-CM

## 2018-10-26 DIAGNOSIS — E78.2 MIXED HYPERLIPIDEMIA: ICD-10-CM

## 2018-10-26 RX ORDER — HYDRALAZINE HYDROCHLORIDE 25 MG/1
50 TABLET, FILM COATED ORAL 3 TIMES DAILY
Qty: 90 TABLET | Refills: 1 | Status: SHIPPED | OUTPATIENT
Start: 2018-10-26 | End: 2018-11-05

## 2018-10-26 NOTE — PROGRESS NOTES
10/26/2018  Spoke to Madelyn for CCM. Updates to patient care team/comments: n/a. Patient reported changes in medications: no changes. Med Adherence  Comment: pt reports taking all medications as prescribed. Health Maintenance:    Your appoi Goals/Action Plan:  Verner Stabler relates having seen OB/GYN and had her Mammogram as well. Pt reports overall she is ok states she has looked into housing so she can move into her own place. Reports her kids father is still not changed and is just not nice. where needed, education, goals and action plan recreation/update. Provided acknowledgment and validation to patient's concerns.    Monthly Minute Total including today: 22    Physical assessment, complete health history, and need for CCM established by [facility]

## 2018-10-29 ENCOUNTER — TELEPHONE (OUTPATIENT)
Dept: INTERNAL MEDICINE CLINIC | Facility: CLINIC | Age: 43
End: 2018-10-29

## 2018-10-29 NOTE — TELEPHONE ENCOUNTER
furosemide 20 MG Oral Tab [[id number]]  90 day  Needs to be sent to [facility] please  Change pharmacy    The other scripts have already been filled.

## 2018-10-29 NOTE — TELEPHONE ENCOUNTER
Per Janet Walsh this is a as needed prescription and 90 day supply is not recommended.   Fax returned with denial.

## 2018-10-29 NOTE — TELEPHONE ENCOUNTER
Refill request from [address] for alprazolam 0.5mg tab - fill out form and fax back to #[phone]. Form in triage.

## 2018-10-31 ENCOUNTER — HOSPITAL ENCOUNTER (OUTPATIENT)
Dept: MAMMOGRAPHY | Facility: HOSPITAL | Age: 43
Discharge: HOME | End: 2018-10-31
Attending: OBSTETRICS & GYNECOLOGY
Payer: MEDICARE

## 2018-10-31 DIAGNOSIS — R92.2 INCONCLUSIVE MAMMOGRAM: ICD-10-CM

## 2018-10-31 PROCEDURE — 99490 CHRNC CARE MGMT STAFF 1ST 20: CPT

## 2018-10-31 PROCEDURE — 76642 ULTRASOUND BREAST LIMITED: CPT | Performed by: OBSTETRICS & GYNECOLOGY

## 2018-10-31 PROCEDURE — 77066 DX MAMMO INCL CAD BI: CPT | Performed by: OBSTETRICS & GYNECOLOGY

## 2018-10-31 PROCEDURE — 77062 BREAST TOMOSYNTHESIS BI: CPT | Performed by: OBSTETRICS & GYNECOLOGY

## 2018-11-02 ENCOUNTER — TELEPHONE (OUTPATIENT)
Dept: SURGERY | Facility: CLINIC | Age: 43
End: 2018-11-02

## 2018-11-02 NOTE — TELEPHONE ENCOUNTER
Spoke to patient, confirmed procedure date of 11/8/18 and to be checked in at outpatient registration at 9:00 am. Patient instructed to call pre-procedure line before procedure at [phone].  Patient instructed to call office if there are additional ques

## 2018-11-05 ENCOUNTER — TELEPHONE (OUTPATIENT)
Dept: INTERNAL MEDICINE CLINIC | Facility: CLINIC | Age: 43
End: 2018-11-05

## 2018-11-05 DIAGNOSIS — I10 ESSENTIAL HYPERTENSION: ICD-10-CM

## 2018-11-05 RX ORDER — HYDRALAZINE HYDROCHLORIDE 25 MG/1
50 TABLET, FILM COATED ORAL 3 TIMES DAILY
Qty: 540 TABLET | Refills: 1 | OUTPATIENT
Start: 2018-11-05 | End: 2018-11-19

## 2018-11-05 NOTE — TELEPHONE ENCOUNTER
Fax from [address] - script for hydralazine 25 needs manual signature before being faxed. Form in triage.

## 2018-11-08 ENCOUNTER — APPOINTMENT (OUTPATIENT)
Dept: GENERAL RADIOLOGY | Facility: HOSPITAL | Age: 43
End: 2018-11-08
Attending: ANESTHESIOLOGY
Payer: MEDICARE

## 2018-11-08 ENCOUNTER — HOSPITAL ENCOUNTER (OUTPATIENT)
Facility: HOSPITAL | Age: 43
Setting detail: HOSPITAL OUTPATIENT SURGERY
Discharge: HOME | End: 2018-11-08
Attending: ANESTHESIOLOGY | Admitting: ANESTHESIOLOGY
Payer: MEDICARE

## 2018-11-08 VITALS
HEART RATE: 96 BPM | SYSTOLIC BLOOD PRESSURE: 146 MMHG | OXYGEN SATURATION: 96 % | TEMPERATURE: 98 F | RESPIRATION RATE: 16 BRPM | DIASTOLIC BLOOD PRESSURE: 85 MMHG

## 2018-11-08 DIAGNOSIS — M47.819 SPONDYLOSIS WITHOUT MYELOPATHY: ICD-10-CM

## 2018-11-08 PROCEDURE — 99152 MOD SED SAME PHYS/QHP 5/>YRS: CPT | Performed by: ANESTHESIOLOGY

## 2018-11-08 PROCEDURE — 81025 URINE PREGNANCY TEST: CPT | Performed by: ANESTHESIOLOGY

## 2018-11-08 PROCEDURE — 3E0T3TZ INTRODUCTION OF DESTRUCTIVE AGENT INTO PERIPHERAL NERVES AND PLEXI, PERCUTANEOUS APPROACH: ICD-10-PCS | Performed by: ANESTHESIOLOGY

## 2018-11-08 RX ORDER — MIDAZOLAM HYDROCHLORIDE 1 MG/ML
INJECTION INTRAMUSCULAR; INTRAVENOUS AS NEEDED
Status: DISCONTINUED | OUTPATIENT
Start: 2018-11-08 | End: 2018-11-08 | Stop reason: HOSPADM

## 2018-11-08 RX ORDER — LIDOCAINE HYDROCHLORIDE 10 MG/ML
INJECTION, SOLUTION EPIDURAL; INFILTRATION; INTRACAUDAL; PERINEURAL AS NEEDED
Status: DISCONTINUED | OUTPATIENT
Start: 2018-11-08 | End: 2018-11-08 | Stop reason: HOSPADM

## 2018-11-08 RX ORDER — ONDANSETRON 2 MG/ML
4 INJECTION INTRAMUSCULAR; INTRAVENOUS ONCE AS NEEDED
Status: DISCONTINUED | OUTPATIENT
Start: 2018-11-08 | End: 2018-11-08

## 2018-11-08 RX ORDER — ONDANSETRON 2 MG/ML
4 INJECTION INTRAMUSCULAR; INTRAVENOUS ONCE AS NEEDED
Status: DISCONTINUED | OUTPATIENT
Start: 2018-11-08 | End: 2018-11-08 | Stop reason: HOSPADM

## 2018-11-08 RX ORDER — SODIUM CHLORIDE, SODIUM LACTATE, POTASSIUM CHLORIDE, CALCIUM CHLORIDE 600; 310; 30; 20 MG/100ML; MG/100ML; MG/100ML; MG/100ML
100 INJECTION, SOLUTION INTRAVENOUS CONTINUOUS
Status: DISCONTINUED | OUTPATIENT
Start: 2018-11-08 | End: 2018-11-08

## 2018-11-08 RX ORDER — METHYLPREDNISOLONE ACETATE 40 MG/ML
INJECTION, SUSPENSION INTRA-ARTICULAR; INTRALESIONAL; INTRAMUSCULAR; SOFT TISSUE AS NEEDED
Status: DISCONTINUED | OUTPATIENT
Start: 2018-11-08 | End: 2018-11-08 | Stop reason: HOSPADM

## 2018-11-08 RX ORDER — DIPHENHYDRAMINE HYDROCHLORIDE 50 MG/ML
50 INJECTION INTRAMUSCULAR; INTRAVENOUS ONCE AS NEEDED
Status: DISCONTINUED | OUTPATIENT
Start: 2018-11-08 | End: 2018-11-08

## 2018-11-08 NOTE — OPERATIVE REPORT
[facility]  Operative Report  2018     Jerry Medrano Patient Status:  Hospital Outpatient Surgery    1975 MRN [id number]   [facility] providers found   [facility] Day # 0 PCP Jim instilled into the superficial soft tissues for local anesthesia. Under fluoroscopic guidance, a 22-gauge, 100-mm SMK needle was advanced into the inferior pole of the right sacroiliac joint.  The second needle was placed into the joint but approximately 1

## 2018-11-08 NOTE — H&P
History & Physical Examination    Patient Name: Alan Epley  MRN: [id number]  CSN: [id number]  YOB: 1975    Pre-Operative Diagnosis:  Spondylosis without myelopathy [M47.819]    Present Illness: A 44year old female with low back Montelukast Sodium (SINGULAIR) 10 MG Oral Tab TAKE 1 TABLET BY MOUTH NIGHTLY (Patient taking differently: TAKE 1 TABLET BY MOUTH morning) Disp: 90 tablet Rfl: 1 Taking       Current Facility-Administered Medications:  lactated ringers infusion 100 mL/hr GENERATOR   • OTHER SURGICAL HISTORY      MALROTATION   • RADIOFREQUENCY ABLATION OF SACROILIAC JOINT Left 5/8/2018    Performed by Vimal Medrano MD at [facility] MAIN OR   • [address] Right 3/6/2018    Performed by Josue Begum 7/15/2015    Performed by Inna Yates MD at [facility] MAIN OR   • THORACIC FACET JOINT INJECTION DIAGNOSTIC Right 11/2/2015    Performed by Bradley Lobo MD at [facility] MAIN OR   • THORACIC FACET JOINT INJECTION DIAGNOSTIC Left 10/20/2015    Performed by Sharyn Foster ]    UROGENITAL [x ] [x ]    EXTREMITIES [x ] [x ]    OTHER        [ x ] I have discussed the risks and benefits and alternatives with the patient/family. They understand and agree to proceed with plan of care.   [ x ] I have reviewed the History and Physi

## 2018-11-09 ENCOUNTER — TELEPHONE (OUTPATIENT)
Dept: SURGERY | Facility: CLINIC | Age: 43
End: 2018-11-09

## 2018-11-09 NOTE — TELEPHONE ENCOUNTER
Left message for patient, confirmed procedure date of 11/15/18 at 9 am to be checked in at outpatient registration. Patient called pre-procedure line and understood instructions.  Patient instructed to call office if there are additional questions after lis

## 2018-11-10 DIAGNOSIS — L08.9 SKIN INFECTION: ICD-10-CM

## 2018-11-15 ENCOUNTER — HOSPITAL ENCOUNTER (OUTPATIENT)
Facility: HOSPITAL | Age: 43
Setting detail: HOSPITAL OUTPATIENT SURGERY
Discharge: HOME | End: 2018-11-15
Attending: ANESTHESIOLOGY | Admitting: ANESTHESIOLOGY
Payer: MEDICARE

## 2018-11-15 ENCOUNTER — APPOINTMENT (OUTPATIENT)
Dept: GENERAL RADIOLOGY | Facility: HOSPITAL | Age: 43
End: 2018-11-15
Attending: ANESTHESIOLOGY
Payer: MEDICARE

## 2018-11-15 VITALS
SYSTOLIC BLOOD PRESSURE: 136 MMHG | HEART RATE: 91 BPM | DIASTOLIC BLOOD PRESSURE: 77 MMHG | TEMPERATURE: 98 F | RESPIRATION RATE: 20 BRPM | OXYGEN SATURATION: 96 %

## 2018-11-15 DIAGNOSIS — M47.816 LUMBAR FACET ARTHROPATHY: ICD-10-CM

## 2018-11-15 PROCEDURE — 99152 MOD SED SAME PHYS/QHP 5/>YRS: CPT | Performed by: ANESTHESIOLOGY

## 2018-11-15 PROCEDURE — 3E0T3TZ INTRODUCTION OF DESTRUCTIVE AGENT INTO PERIPHERAL NERVES AND PLEXI, PERCUTANEOUS APPROACH: ICD-10-PCS | Performed by: ANESTHESIOLOGY

## 2018-11-15 RX ORDER — SODIUM CHLORIDE, SODIUM LACTATE, POTASSIUM CHLORIDE, CALCIUM CHLORIDE 600; 310; 30; 20 MG/100ML; MG/100ML; MG/100ML; MG/100ML
100 INJECTION, SOLUTION INTRAVENOUS CONTINUOUS
Status: DISCONTINUED | OUTPATIENT
Start: 2018-11-15 | End: 2018-11-15

## 2018-11-15 RX ORDER — ONDANSETRON 2 MG/ML
4 INJECTION INTRAMUSCULAR; INTRAVENOUS ONCE AS NEEDED
Status: DISCONTINUED | OUTPATIENT
Start: 2018-11-15 | End: 2018-11-15

## 2018-11-15 RX ORDER — ONDANSETRON 2 MG/ML
4 INJECTION INTRAMUSCULAR; INTRAVENOUS ONCE AS NEEDED
Status: DISCONTINUED | OUTPATIENT
Start: 2018-11-15 | End: 2018-11-15 | Stop reason: HOSPADM

## 2018-11-15 RX ORDER — DIPHENHYDRAMINE HYDROCHLORIDE 50 MG/ML
50 INJECTION INTRAMUSCULAR; INTRAVENOUS ONCE AS NEEDED
Status: DISCONTINUED | OUTPATIENT
Start: 2018-11-15 | End: 2018-11-15

## 2018-11-15 RX ORDER — LIDOCAINE HYDROCHLORIDE 10 MG/ML
INJECTION, SOLUTION EPIDURAL; INFILTRATION; INTRACAUDAL; PERINEURAL AS NEEDED
Status: DISCONTINUED | OUTPATIENT
Start: 2018-11-15 | End: 2018-11-15 | Stop reason: HOSPADM

## 2018-11-15 RX ORDER — MIDAZOLAM HYDROCHLORIDE 1 MG/ML
INJECTION INTRAMUSCULAR; INTRAVENOUS AS NEEDED
Status: DISCONTINUED | OUTPATIENT
Start: 2018-11-15 | End: 2018-11-15 | Stop reason: HOSPADM

## 2018-11-15 RX ORDER — METHYLPREDNISOLONE ACETATE 40 MG/ML
INJECTION, SUSPENSION INTRA-ARTICULAR; INTRALESIONAL; INTRAMUSCULAR; SOFT TISSUE AS NEEDED
Status: DISCONTINUED | OUTPATIENT
Start: 2018-11-15 | End: 2018-11-15 | Stop reason: HOSPADM

## 2018-11-15 NOTE — OPERATIVE REPORT
[facility]  Operative Report  11/15/2018     Sonia King Patient Status:  Hospital Outpatient Surgery    1975 MRN [id number]   [facility] No att. providers found   [facility] Day # 0 PCP Do anesthetized via 25-gauge 1.5\" needle with approximately 2 mL of 1% lidocaine for local anesthesia.   Under fluoroscopic guidance, a 22-gauge, 100-mm SMK needle was advanced to the junction of the superior aspect of the right L3 transverse process and the

## 2018-11-19 DIAGNOSIS — I10 ESSENTIAL HYPERTENSION: ICD-10-CM

## 2018-11-20 RX ORDER — HYDRALAZINE HYDROCHLORIDE 25 MG/1
50 TABLET, FILM COATED ORAL 3 TIMES DAILY
Qty: 540 TABLET | Refills: 1 | Status: SHIPPED | OUTPATIENT
Start: 2018-11-20 | End: 2018-12-20 | Stop reason: DRUGHIGH

## 2018-11-27 ENCOUNTER — PATIENT OUTREACH (OUTPATIENT)
Dept: CASE MANAGEMENT | Age: 43
End: 2018-11-27

## 2018-11-27 DIAGNOSIS — E78.2 MIXED HYPERLIPIDEMIA: ICD-10-CM

## 2018-11-27 DIAGNOSIS — F41.1 GENERALIZED ANXIETY DISORDER: ICD-10-CM

## 2018-11-27 DIAGNOSIS — J45.50 UNCOMPLICATED SEVERE PERSISTENT ASTHMA: ICD-10-CM

## 2018-11-27 DIAGNOSIS — I10 ESSENTIAL HYPERTENSION: ICD-10-CM

## 2018-11-27 NOTE — PROGRESS NOTES
11/27/2018  Spoke to Brielle Jacob for CCM. Updates to patient care team/comments: n/a. Patient reported changes in medications: no changes. Med Adherence  Comment: pt reports taking all medications as prescribed. Health Maintenance:    Your Appoi appointment with weight loss clinic referral provided by [facility]. Encouraged regular exercise at least 3 times per week for 30 minutes such as walking.     Discussed pt diet and nutrition.   Encouraged pt to eat a lowfat and low carb diet for improved

## 2018-11-29 ENCOUNTER — APPOINTMENT (OUTPATIENT)
Dept: GENERAL RADIOLOGY | Facility: HOSPITAL | Age: 43
End: 2018-11-29
Attending: ANESTHESIOLOGY
Payer: MEDICARE

## 2018-11-29 ENCOUNTER — HOSPITAL ENCOUNTER (OUTPATIENT)
Facility: HOSPITAL | Age: 43
Setting detail: HOSPITAL OUTPATIENT SURGERY
Discharge: HOME | End: 2018-11-29
Attending: ANESTHESIOLOGY | Admitting: ANESTHESIOLOGY
Payer: MEDICARE

## 2018-11-29 VITALS
HEART RATE: 94 BPM | DIASTOLIC BLOOD PRESSURE: 101 MMHG | RESPIRATION RATE: 16 BRPM | SYSTOLIC BLOOD PRESSURE: 156 MMHG | TEMPERATURE: 98 F | OXYGEN SATURATION: 97 %

## 2018-11-29 DIAGNOSIS — M47.819 SPONDYLOSIS WITHOUT MYELOPATHY: ICD-10-CM

## 2018-11-29 PROCEDURE — 99152 MOD SED SAME PHYS/QHP 5/>YRS: CPT | Performed by: ANESTHESIOLOGY

## 2018-11-29 PROCEDURE — 3E0T3TZ INTRODUCTION OF DESTRUCTIVE AGENT INTO PERIPHERAL NERVES AND PLEXI, PERCUTANEOUS APPROACH: ICD-10-PCS | Performed by: ANESTHESIOLOGY

## 2018-11-29 RX ORDER — SODIUM CHLORIDE, SODIUM LACTATE, POTASSIUM CHLORIDE, CALCIUM CHLORIDE 600; 310; 30; 20 MG/100ML; MG/100ML; MG/100ML; MG/100ML
100 INJECTION, SOLUTION INTRAVENOUS CONTINUOUS
Status: DISCONTINUED | OUTPATIENT
Start: 2018-11-29 | End: 2018-11-29

## 2018-11-29 RX ORDER — METHYLPREDNISOLONE ACETATE 40 MG/ML
INJECTION, SUSPENSION INTRA-ARTICULAR; INTRALESIONAL; INTRAMUSCULAR; SOFT TISSUE AS NEEDED
Status: DISCONTINUED | OUTPATIENT
Start: 2018-11-29 | End: 2018-11-29 | Stop reason: HOSPADM

## 2018-11-29 RX ORDER — DIPHENHYDRAMINE HYDROCHLORIDE 50 MG/ML
50 INJECTION INTRAMUSCULAR; INTRAVENOUS ONCE AS NEEDED
Status: DISCONTINUED | OUTPATIENT
Start: 2018-11-29 | End: 2018-11-29

## 2018-11-29 RX ORDER — ONDANSETRON 2 MG/ML
4 INJECTION INTRAMUSCULAR; INTRAVENOUS ONCE AS NEEDED
Status: DISCONTINUED | OUTPATIENT
Start: 2018-11-29 | End: 2018-11-29

## 2018-11-29 RX ORDER — MIDAZOLAM HYDROCHLORIDE 1 MG/ML
INJECTION INTRAMUSCULAR; INTRAVENOUS AS NEEDED
Status: DISCONTINUED | OUTPATIENT
Start: 2018-11-29 | End: 2018-11-29 | Stop reason: HOSPADM

## 2018-11-29 RX ORDER — LIDOCAINE HYDROCHLORIDE 10 MG/ML
INJECTION, SOLUTION EPIDURAL; INFILTRATION; INTRACAUDAL; PERINEURAL AS NEEDED
Status: DISCONTINUED | OUTPATIENT
Start: 2018-11-29 | End: 2018-11-29 | Stop reason: HOSPADM

## 2018-11-29 RX ORDER — ONDANSETRON 2 MG/ML
4 INJECTION INTRAMUSCULAR; INTRAVENOUS ONCE AS NEEDED
Status: DISCONTINUED | OUTPATIENT
Start: 2018-11-29 | End: 2018-11-29 | Stop reason: HOSPADM

## 2018-11-29 NOTE — OPERATIVE REPORT
[facility]  Operative Report  2018     Shivam Medrano Patient Status:  Hospital Outpatient Surgery    1975 MRN [id number]   [facility] No att. providers found   [facility] Day # 0 PCP Do 22-gauge, 100-mm SMK needle was advanced into the inferior pole of the left sacroiliac joint. The second needle was placed into the joint but approximately 1 cm cephalad to the first needle.  The subsequent needles were place in this \"leap frog\" fashion u

## 2018-11-29 NOTE — H&P
History & Physical Examination    Patient Name: Amalia oGnzalez  MRN: [id number]  [facility]: [id number]  YOB: 1975    Pre-Operative Diagnosis:  Spondylosis without myelopathy [M47.819]    Present Illness: A 44year old female with low back 11/1/2018   Montelukast Sodium (SINGULAIR) 10 MG Oral Tab TAKE 1 TABLET BY MOUTH NIGHTLY (Patient taking differently: TAKE 1 TABLET BY MOUTH morning) Disp: 90 tablet Rfl: 1 Taking       Current Facility-Administered Medications:  lactated ringers infusion STIMULATOR GENERATOR   • OTHER SURGICAL HISTORY      MALROTATION   • RADIOFREQUENCY ABLATION OF SACROILIAC JOINT Right 11/8/2018    Performed by Juany Hinojosa MD at [facility] MAIN OR   • [address] Left 5/8/2018    Performed b MAIN OR   • SPINAL CORD STIMULATION TRIAL N/A 4/6/2015    Performed by Lugenia Cowden, MD at [address] [place] [address] N/A 4/27/2016    Performed by Ingrid Gutierrez MD at [facility] MAIN OR   • [address] [facility] N/ Comment: rare      SYSTEM Check if Review is Normal Check if Physical Exam is Normal If not normal, please explain:   HEENT [x ] [x ]    NECK & BACK [ ] [ ] Tenderness to palpation left sacroiliac joint.    HEART [x ] [x ]    LUNGS [x ] [x ]    ABDOMEN [x ]

## 2018-11-30 ENCOUNTER — TELEPHONE (OUTPATIENT)
Dept: SURGERY | Facility: CLINIC | Age: 43
End: 2018-11-30

## 2018-11-30 PROCEDURE — 99490 CHRNC CARE MGMT STAFF 1ST 20: CPT

## 2018-11-30 NOTE — TELEPHONE ENCOUNTER
Spoke to patient, confirmed procedure date of 12/6/18 with Dr Larry Moura and to be checked in at outpatient registration at 9:00 am. Patient instructed to call pre-procedure line before procedure at [phone].  Patient instructed to call office if there are ad

## 2018-12-03 ENCOUNTER — TELEPHONE (OUTPATIENT)
Dept: SURGERY | Facility: CLINIC | Age: 43
End: 2018-12-03

## 2018-12-03 NOTE — TELEPHONE ENCOUNTER
Spoke with patient. She stated she is scheduled for Left RFA Lumbar on 12/6/18. Patient stated she has a small square bruise from her previous RFA SI which is not infected and patient is not on antibiotics.      Discussed with Ankit MATTHEWS- patient ok to go a

## 2018-12-06 ENCOUNTER — HOSPITAL ENCOUNTER (OUTPATIENT)
Facility: HOSPITAL | Age: 43
Setting detail: HOSPITAL OUTPATIENT SURGERY
Discharge: HOME | End: 2018-12-06
Attending: ANESTHESIOLOGY | Admitting: ANESTHESIOLOGY
Payer: MEDICARE

## 2018-12-06 ENCOUNTER — APPOINTMENT (OUTPATIENT)
Dept: GENERAL RADIOLOGY | Facility: HOSPITAL | Age: 43
End: 2018-12-06
Attending: ANESTHESIOLOGY
Payer: MEDICARE

## 2018-12-06 VITALS
TEMPERATURE: 98 F | DIASTOLIC BLOOD PRESSURE: 101 MMHG | HEART RATE: 93 BPM | RESPIRATION RATE: 17 BRPM | OXYGEN SATURATION: 95 % | SYSTOLIC BLOOD PRESSURE: 150 MMHG

## 2018-12-06 DIAGNOSIS — M47.816 LUMBAR FACET ARTHROPATHY: ICD-10-CM

## 2018-12-06 PROCEDURE — BR161ZZ FLUOROSCOPY OF LUMBAR FACET JOINT(S) USING LOW OSMOLAR CONTRAST: ICD-10-PCS | Performed by: ANESTHESIOLOGY

## 2018-12-06 PROCEDURE — 3E0T3TZ INTRODUCTION OF DESTRUCTIVE AGENT INTO PERIPHERAL NERVES AND PLEXI, PERCUTANEOUS APPROACH: ICD-10-PCS | Performed by: ANESTHESIOLOGY

## 2018-12-06 PROCEDURE — 99152 MOD SED SAME PHYS/QHP 5/>YRS: CPT | Performed by: ANESTHESIOLOGY

## 2018-12-06 RX ORDER — MIDAZOLAM HYDROCHLORIDE 1 MG/ML
INJECTION INTRAMUSCULAR; INTRAVENOUS AS NEEDED
Status: DISCONTINUED | OUTPATIENT
Start: 2018-12-06 | End: 2018-12-06 | Stop reason: HOSPADM

## 2018-12-06 RX ORDER — LIDOCAINE HYDROCHLORIDE 10 MG/ML
INJECTION, SOLUTION EPIDURAL; INFILTRATION; INTRACAUDAL; PERINEURAL AS NEEDED
Status: DISCONTINUED | OUTPATIENT
Start: 2018-12-06 | End: 2018-12-06 | Stop reason: HOSPADM

## 2018-12-06 RX ORDER — METHYLPREDNISOLONE ACETATE 40 MG/ML
INJECTION, SUSPENSION INTRA-ARTICULAR; INTRALESIONAL; INTRAMUSCULAR; SOFT TISSUE AS NEEDED
Status: DISCONTINUED | OUTPATIENT
Start: 2018-12-06 | End: 2018-12-06 | Stop reason: HOSPADM

## 2018-12-06 RX ORDER — DIPHENHYDRAMINE HYDROCHLORIDE 50 MG/ML
50 INJECTION INTRAMUSCULAR; INTRAVENOUS ONCE AS NEEDED
Status: DISCONTINUED | OUTPATIENT
Start: 2018-12-06 | End: 2018-12-06 | Stop reason: HOSPADM

## 2018-12-06 RX ORDER — ONDANSETRON 2 MG/ML
4 INJECTION INTRAMUSCULAR; INTRAVENOUS ONCE AS NEEDED
Status: DISCONTINUED | OUTPATIENT
Start: 2018-12-06 | End: 2018-12-06 | Stop reason: HOSPADM

## 2018-12-06 RX ORDER — SODIUM CHLORIDE, SODIUM LACTATE, POTASSIUM CHLORIDE, CALCIUM CHLORIDE 600; 310; 30; 20 MG/100ML; MG/100ML; MG/100ML; MG/100ML
100 INJECTION, SOLUTION INTRAVENOUS CONTINUOUS
Status: DISCONTINUED | OUTPATIENT
Start: 2018-12-06 | End: 2018-12-06

## 2018-12-06 NOTE — OPERATIVE REPORT
[facility]  Operative Report  2018     Geraldine Oliva Patient Status:  Hospital Outpatient Surgery    1975 MRN [id number]   [facility] No att. providers found   [facility] Day # 0 PCP Jim approximately 2 mL of 1% lidocaine for local anesthesia.   Under fluoroscopic guidance, a 22-gauge, 100-mm SMK needle was advanced to the junction of the superior aspect of the left L3 transverse process and the lateral aspect of the same level superior art

## 2018-12-06 NOTE — H&P
History & Physical Examination    Patient Name: Alyx Bass  MRN: [id number]  CSN: [id number]  YOB: 1975    Pre-Operative Diagnosis:  [facility] [M46.96]    Present Illness: A 44year old female with low back a month at Unknown time   Montelukast Sodium (SINGULAIR) 10 MG Oral Tab TAKE 1 TABLET BY MOUTH NIGHTLY (Patient taking differently: TAKE 1 TABLET BY MOUTH morning) Disp: 90 tablet Rfl: 1 Taking       Current Facility-Administered Medications:  lactated rin CORD STIMULATOR GENERATOR   • OTHER SURGICAL HISTORY      MALROTATION   • RADIOFREQUENCY ABLATION OF SACROILIAC JOINT Left 11/29/2018    Performed by Becky Restrepo MD at [facility] MAIN OR   • [address] Right 11/8/2018    Perf KIDNEY STONE      x3   • SACROILIAC JOINT INJECTION BILATERAL Bilateral 1/9/2017    Performed by Joselin Soto MD at [facility] MAIN OR   • SPINAL CORD STIMULATION TRIAL N/A 4/6/2015    Performed by Joselin Soto MD at [address] [place] [address] Use      Smoking status: Never Smoker      Smokeless tobacco: Never Used    Alcohol use: No      Alcohol/week: 0.0 oz      Comment: rare      SYSTEM Check if Review is Normal Check if Physical Exam is Normal If not normal, please explain:   HEENT [x ] [x ]

## 2018-12-06 NOTE — OR NURSING
Jose Antonio-Rep with [employer] testing pts nerve stimulator and verified that it is turned off.

## 2018-12-13 ENCOUNTER — TELEPHONE (OUTPATIENT)
Dept: INTERNAL MEDICINE CLINIC | Facility: CLINIC | Age: 43
End: 2018-12-13

## 2018-12-13 DIAGNOSIS — J01.90 ACUTE NON-RECURRENT SINUSITIS, UNSPECIFIED LOCATION: Primary | ICD-10-CM

## 2018-12-13 RX ORDER — AZITHROMYCIN 250 MG/1
TABLET, FILM COATED ORAL
Qty: 6 TABLET | Refills: 0 | Status: SHIPPED | OUTPATIENT
Start: 2018-12-13 | End: 2018-12-20

## 2018-12-13 RX ORDER — PREDNISONE 20 MG/1
40 TABLET ORAL DAILY
Qty: 14 TABLET | Refills: 0 | Status: SHIPPED | OUTPATIENT
Start: 2018-12-13 | End: 2018-12-20

## 2018-12-13 NOTE — TELEPHONE ENCOUNTER
Wants to know if she can have an antibiotic prescribed for sinus infection and sore throat. Patient is losing her voice and also wants prednisone prescribed. If she has to come in the best she can do is 11am her son is in a cast and if hard to move around.

## 2018-12-13 NOTE — TELEPHONE ENCOUNTER
The pt c/o sore throat, increased facial pressure, rhinorrhea and cough since yesterday. The pt also reports some episodes of SOB. The pt is hard to hear due to the pt's strained voice. The pt denies any chest congestion or fever.      The pt is rubio

## 2018-12-20 ENCOUNTER — APPOINTMENT (OUTPATIENT)
Dept: LAB | Age: 43
End: 2018-12-20
Attending: NURSE PRACTITIONER
Payer: MEDICARE

## 2018-12-20 ENCOUNTER — OFFICE VISIT (OUTPATIENT)
Dept: INTERNAL MEDICINE CLINIC | Facility: CLINIC | Age: 43
End: 2018-12-20
Payer: MEDICARE

## 2018-12-20 VITALS
OXYGEN SATURATION: 96 % | TEMPERATURE: 98 F | BODY MASS INDEX: 42.85 KG/M2 | HEART RATE: 100 BPM | DIASTOLIC BLOOD PRESSURE: 96 MMHG | HEIGHT: 64 IN | SYSTOLIC BLOOD PRESSURE: 156 MMHG | WEIGHT: 251 LBS

## 2018-12-20 DIAGNOSIS — I10 HYPERTENSION, UNSPECIFIED TYPE: Primary | ICD-10-CM

## 2018-12-20 DIAGNOSIS — B96.89 ACUTE BACTERIAL RHINOSINUSITIS: ICD-10-CM

## 2018-12-20 DIAGNOSIS — J01.90 ACUTE BACTERIAL RHINOSINUSITIS: ICD-10-CM

## 2018-12-20 DIAGNOSIS — I10 HYPERTENSION, UNSPECIFIED TYPE: ICD-10-CM

## 2018-12-20 PROCEDURE — 99214 OFFICE O/P EST MOD 30 MIN: CPT | Performed by: NURSE PRACTITIONER

## 2018-12-20 PROCEDURE — 36415 COLL VENOUS BLD VENIPUNCTURE: CPT

## 2018-12-20 PROCEDURE — 82088 ASSAY OF ALDOSTERONE: CPT

## 2018-12-20 PROCEDURE — 84244 ASSAY OF RENIN: CPT

## 2018-12-20 RX ORDER — AMOXICILLIN AND CLAVULANATE POTASSIUM 875; 125 MG/1; MG/1
1 TABLET, FILM COATED ORAL 2 TIMES DAILY
Qty: 20 TABLET | Refills: 0 | Status: SHIPPED | OUTPATIENT
Start: 2018-12-20 | End: 2018-12-30

## 2018-12-20 RX ORDER — HYDRALAZINE HYDROCHLORIDE 25 MG/1
75 TABLET, FILM COATED ORAL 3 TIMES DAILY
Qty: 270 TABLET | Refills: 1 | Status: SHIPPED | OUTPATIENT
Start: 2018-12-20 | End: 2019-05-24

## 2018-12-20 NOTE — PROGRESS NOTES
HPI:    Patient ID: Alan Epley is a 44year old female. Patient presents with:  Hypertension  Sinus Problem: completed abx and prednison    Hypertension   This is a chronic problem. The current episode started more than 1 year ago.  The pr • Depression    • Extrinsic asthma, unspecified    • Family history of congenital heart defect 8/29/2014    Father of baby with congenital heart defect. Plan fetal echo 22-24 weeks.     • GENITO-URINARY DISEASE     KIDNEY STONES   • Menorrhagia    • Annalise Martel SACROILIAC JOINT Right 8/29/2017    Performed by Kiera Hussein MD at [facility] MAIN OR   • [address] Left 12/6/2018    Performed by Kiera Hussein MD at [facility] MAIN OR   • [address] at [facility] MAIN OR   • THORACIC FACET JOINT INJECTION DIAGNOSTIC Left 10/20/2015    Performed by Sara Duran MD at [address]   • TRANSFORAMINAL LUMBAR EPIDURAL STEROID INJECTION MULTIPLE LEVEL Right 4/18/2017    Performed by Sara Duran MD at [facility] MA Yes          15-20 oz daily        Exercise: Yes          walking          Current Outpatient Medications:  Amoxicillin-Pot Clavulanate 875-125 MG Oral Tab Take 1 tablet by mouth 2 (two) times daily for 10 days.  Disp: 20 tablet Rfl: 0   hydrALAzine HCl 25 ASTHMA  Seafood                 HIVES  Shellfish               HIVES  Adhesive Tape           RASH  Dander                  OTHER (SEE COMMENTS)    Comment:Worsened asthma  Pollen                  HIVES    Lab Results   Component Value Date    GLU 95 12/02 ear canal normal.   Nose: Mucosal edema, rhinorrhea and sinus tenderness present. Right sinus exhibits frontal sinus tenderness and tenderness. Left sinus exhibits frontal sinus tenderness and tenderness.    Mouth/Throat: Uvula is midline and mucous membran

## 2018-12-26 ENCOUNTER — PATIENT OUTREACH (OUTPATIENT)
Dept: CASE MANAGEMENT | Age: 43
End: 2018-12-26

## 2018-12-26 DIAGNOSIS — J45.50 UNCOMPLICATED SEVERE PERSISTENT ASTHMA: ICD-10-CM

## 2018-12-26 DIAGNOSIS — E78.2 MIXED HYPERLIPIDEMIA: ICD-10-CM

## 2018-12-26 DIAGNOSIS — I10 ESSENTIAL HYPERTENSION: ICD-10-CM

## 2018-12-26 DIAGNOSIS — F41.1 GENERALIZED ANXIETY DISORDER: ICD-10-CM

## 2018-12-27 NOTE — PROGRESS NOTES
12/26/2018  Spoke to Madelyn for CCM. Updates to patient care team/comments: n/a. Patient reported changes in medications: no changes. Med Adherence  Comment: pt reports taking all medications as prescribed. Health Maintenance:    Your Appoi

## 2018-12-28 RX ORDER — AMLODIPINE BESYLATE 10 MG/1
TABLET ORAL
Qty: 90 TABLET | Refills: 0 | Status: SHIPPED | OUTPATIENT
Start: 2018-12-28 | End: 2019-02-27

## 2018-12-31 PROCEDURE — 99490 CHRNC CARE MGMT STAFF 1ST 20: CPT

## 2019-01-10 ENCOUNTER — OFFICE VISIT (OUTPATIENT)
Dept: INTERNAL MEDICINE CLINIC | Facility: CLINIC | Age: 44
End: 2019-01-10
Payer: MEDICARE

## 2019-01-10 VITALS
RESPIRATION RATE: 18 BRPM | OXYGEN SATURATION: 98 % | HEIGHT: 64 IN | WEIGHT: 255 LBS | DIASTOLIC BLOOD PRESSURE: 80 MMHG | BODY MASS INDEX: 43.54 KG/M2 | SYSTOLIC BLOOD PRESSURE: 128 MMHG | HEART RATE: 108 BPM | TEMPERATURE: 98 F

## 2019-01-10 DIAGNOSIS — Z13.29 SCREENING FOR ENDOCRINE, NUTRITIONAL, METABOLIC AND IMMUNITY DISORDER: ICD-10-CM

## 2019-01-10 DIAGNOSIS — Z13.0 SCREENING FOR ENDOCRINE, NUTRITIONAL, METABOLIC AND IMMUNITY DISORDER: ICD-10-CM

## 2019-01-10 DIAGNOSIS — Z13.89 SCREENING FOR GENITOURINARY CONDITION: ICD-10-CM

## 2019-01-10 DIAGNOSIS — Z13.1 SCREENING FOR DIABETES MELLITUS: ICD-10-CM

## 2019-01-10 DIAGNOSIS — E78.2 MIXED HYPERLIPIDEMIA: ICD-10-CM

## 2019-01-10 DIAGNOSIS — G89.29 CHRONIC BILATERAL LOW BACK PAIN WITHOUT SCIATICA: ICD-10-CM

## 2019-01-10 DIAGNOSIS — I10 ESSENTIAL HYPERTENSION: ICD-10-CM

## 2019-01-10 DIAGNOSIS — Z00.00 ANNUAL PHYSICAL EXAM: Primary | ICD-10-CM

## 2019-01-10 DIAGNOSIS — J45.41 MODERATE PERSISTENT ASTHMA WITH ACUTE EXACERBATION: ICD-10-CM

## 2019-01-10 DIAGNOSIS — M54.50 CHRONIC BILATERAL LOW BACK PAIN WITHOUT SCIATICA: ICD-10-CM

## 2019-01-10 DIAGNOSIS — Z13.228 SCREENING FOR ENDOCRINE, NUTRITIONAL, METABOLIC AND IMMUNITY DISORDER: ICD-10-CM

## 2019-01-10 DIAGNOSIS — Z13.21 SCREENING FOR ENDOCRINE, NUTRITIONAL, METABOLIC AND IMMUNITY DISORDER: ICD-10-CM

## 2019-01-10 PROCEDURE — 99214 OFFICE O/P EST MOD 30 MIN: CPT | Performed by: NURSE PRACTITIONER

## 2019-01-10 RX ORDER — HYDROCODONE BITARTRATE AND ACETAMINOPHEN 10; 325 MG/1; MG/1
1 TABLET ORAL EVERY 6 HOURS PRN
Qty: 30 TABLET | Refills: 0 | Status: SHIPPED | OUTPATIENT
Start: 2019-03-11 | End: 2019-01-21

## 2019-01-10 RX ORDER — AMOXICILLIN AND CLAVULANATE POTASSIUM 875; 125 MG/1; MG/1
1 TABLET, FILM COATED ORAL 2 TIMES DAILY
Qty: 20 TABLET | Refills: 0 | Status: SHIPPED | OUTPATIENT
Start: 2019-01-10 | End: 2019-01-20

## 2019-01-10 RX ORDER — HYDROCODONE BITARTRATE AND ACETAMINOPHEN 10; 325 MG/1; MG/1
1 TABLET ORAL EVERY 6 HOURS PRN
Qty: 30 TABLET | Refills: 0 | Status: SHIPPED | OUTPATIENT
Start: 2019-02-09 | End: 2019-01-21

## 2019-01-10 RX ORDER — HYDROCODONE BITARTRATE AND ACETAMINOPHEN 10; 325 MG/1; MG/1
1 TABLET ORAL EVERY 6 HOURS PRN
Qty: 30 TABLET | Refills: 0 | Status: SHIPPED | OUTPATIENT
Start: 2019-01-10 | End: 2019-01-21

## 2019-01-10 RX ORDER — BUDESONIDE AND FORMOTEROL FUMARATE DIHYDRATE 160; 4.5 UG/1; UG/1
1 AEROSOL RESPIRATORY (INHALATION) 2 TIMES DAILY
Qty: 1 INHALER | Refills: 0 | COMMUNITY
Start: 2019-01-10 | End: 2019-05-01 | Stop reason: ALTCHOICE

## 2019-01-10 RX ORDER — CODEINE PHOSPHATE AND GUAIFENESIN 10; 100 MG/5ML; MG/5ML
5 SOLUTION ORAL NIGHTLY PRN
Qty: 120 ML | Refills: 0 | Status: SHIPPED | OUTPATIENT
Start: 2019-01-10 | End: 2019-04-18 | Stop reason: ALTCHOICE

## 2019-01-10 NOTE — PROGRESS NOTES
CC: sinus congestion, pain management, uncontrolled asthma    HPI:   Concerns: Patient has been feeling well. She is using a new shira called coloring with numbers that is helping control her anxiety.  Compliant with blood pressure medication since last visit History:   Diagnosis Date   • Asthma    • Depression    • Extrinsic asthma, unspecified    • Family history of congenital heart defect 8/29/2014    Father of baby with congenital heart defect. Plan fetal echo 22-24 weeks.     • GENITO-URINARY DISEASE     [facility] MAIN OR   • RADIOFREQUENCY ABLATION OF SACROILIAC JOINT Right 8/29/2017    Performed by Malka Jeffers MD at [facility] MAIN OR   • [address] [facility] Left 12/6/2018    Performed by Malka Jeffers MD at [facility] MAIN OR   • Performed by Luanne Og MD at [facility] MAIN OR   • THORACIC FACET JOINT INJECTION DIAGNOSTIC Left 10/20/2015    Performed by Luanne Og MD at [facility] MAIN OR   • TRANSFORAMINAL LUMBAR EPIDURAL STEROID INJECTION MULTIPLE LEVEL Right 4/18/2017    Perfo Nausea. Disp: 20 tablet Rfl: 2   HYDROcodone-acetaminophen  MG Oral Tab TK ONE T PO Q 6 H PRN P. Disp: 90 tablet Rfl: 0   ALPRAZolam 0.5 MG Oral Tab Take 1 tablet (0.5 mg total) by mouth 3 (three) times daily as needed for Anxiety.  Disp: 90 tablet Rf exam: No hemorrhages, A/V nicking, exudates or papilledema. ENT: TM's clear, frontal sinus tenderness, + oropharyngeal exudates, no tonsillar hypertrophy    Neck: Normal range of motion. No thyromegaly present.    Cardiovascular: Normal rate, regular rhyth as well as a well-rounded diet. Her 5 year prevention plan includes: annual physical, labs, diet and exercise  Patient/Caregiver Education:  Patient/Caregiver Education: There are no barriers to learning. Medical education done.   Outcome: Patient verbal

## 2019-01-10 NOTE — PATIENT INSTRUCTIONS
Have labs completed fasting in the next week  Call to schedule appointment with pulmonary for uncontrolled asthma  Continue same medications

## 2019-01-21 ENCOUNTER — PATIENT OUTREACH (OUTPATIENT)
Dept: CASE MANAGEMENT | Age: 44
End: 2019-01-21

## 2019-01-21 DIAGNOSIS — E78.2 MIXED HYPERLIPIDEMIA: ICD-10-CM

## 2019-01-21 DIAGNOSIS — I10 ESSENTIAL HYPERTENSION: ICD-10-CM

## 2019-01-21 DIAGNOSIS — J45.50 UNCOMPLICATED SEVERE PERSISTENT ASTHMA: ICD-10-CM

## 2019-01-21 DIAGNOSIS — F41.1 GENERALIZED ANXIETY DISORDER: ICD-10-CM

## 2019-01-21 RX ORDER — ALBUTEROL SULFATE 2.5 MG/3ML
2.5 SOLUTION RESPIRATORY (INHALATION) EVERY 6 HOURS PRN
Refills: 0 | COMMUNITY
Start: 2018-12-25

## 2019-01-21 NOTE — PROGRESS NOTES
1/21/2019  Spoke to Madelyn for CCM. Updates to patient care team/comments: yes,   Patient reported changes in medications: yes, pt no longer on antibiotic medication list updated.     Med Adherence  Comment: pt reports taking all medications as presc both (Silvestre/[place]) continue acting out and giving issues in school Rickey a little more then AKASH. They are both in counseling and pt reports it helps. Seeing counselor several times a week.     Rickey just turned 4years old and is still in 4-4 year old cloth

## 2019-01-31 PROCEDURE — 99490 CHRNC CARE MGMT STAFF 1ST 20: CPT

## 2019-02-01 ENCOUNTER — TELEPHONE (OUTPATIENT)
Dept: INTERNAL MEDICINE CLINIC | Facility: CLINIC | Age: 44
End: 2019-02-01

## 2019-02-05 DIAGNOSIS — J45.50 UNCOMPLICATED SEVERE PERSISTENT ASTHMA: ICD-10-CM

## 2019-02-05 RX ORDER — ALBUTEROL SULFATE 90 UG/1
AEROSOL, METERED RESPIRATORY (INHALATION)
Qty: 18 G | Refills: 5 | Status: SHIPPED | OUTPATIENT
Start: 2019-02-05 | End: 2019-03-19 | Stop reason: ALTCHOICE

## 2019-02-26 ENCOUNTER — PATIENT OUTREACH (OUTPATIENT)
Dept: CASE MANAGEMENT | Age: 44
End: 2019-02-26

## 2019-02-26 NOTE — PROGRESS NOTES
Attempted to contact pt no answer left detailed message for pt to call back.    CLINICAL STAFF TIME: 2/1/19, 2/5/19 ENCOUNTER 5 MIN    Total time spent with patient including chart review: 2  Time spent with patient this month: 7    Total time spent with co

## 2019-02-27 DIAGNOSIS — I10 ESSENTIAL HYPERTENSION: Primary | ICD-10-CM

## 2019-02-28 RX ORDER — AMLODIPINE BESYLATE 10 MG/1
TABLET ORAL
Qty: 90 TABLET | Refills: 0 | Status: SHIPPED | OUTPATIENT
Start: 2019-02-28 | End: 2019-09-07

## 2019-03-07 ENCOUNTER — PATIENT OUTREACH (OUTPATIENT)
Dept: CASE MANAGEMENT | Age: 44
End: 2019-03-07

## 2019-03-13 DIAGNOSIS — G89.29 CHRONIC BILATERAL LOW BACK PAIN WITHOUT SCIATICA: ICD-10-CM

## 2019-03-13 DIAGNOSIS — F41.1 GENERALIZED ANXIETY DISORDER: ICD-10-CM

## 2019-03-13 DIAGNOSIS — M54.50 CHRONIC BILATERAL LOW BACK PAIN WITHOUT SCIATICA: ICD-10-CM

## 2019-03-14 RX ORDER — DULOXETIN HYDROCHLORIDE 60 MG/1
CAPSULE, DELAYED RELEASE ORAL
Qty: 90 CAPSULE | Refills: 1 | Status: SHIPPED | OUTPATIENT
Start: 2019-03-14 | End: 2019-05-24

## 2019-03-19 ENCOUNTER — TELEPHONE (OUTPATIENT)
Dept: INTERNAL MEDICINE CLINIC | Facility: CLINIC | Age: 44
End: 2019-03-19

## 2019-03-19 DIAGNOSIS — J45.50 UNCOMPLICATED SEVERE PERSISTENT ASTHMA: ICD-10-CM

## 2019-03-19 NOTE — TELEPHONE ENCOUNTER
Patient called and stated Cyr[facility] should fax over another request for her Ventolin. They do not cover generic, and will need the brand name. They do not give 90 days rather by 15 days.

## 2019-03-25 ENCOUNTER — PATIENT OUTREACH (OUTPATIENT)
Dept: CASE MANAGEMENT | Age: 44
End: 2019-03-25

## 2019-03-25 NOTE — PROGRESS NOTES
Attempted to contact pt no answer left detailed message for pt to call back.    Total time spent with patient including chart review: 2  Time spent with patient this month: 8    Total time spent with communication and chart review this month to date: 8 Statement Selected

## 2019-03-28 ENCOUNTER — PATIENT OUTREACH (OUTPATIENT)
Dept: CASE MANAGEMENT | Age: 44
End: 2019-03-28

## 2019-03-28 DIAGNOSIS — J45.50 UNCOMPLICATED SEVERE PERSISTENT ASTHMA: ICD-10-CM

## 2019-03-28 DIAGNOSIS — E78.2 MIXED HYPERLIPIDEMIA: ICD-10-CM

## 2019-03-28 DIAGNOSIS — I10 ESSENTIAL HYPERTENSION: ICD-10-CM

## 2019-03-28 DIAGNOSIS — F41.1 GENERALIZED ANXIETY DISORDER: ICD-10-CM

## 2019-03-28 NOTE — PROGRESS NOTES
3/28/2019  Spoke to Shlomo Pena for CCM. Updates to patient care team/comments: n/a. Patient reported changes in medications: no changes. Med Adherence  Comment: pt reports taking all medications as prescribed. Health Maintenance:    Your Appoin her 3 boys and chores around the house. · Active goal from previous outreach: work on diet for better weight management.      Discussed the importance of diet and nutrition along with exercise for added benefits and weight management.                 LINDSEY

## 2019-03-31 PROCEDURE — 99490 CHRNC CARE MGMT STAFF 1ST 20: CPT

## 2019-04-18 ENCOUNTER — PATIENT OUTREACH (OUTPATIENT)
Dept: CASE MANAGEMENT | Age: 44
End: 2019-04-18

## 2019-04-18 RX ORDER — METRONIDAZOLE 500 MG/1
500 TABLET ORAL 2 TIMES DAILY
COMMUNITY
End: 2019-05-01 | Stop reason: ALTCHOICE

## 2019-04-18 RX ORDER — NAPROXEN 500 MG/1
500 TABLET ORAL 2 TIMES DAILY WITH MEALS
COMMUNITY
End: 2019-09-17 | Stop reason: ALTCHOICE

## 2019-04-18 RX ORDER — CODEINE PHOSPHATE AND GUAIFENESIN 10; 100 MG/5ML; MG/5ML
5 SOLUTION ORAL EVERY 4 HOURS PRN
COMMUNITY
End: 2019-09-07 | Stop reason: ALTCHOICE

## 2019-04-18 RX ORDER — LEVOFLOXACIN 750 MG/1
750 TABLET ORAL DAILY
COMMUNITY
End: 2019-05-01 | Stop reason: ALTCHOICE

## 2019-04-18 RX ORDER — OSELTAMIVIR PHOSPHATE 75 MG/1
75 CAPSULE ORAL 2 TIMES DAILY
COMMUNITY
End: 2019-05-01 | Stop reason: ALTCHOICE

## 2019-04-18 NOTE — PROGRESS NOTES
Spoke with pt for CCM follow up. Pt states she started feeling sick Saturday so on Sunday she went to [facility] and tested postive for Influenza A. States she was released last night.   Doing breathing treaments every six hours discharged with Prednison

## 2019-04-29 ENCOUNTER — PATIENT OUTREACH (OUTPATIENT)
Dept: CASE MANAGEMENT | Age: 44
End: 2019-04-29

## 2019-04-29 ENCOUNTER — MED REC SCAN ONLY (OUTPATIENT)
Dept: INTERNAL MEDICINE CLINIC | Facility: CLINIC | Age: 44
End: 2019-04-29

## 2019-04-29 DIAGNOSIS — E78.2 MIXED HYPERLIPIDEMIA: ICD-10-CM

## 2019-04-29 DIAGNOSIS — J45.50 UNCOMPLICATED SEVERE PERSISTENT ASTHMA: ICD-10-CM

## 2019-04-29 DIAGNOSIS — I10 ESSENTIAL HYPERTENSION: ICD-10-CM

## 2019-04-29 DIAGNOSIS — F41.1 GENERALIZED ANXIETY DISORDER: ICD-10-CM

## 2019-04-29 NOTE — PROGRESS NOTES
Attempted to contact pt follow up and see if she'd like to reschedule appointment with Teresa Mott, no answer left detailed message for pt to call back.    Total time spent with patient including chart review: 3  Time spent with patient this month: 21    Total ti

## 2019-04-30 PROCEDURE — 99490 CHRNC CARE MGMT STAFF 1ST 20: CPT

## 2019-05-01 ENCOUNTER — OFFICE VISIT (OUTPATIENT)
Dept: INTERNAL MEDICINE CLINIC | Facility: CLINIC | Age: 44
End: 2019-05-01
Payer: MEDICARE

## 2019-05-01 ENCOUNTER — LAB ENCOUNTER (OUTPATIENT)
Dept: LAB | Age: 44
End: 2019-05-01
Attending: NURSE PRACTITIONER
Payer: MEDICARE

## 2019-05-01 VITALS
OXYGEN SATURATION: 98 % | BODY MASS INDEX: 42.51 KG/M2 | DIASTOLIC BLOOD PRESSURE: 104 MMHG | TEMPERATURE: 98 F | SYSTOLIC BLOOD PRESSURE: 150 MMHG | HEART RATE: 106 BPM | RESPIRATION RATE: 18 BRPM | WEIGHT: 249 LBS | HEIGHT: 64 IN

## 2019-05-01 DIAGNOSIS — Z13.228 SCREENING FOR ENDOCRINE, NUTRITIONAL, METABOLIC AND IMMUNITY DISORDER: ICD-10-CM

## 2019-05-01 DIAGNOSIS — I10 ESSENTIAL HYPERTENSION: ICD-10-CM

## 2019-05-01 DIAGNOSIS — Z13.21 SCREENING FOR ENDOCRINE, NUTRITIONAL, METABOLIC AND IMMUNITY DISORDER: ICD-10-CM

## 2019-05-01 DIAGNOSIS — J45.51 SEVERE PERSISTENT ASTHMA WITH ACUTE EXACERBATION: Primary | ICD-10-CM

## 2019-05-01 DIAGNOSIS — F41.1 GENERALIZED ANXIETY DISORDER: ICD-10-CM

## 2019-05-01 DIAGNOSIS — Z13.29 SCREENING FOR ENDOCRINE, NUTRITIONAL, METABOLIC AND IMMUNITY DISORDER: ICD-10-CM

## 2019-05-01 DIAGNOSIS — Z13.0 SCREENING FOR ENDOCRINE, NUTRITIONAL, METABOLIC AND IMMUNITY DISORDER: ICD-10-CM

## 2019-05-01 DIAGNOSIS — E78.2 MIXED HYPERLIPIDEMIA: ICD-10-CM

## 2019-05-01 DIAGNOSIS — Z13.89 SCREENING FOR GENITOURINARY CONDITION: ICD-10-CM

## 2019-05-01 DIAGNOSIS — J45.50 UNCOMPLICATED SEVERE PERSISTENT ASTHMA: ICD-10-CM

## 2019-05-01 PROCEDURE — 1111F DSCHRG MED/CURRENT MED MERGE: CPT | Performed by: PHYSICIAN ASSISTANT

## 2019-05-01 PROCEDURE — 99214 OFFICE O/P EST MOD 30 MIN: CPT | Performed by: PHYSICIAN ASSISTANT

## 2019-05-01 PROCEDURE — 85025 COMPLETE CBC W/AUTO DIFF WBC: CPT

## 2019-05-01 PROCEDURE — 84443 ASSAY THYROID STIM HORMONE: CPT

## 2019-05-01 PROCEDURE — 87077 CULTURE AEROBIC IDENTIFY: CPT

## 2019-05-01 PROCEDURE — 82306 VITAMIN D 25 HYDROXY: CPT

## 2019-05-01 PROCEDURE — 87186 SC STD MICRODIL/AGAR DIL: CPT

## 2019-05-01 PROCEDURE — 87086 URINE CULTURE/COLONY COUNT: CPT

## 2019-05-01 PROCEDURE — 80053 COMPREHEN METABOLIC PANEL: CPT

## 2019-05-01 PROCEDURE — 36415 COLL VENOUS BLD VENIPUNCTURE: CPT

## 2019-05-01 PROCEDURE — 81001 URINALYSIS AUTO W/SCOPE: CPT

## 2019-05-01 PROCEDURE — 80061 LIPID PANEL: CPT

## 2019-05-01 RX ORDER — PREDNISONE 20 MG/1
40 TABLET ORAL DAILY
Qty: 14 TABLET | Refills: 0 | Status: SHIPPED | OUTPATIENT
Start: 2019-05-01 | End: 2019-06-18

## 2019-05-01 RX ORDER — DULOXETIN HYDROCHLORIDE 30 MG/1
30 CAPSULE, DELAYED RELEASE ORAL DAILY
Qty: 90 CAPSULE | Refills: 1 | Status: SHIPPED | OUTPATIENT
Start: 2019-05-01 | End: 2019-05-24 | Stop reason: DRUGHIGH

## 2019-05-01 NOTE — PROGRESS NOTES
Gina Resendiz is a 38year old female. HPI:   Pt following up on discharge from [facility] for asthma exacerbation.  She was given breathing treatments, + influenza treated with tamiflu, was weaned off of oxygen and discharged home on predniso (ZOFRAN) 4 mg tablet Take 1 tablet (4 mg total) by mouth every 8 (eight) hours as needed for Nausea. Disp: 20 tablet Rfl: 2   HYDROcodone-acetaminophen  MG Oral Tab TK ONE T PO Q 6 H PRN P.  Disp: 90 tablet Rfl: 0   ALPRAZolam 0.5 MG Oral Tab Take 1 t well controlled while on prednisone. EXAM:   BP (!) 150/104   Pulse 106   Temp 97.8 °F (36.6 °C) (Oral)   Resp 18   Ht 64\"   Wt 249 lb   LMP 04/19/2019   SpO2 98%   Breastfeeding?  No   BMI 42.74 kg/m²   GENERAL: well developed, well nourished,in no ap

## 2019-05-01 NOTE — PATIENT INSTRUCTIONS
Increase cymbalta: take 90mg total daily  Stop metoprolol  We will call with further blood pressure instructions one your lab results are available

## 2019-05-02 ENCOUNTER — TELEPHONE (OUTPATIENT)
Dept: INTERNAL MEDICINE CLINIC | Facility: CLINIC | Age: 44
End: 2019-05-02

## 2019-05-02 DIAGNOSIS — E78.5 HYPERLIPIDEMIA, UNSPECIFIED HYPERLIPIDEMIA TYPE: ICD-10-CM

## 2019-05-02 DIAGNOSIS — N30.00 ACUTE CYSTITIS WITHOUT HEMATURIA: ICD-10-CM

## 2019-05-02 DIAGNOSIS — E55.9 VITAMIN D DEFICIENCY: Primary | ICD-10-CM

## 2019-05-02 DIAGNOSIS — I10 ESSENTIAL HYPERTENSION: ICD-10-CM

## 2019-05-02 RX ORDER — ERGOCALCIFEROL 1.25 MG/1
50000 CAPSULE ORAL WEEKLY
Qty: 12 CAPSULE | Refills: 0 | Status: SHIPPED | OUTPATIENT
Start: 2019-05-02 | End: 2019-09-07 | Stop reason: ALTCHOICE

## 2019-05-02 RX ORDER — ATORVASTATIN CALCIUM 20 MG/1
20 TABLET, FILM COATED ORAL NIGHTLY
Qty: 30 TABLET | Refills: 3 | Status: SHIPPED | OUTPATIENT
Start: 2019-05-02 | End: 2019-09-07

## 2019-05-02 RX ORDER — CHLORTHALIDONE 25 MG/1
25 TABLET ORAL
Qty: 30 TABLET | Refills: 0 | COMMUNITY
Start: 2019-05-02 | End: 2019-09-07

## 2019-05-02 RX ORDER — CEPHALEXIN 500 MG/1
500 CAPSULE ORAL 3 TIMES DAILY
Qty: 15 CAPSULE | Refills: 0 | Status: SHIPPED | OUTPATIENT
Start: 2019-05-02 | End: 2019-05-07

## 2019-05-02 NOTE — TELEPHONE ENCOUNTER
----- Message from Victor Apley, PA-C sent at 5/2/2019  7:36 AM CDT -----  Please inform pt: potassium wnl, increase chlorthalidone to 1 tab (25mg) daily and monitor BP.  Report readings in 1 week (before next follow-up)  UA + UTI: treat with keflex 50

## 2019-05-02 NOTE — TELEPHONE ENCOUNTER
The pt is aware of the results. Rx sent to retial and the lab orders are pending. The pt will FU with the office in 1 week with BP readings.

## 2019-05-07 ENCOUNTER — PATIENT OUTREACH (OUTPATIENT)
Dept: CASE MANAGEMENT | Age: 44
End: 2019-05-07

## 2019-05-07 DIAGNOSIS — J45.50 UNCOMPLICATED SEVERE PERSISTENT ASTHMA: ICD-10-CM

## 2019-05-07 DIAGNOSIS — E78.2 MIXED HYPERLIPIDEMIA: ICD-10-CM

## 2019-05-07 DIAGNOSIS — I10 ESSENTIAL HYPERTENSION: ICD-10-CM

## 2019-05-07 DIAGNOSIS — F41.1 GENERALIZED ANXIETY DISORDER: ICD-10-CM

## 2019-05-07 NOTE — PROGRESS NOTES
5/7/2019  Spoke to Madelyn for CCM. Updates to patient care team/comments: n/a. Patient reported changes in medications: no changes. Med Adherence  Comment: pt reports taking all medications as prescribed.        Health Maintenance:  Annual Physic with exercise for added benefits and weight management.             Care Manager Follow Up: in 1 month. Reason For Follow Up: review progress and or barriers towards patients goals.      Care Managers Interventions: Continue to provide encouragement, supp

## 2019-05-13 ENCOUNTER — TELEPHONE (OUTPATIENT)
Dept: INTERNAL MEDICINE CLINIC | Facility: CLINIC | Age: 44
End: 2019-05-13

## 2019-05-13 NOTE — TELEPHONE ENCOUNTER
5/3/19 142/102 pulse 102  5/4/19 149/106 pulse 93  5/5/19 142/94  5/6/19  138/98  5/7/19  132/84  5/13/19 135/85     Ok per pt to LMOVM.       Per Dipak Arango continue current HTN medications, keep monitoring and documenting BP daily and f/u in the office in 1 we

## 2019-05-24 ENCOUNTER — OFFICE VISIT (OUTPATIENT)
Dept: INTERNAL MEDICINE CLINIC | Facility: CLINIC | Age: 44
End: 2019-05-24
Payer: MEDICARE

## 2019-05-24 ENCOUNTER — TELEPHONE (OUTPATIENT)
Dept: INTERNAL MEDICINE CLINIC | Facility: CLINIC | Age: 44
End: 2019-05-24

## 2019-05-24 VITALS
HEIGHT: 64 IN | WEIGHT: 243 LBS | DIASTOLIC BLOOD PRESSURE: 104 MMHG | TEMPERATURE: 98 F | SYSTOLIC BLOOD PRESSURE: 160 MMHG | RESPIRATION RATE: 16 BRPM | HEART RATE: 86 BPM | BODY MASS INDEX: 41.48 KG/M2

## 2019-05-24 DIAGNOSIS — G89.29 CHRONIC BILATERAL LOW BACK PAIN WITHOUT SCIATICA: ICD-10-CM

## 2019-05-24 DIAGNOSIS — I10 ESSENTIAL HYPERTENSION: ICD-10-CM

## 2019-05-24 DIAGNOSIS — M54.50 CHRONIC LOW BACK PAIN, UNSPECIFIED BACK PAIN LATERALITY, UNSPECIFIED WHETHER SCIATICA PRESENT: Primary | ICD-10-CM

## 2019-05-24 DIAGNOSIS — G89.29 CHRONIC LOW BACK PAIN, UNSPECIFIED BACK PAIN LATERALITY, UNSPECIFIED WHETHER SCIATICA PRESENT: Primary | ICD-10-CM

## 2019-05-24 DIAGNOSIS — M54.50 CHRONIC BILATERAL LOW BACK PAIN WITHOUT SCIATICA: ICD-10-CM

## 2019-05-24 DIAGNOSIS — F41.1 GENERALIZED ANXIETY DISORDER: ICD-10-CM

## 2019-05-24 PROCEDURE — 99214 OFFICE O/P EST MOD 30 MIN: CPT | Performed by: PHYSICIAN ASSISTANT

## 2019-05-24 RX ORDER — HYDROCODONE BITARTRATE AND ACETAMINOPHEN 10; 325 MG/1; MG/1
1 TABLET ORAL EVERY 6 HOURS PRN
Qty: 90 TABLET | Refills: 0 | Status: SHIPPED | OUTPATIENT
Start: 2019-05-24 | End: 2019-09-07

## 2019-05-24 RX ORDER — DULOXETIN HYDROCHLORIDE 60 MG/1
120 CAPSULE, DELAYED RELEASE ORAL DAILY
Qty: 180 CAPSULE | Refills: 1 | COMMUNITY
Start: 2019-05-24 | End: 2019-08-07

## 2019-05-24 RX ORDER — HYDRALAZINE HYDROCHLORIDE 50 MG/1
100 TABLET, FILM COATED ORAL 3 TIMES DAILY
Qty: 180 TABLET | Refills: 5 | Status: SHIPPED | OUTPATIENT
Start: 2019-05-24 | End: 2019-09-07

## 2019-05-24 NOTE — PATIENT INSTRUCTIONS
Increase cymbalta to 120mg daily and call if any side effects    Blood pressure: increase hydralazine to 100mg three times a day.  Monitor blood pressure daily    Schedule follow-up with Dr. Caron Gutierrez for back pain  Establish with pulmonologist

## 2019-05-24 NOTE — TELEPHONE ENCOUNTER
Patient asking for refill script for Norco - please call when ready for pickup (she forgot to ask at her appt today).

## 2019-05-24 NOTE — PROGRESS NOTES
Juany Tao is a 38year old female. HPI:   Patient presents for recheck of her hypertension. Pt has been taking medications as instructed, no medication side effects, last visit chlorthalidone dose was doubled to 25mg daily.  Home BP edith (50,000 Units total) by mouth once a week. Disp: 12 capsule Rfl: 0   atorvastatin 20 MG Oral Tab Take 1 tablet (20 mg total) by mouth nightly.  Disp: 30 tablet Rfl: 3   VENTOLIN  (90 Base) MCG/ACT Inhalation Aero Soln Inhale 2 puffs into the lungs ev fetal echo 22-24 weeks.     • GENITO-URINARY DISEASE     KIDNEY STONES   • Menorrhagia    • Migraines    • Pain in joint, pelvic region and thigh     in hip   • Pneumonia, organism unspecified(486)    • Psychiatric disorder     ANXIETY      Past Surgical Hi 12/6/2018    Performed by Wilberto Magana MD at [facility] Right 11/15/2018    Performed by Wilberto Magana MD at [address] [place] [address] EPIDURAL STEROID INJECTION MULTIPLE LEVEL Right 4/18/2017    Performed by Carlos Stout MD at [facility] MAIN OR   • TRANSFORAMINAL LUMBAR EPIDURAL STEROID INJECTION MULTIPLE LEVEL Left 4/6/2017    Performed by Carlos Stout MD at [facility] MAIN OR   • [address] pain specialist       reviewed diet, exercise and weight control. Pt reminded to reestablish with pulmonology as well    The patient indicates understanding of these issues and agrees to the plan.   The patient is asked to return in Return in about 3 week

## 2019-05-31 PROCEDURE — 99490 CHRNC CARE MGMT STAFF 1ST 20: CPT

## 2019-06-04 RX ORDER — HYDRALAZINE HYDROCHLORIDE 25 MG/1
TABLET, FILM COATED ORAL
Qty: 540 TABLET | Refills: 1 | Status: SHIPPED | OUTPATIENT
Start: 2019-06-04 | End: 2019-06-18 | Stop reason: DRUGHIGH

## 2019-06-18 ENCOUNTER — OFFICE VISIT (OUTPATIENT)
Dept: INTERNAL MEDICINE CLINIC | Facility: CLINIC | Age: 44
End: 2019-06-18
Payer: MEDICARE

## 2019-06-18 VITALS
TEMPERATURE: 98 F | SYSTOLIC BLOOD PRESSURE: 146 MMHG | RESPIRATION RATE: 18 BRPM | WEIGHT: 238 LBS | HEART RATE: 112 BPM | OXYGEN SATURATION: 94 % | HEIGHT: 64 IN | DIASTOLIC BLOOD PRESSURE: 88 MMHG | BODY MASS INDEX: 40.63 KG/M2

## 2019-06-18 DIAGNOSIS — J45.50 UNCOMPLICATED SEVERE PERSISTENT ASTHMA: ICD-10-CM

## 2019-06-18 DIAGNOSIS — J01.11 ACUTE RECURRENT FRONTAL SINUSITIS: Primary | ICD-10-CM

## 2019-06-18 DIAGNOSIS — E78.2 MIXED HYPERLIPIDEMIA: ICD-10-CM

## 2019-06-18 DIAGNOSIS — I10 ESSENTIAL HYPERTENSION: ICD-10-CM

## 2019-06-18 DIAGNOSIS — J45.51 SEVERE PERSISTENT ASTHMA WITH ACUTE EXACERBATION: ICD-10-CM

## 2019-06-18 PROCEDURE — 99214 OFFICE O/P EST MOD 30 MIN: CPT | Performed by: PHYSICIAN ASSISTANT

## 2019-06-18 RX ORDER — FLUTICASONE PROPIONATE AND SALMETEROL 500; 50 UG/1; UG/1
1 POWDER RESPIRATORY (INHALATION) 2 TIMES DAILY
Qty: 3 EACH | Refills: 3 | Status: SHIPPED | OUTPATIENT
Start: 2019-06-18 | End: 2019-06-21 | Stop reason: ALTCHOICE

## 2019-06-18 RX ORDER — PREDNISONE 20 MG/1
40 TABLET ORAL DAILY
Qty: 14 TABLET | Refills: 0 | Status: SHIPPED | OUTPATIENT
Start: 2019-06-18 | End: 2019-12-11

## 2019-06-18 RX ORDER — AMOXICILLIN AND CLAVULANATE POTASSIUM 875; 125 MG/1; MG/1
1 TABLET, FILM COATED ORAL 2 TIMES DAILY
Qty: 20 TABLET | Refills: 0 | Status: SHIPPED | OUTPATIENT
Start: 2019-06-18 | End: 2019-12-11

## 2019-06-18 NOTE — PATIENT INSTRUCTIONS
Take antibiotic as directed until completely finished. Contact us if you experience any adverse reaction to the medication.    Continue current medications and monitor blood pressure

## 2019-06-18 NOTE — PROGRESS NOTES
Gina Resendiz is a 38year old female. HPI:   Patient presents for recheck of her hypertension.  Pt has been taking medications as instructed, no medication side effects, home BP monitoring in the range of 770-321'V systolic and 09'E diastol puff into the lungs 2 (two) times daily. Disp: 3 each Rfl: 3   Amoxicillin-Pot Clavulanate 875-125 MG Oral Tab Take 1 tablet by mouth 2 (two) times daily for 10 days.  Disp: 20 tablet Rfl: 0   predniSONE 20 MG Oral Tab Take 2 tablets (40 mg total) by mouth (eight) hours as needed for Nausea. Disp: 20 tablet Rfl: 2   ALPRAZolam 0.5 MG Oral Tab Take 1 tablet (0.5 mg total) by mouth 3 (three) times daily as needed for Anxiety.  Disp: 90 tablet Rfl: 0   LOSARTAN 100 MG Oral Tab TAKE 1 TABLET BY MOUTH DAILY Disp: JOINT Left 11/29/2018    Performed by Boo Low MD at [facility] MAIN OR   • [address] Right 11/8/2018    Performed by Boo Low MD at [facility] MAIN OR   • [address] Left 5/8/2018    Per • REMOVAL OF KIDNEY STONE      x3   • SACROILIAC JOINT INJECTION BILATERAL Bilateral 1/9/2017    Performed by Lizett Hale MD at [facility] MAIN OR   • SPINAL CORD STIMULATION TRIAL N/A 4/6/2015    Performed by Lizett Hale MD at [facility] MAIN OR   • SPINAL rashes,no suspicious lesions  HEENT: atraumatic, normocephalic,ears and throat are clear  NECK: supple,no adenopathy,no bruits  LUNGS: clear to auscultation  CARDIO: RRR without murmur  GI: good BS's,no masses, HSM or tenderness  EXTREMITIES: no cyanosis,

## 2019-06-21 DIAGNOSIS — J45.51 SEVERE PERSISTENT ASTHMA WITH ACUTE EXACERBATION: Primary | ICD-10-CM

## 2019-06-21 RX ORDER — BUDESONIDE AND FORMOTEROL FUMARATE DIHYDRATE 80; 4.5 UG/1; UG/1
2 AEROSOL RESPIRATORY (INHALATION) 2 TIMES DAILY
Qty: 3 INHALER | Refills: 3 | OUTPATIENT
Start: 2019-06-21

## 2019-06-21 RX ORDER — BUDESONIDE AND FORMOTEROL FUMARATE DIHYDRATE 160; 4.5 UG/1; UG/1
2 AEROSOL RESPIRATORY (INHALATION) 2 TIMES DAILY
Qty: 3 INHALER | Refills: 3 | Status: SHIPPED | OUTPATIENT
Start: 2019-06-21 | End: 2019-09-07

## 2019-06-21 NOTE — TELEPHONE ENCOUNTER
Fax received from [facility] [employer] for a refill on Fluticasone/Salme 500/50 MCG Disk . Please see fax in triage.

## 2019-06-24 ENCOUNTER — PATIENT OUTREACH (OUTPATIENT)
Dept: CASE MANAGEMENT | Age: 44
End: 2019-06-24

## 2019-06-24 DIAGNOSIS — I10 ESSENTIAL HYPERTENSION: ICD-10-CM

## 2019-06-24 DIAGNOSIS — J45.50 UNCOMPLICATED SEVERE PERSISTENT ASTHMA: ICD-10-CM

## 2019-06-24 DIAGNOSIS — E78.2 MIXED HYPERLIPIDEMIA: ICD-10-CM

## 2019-06-24 DIAGNOSIS — F41.1 GENERALIZED ANXIETY DISORDER: ICD-10-CM

## 2019-06-25 ENCOUNTER — TELEPHONE (OUTPATIENT)
Dept: INTERNAL MEDICINE CLINIC | Facility: CLINIC | Age: 44
End: 2019-06-25

## 2019-06-25 NOTE — PROGRESS NOTES
6/24/2019  Spoke to Lucretia Kamara for CCM. Updates to patient care team/comments: n/a. Patient reported changes in medications: no changes. Med Adherence  Comment: pt reports taking all medications as prescribed. Health Maintenance:    Your Appoin be doing better.     · Active goal from previous outreach: work on diet for better weight management.      Discussed the importance of diet and nutrition along with exercise for added benefits and weight management.      Continue independent living, healthy

## 2019-06-25 NOTE — TELEPHONE ENCOUNTER
Rx for Symbicort 160-4.5 was already sent to the [facility]. The fax was returned with this information.

## 2019-06-30 PROCEDURE — 99490 CHRNC CARE MGMT STAFF 1ST 20: CPT

## 2019-07-25 ENCOUNTER — PATIENT OUTREACH (OUTPATIENT)
Dept: CASE MANAGEMENT | Age: 44
End: 2019-07-25

## 2019-07-25 DIAGNOSIS — E78.2 MIXED HYPERLIPIDEMIA: ICD-10-CM

## 2019-07-25 DIAGNOSIS — J45.50 UNCOMPLICATED SEVERE PERSISTENT ASTHMA: ICD-10-CM

## 2019-07-25 DIAGNOSIS — F41.1 GENERALIZED ANXIETY DISORDER: ICD-10-CM

## 2019-07-25 DIAGNOSIS — I10 ESSENTIAL HYPERTENSION: ICD-10-CM

## 2019-07-25 NOTE — PROGRESS NOTES
7/25/2019  Spoke to Madelyn for CCM. Updates to patient care team/comments: n/a. Patient reported changes in medications: no changes. Med Adherence  Comment: pt reports taking all medications as prescribed. Health Maintenance:    Your Appoin Encounter Total: 24 min medical record review, telephone communication, care plan updates where needed, education, goals and action plan recreation/update. Provided acknowledgment and validation to patient's concerns.    Monthly Minute Total including today

## 2019-07-31 PROCEDURE — 99490 CHRNC CARE MGMT STAFF 1ST 20: CPT

## 2019-08-07 ENCOUNTER — TELEPHONE (OUTPATIENT)
Dept: INTERNAL MEDICINE CLINIC | Facility: CLINIC | Age: 44
End: 2019-08-07

## 2019-08-07 DIAGNOSIS — F41.1 GENERALIZED ANXIETY DISORDER: ICD-10-CM

## 2019-08-07 DIAGNOSIS — G89.29 CHRONIC BILATERAL LOW BACK PAIN WITHOUT SCIATICA: ICD-10-CM

## 2019-08-07 DIAGNOSIS — M54.50 CHRONIC BILATERAL LOW BACK PAIN WITHOUT SCIATICA: ICD-10-CM

## 2019-08-07 RX ORDER — DULOXETIN HYDROCHLORIDE 60 MG/1
120 CAPSULE, DELAYED RELEASE ORAL DAILY
Qty: 180 CAPSULE | Refills: 1 | Status: SHIPPED | OUTPATIENT
Start: 2019-08-07 | End: 2019-09-17

## 2019-08-27 ENCOUNTER — PATIENT OUTREACH (OUTPATIENT)
Dept: CASE MANAGEMENT | Age: 44
End: 2019-08-27

## 2019-08-27 DIAGNOSIS — F41.1 GENERALIZED ANXIETY DISORDER: ICD-10-CM

## 2019-08-27 DIAGNOSIS — J45.50 UNCOMPLICATED SEVERE PERSISTENT ASTHMA: ICD-10-CM

## 2019-08-27 DIAGNOSIS — E78.2 MIXED HYPERLIPIDEMIA: ICD-10-CM

## 2019-08-27 DIAGNOSIS — I10 ESSENTIAL HYPERTENSION: ICD-10-CM

## 2019-08-27 NOTE — PROGRESS NOTES
8/28/2019  Spoke to Wanda Broderick for CCM. Updates to patient care team/comments: n/a. Patient reported changes in medications: no changes. Med Adherence  Comment: pt reports taking all medications as prescribed.        Health Maintenance:   Mammogram exercise for added benefits and weight management.       Continue independent living, healthy diet and exercise routine. Continue to provide encouragement, support, and education for healthy coping and dx. Care Manager Follow Up: in 1 month.    Ronal

## 2019-08-31 PROCEDURE — 99490 CHRNC CARE MGMT STAFF 1ST 20: CPT

## 2019-09-03 ENCOUNTER — PATIENT OUTREACH (OUTPATIENT)
Dept: CASE MANAGEMENT | Age: 44
End: 2019-09-03

## 2019-09-03 NOTE — PROGRESS NOTES
Spoke to pt notified school supplies for her young children will be delivered this week. Understanding verbalized by pt and she did state she'll call back to schedule her follow up she missed with Kendra Henderson, PAC.   Cable man was currently at her home i

## 2019-09-03 NOTE — PROGRESS NOTES
Pt called back left voicemail stating she has scheduled appointment with FRANCE Montes De Oca. Will follow up with pt after visit.    Total time spent with patient including chart review: 5  Time spent with patient this month: 5    Total time spent with comm

## 2019-09-06 ENCOUNTER — TELEPHONE (OUTPATIENT)
Dept: INTERNAL MEDICINE CLINIC | Facility: CLINIC | Age: 44
End: 2019-09-06

## 2019-09-06 NOTE — TELEPHONE ENCOUNTER
Has appt w/ Vina Petties tomorrow morning, but wants to talk over with a nurse first about her blood pressure being consistently too low.

## 2019-09-06 NOTE — TELEPHONE ENCOUNTER
The pt c/o having dizziness and light headedness. The pt has recently moved and is unable to find a BP cuff to measure the reading. The pt does admit to having decreased stress after moving from past spouse.      Losartan 100mg  Amlodipine 10mg   Hydral

## 2019-09-07 ENCOUNTER — OFFICE VISIT (OUTPATIENT)
Dept: INTERNAL MEDICINE CLINIC | Facility: CLINIC | Age: 44
End: 2019-09-07
Payer: MEDICARE

## 2019-09-07 VITALS
DIASTOLIC BLOOD PRESSURE: 100 MMHG | SYSTOLIC BLOOD PRESSURE: 164 MMHG | WEIGHT: 235 LBS | RESPIRATION RATE: 16 BRPM | BODY MASS INDEX: 40.12 KG/M2 | HEIGHT: 64 IN | OXYGEN SATURATION: 97 % | HEART RATE: 78 BPM | TEMPERATURE: 98 F

## 2019-09-07 DIAGNOSIS — J45.50 UNCOMPLICATED SEVERE PERSISTENT ASTHMA: ICD-10-CM

## 2019-09-07 DIAGNOSIS — G89.29 CHRONIC LOW BACK PAIN, UNSPECIFIED BACK PAIN LATERALITY, UNSPECIFIED WHETHER SCIATICA PRESENT: ICD-10-CM

## 2019-09-07 DIAGNOSIS — Z00.00 ENCOUNTER FOR ANNUAL HEALTH EXAMINATION: Primary | ICD-10-CM

## 2019-09-07 DIAGNOSIS — Z12.39 SCREENING FOR MALIGNANT NEOPLASM OF BREAST: ICD-10-CM

## 2019-09-07 DIAGNOSIS — I10 ESSENTIAL HYPERTENSION: ICD-10-CM

## 2019-09-07 DIAGNOSIS — M54.50 CHRONIC LOW BACK PAIN, UNSPECIFIED BACK PAIN LATERALITY, UNSPECIFIED WHETHER SCIATICA PRESENT: ICD-10-CM

## 2019-09-07 DIAGNOSIS — J45.51 SEVERE PERSISTENT ASTHMA WITH ACUTE EXACERBATION: ICD-10-CM

## 2019-09-07 DIAGNOSIS — F41.1 GENERALIZED ANXIETY DISORDER: ICD-10-CM

## 2019-09-07 DIAGNOSIS — E78.5 HYPERLIPIDEMIA, UNSPECIFIED HYPERLIPIDEMIA TYPE: ICD-10-CM

## 2019-09-07 DIAGNOSIS — E78.2 MIXED HYPERLIPIDEMIA: ICD-10-CM

## 2019-09-07 DIAGNOSIS — M46.1 SACROILIITIS (HCC): ICD-10-CM

## 2019-09-07 DIAGNOSIS — Z12.31 VISIT FOR SCREENING MAMMOGRAM: ICD-10-CM

## 2019-09-07 PROCEDURE — 99396 PREV VISIT EST AGE 40-64: CPT | Performed by: NURSE PRACTITIONER

## 2019-09-07 PROCEDURE — G0439 PPPS, SUBSEQ VISIT: HCPCS | Performed by: NURSE PRACTITIONER

## 2019-09-07 PROCEDURE — 96160 PT-FOCUSED HLTH RISK ASSMT: CPT | Performed by: NURSE PRACTITIONER

## 2019-09-07 RX ORDER — HYDRALAZINE HYDROCHLORIDE 50 MG/1
50 TABLET, FILM COATED ORAL 3 TIMES DAILY
Qty: 270 TABLET | Refills: 0 | Status: SHIPPED | OUTPATIENT
Start: 2019-09-07 | End: 2019-11-30

## 2019-09-07 RX ORDER — ATORVASTATIN CALCIUM 20 MG/1
20 TABLET, FILM COATED ORAL NIGHTLY
Qty: 90 TABLET | Refills: 0 | Status: SHIPPED | OUTPATIENT
Start: 2019-09-07 | End: 2019-11-30

## 2019-09-07 RX ORDER — AMLODIPINE BESYLATE 10 MG/1
10 TABLET ORAL
Qty: 90 TABLET | Refills: 0 | Status: SHIPPED | OUTPATIENT
Start: 2019-09-07 | End: 2019-11-30

## 2019-09-07 RX ORDER — BUDESONIDE AND FORMOTEROL FUMARATE DIHYDRATE 160; 4.5 UG/1; UG/1
2 AEROSOL RESPIRATORY (INHALATION) 2 TIMES DAILY
Qty: 3 INHALER | Refills: 3 | Status: SHIPPED | OUTPATIENT
Start: 2019-09-07 | End: 2019-11-01

## 2019-09-07 RX ORDER — HYDROCODONE BITARTRATE AND ACETAMINOPHEN 10; 325 MG/1; MG/1
1 TABLET ORAL EVERY 6 HOURS PRN
Qty: 90 TABLET | Refills: 0 | Status: SHIPPED | OUTPATIENT
Start: 2019-09-07 | End: 2019-12-23

## 2019-09-07 NOTE — PATIENT INSTRUCTIONS
Check fasting labs 6 weeks ago atorvastatin started  Schedule mammogram for October    Marci Medrano's SCREENING SCHEDULE   Tests on this list are recommended by your physician but may not be covered, or covered at this frequency, by your insur only No results found for this or any previous visit.  Limited to patients who meet one of the following criteria:   • Men who are 73-68 years old and have smoked more than 100 cigarettes in their lifetime   • Anyone with a family history    Colorectal Canc 75 Mammogram due on 10/31/2019 Please get this Mammogram regularly   Immunizations      Influenza  Covered Annually Orders placed or performed in visit on 10/03/18   • INFLUENZA VIRUS VACCINE, QUAD, PRESERVATIVE FREE, 0.5 ML   Orders placed or performed in different types of Advance Directives. It also has the State forms available on it's website for anyone to review and print using their home computer and printer. (the forms are also available in [address])  [email or web address] org  This link also has informa

## 2019-09-07 NOTE — PROGRESS NOTES
HPI:   Bret Yoo is a 38year old female who presents for a Medicare Subsequent Annual Wellness visit (Pt already had Initial Annual Wellness).     Moved 1.5 hours away in [place],  feels less stress now that she is no longer with her husb (Internal Medicine)  Freeman Teresa MD as PCP - MSSP  Aloysius Klinefelter, MD as Consulting Physician (Anesthesiology)  Elise Valdez DO (Maternal Fetal Medicine)  Aba Grant MD ([facility])  Riley Noel (Physician Assistant) E, APRN:  VENTOLIN  (90 Base) MCG/ACT Inhalation Aero Soln INHALE 2 PUFFS EVERY 4 (FOUR) HOURS AS NEEDED FOR WHEEZING OR SHORTNESS OF BREATH.    Budesonide-Formoterol Fumarate (SYMBICORT) 160-4.5 MCG/ACT Inhalation Aerosol Inhale 2 puffs into the [facility] appendectomy; removal of kidney stone; other (2012); back surgery (2009); other (1999); other surgical history;  (); tubal ligation (); other (16); d & c (2010);  (); lumbar facet injection(s) (); and a is 40.34 kg/m² as calculated from the following:    Height as of this encounter: 64\". Weight as of this encounter: 235 lb.     Medicare Hearing Assessment  (Required for AWV/SWV)    Finger Rub  passed     Visual Acuity  Right Eye Visual Acuity: Correcte 11/18/2008, 12/17/2010, 10/13/2011, 10/18/2012, 10/09/2015   • Pneumovax 23 11/14/2014, 11/21/2016   • TDAP 01/02/2015   Deferred Date(s) Deferred   • >=3 YRS FLUZONE OR FLUARIX QUAD PRESERVE FREE SINGLE DOSE (03155) FLU CLINIC 10/14/2013        ASSESSMENT Inhalation Aerosol; Inhale 2 puffs into the lungs 2 (two) times daily. Hyperlipidemia, unspecified hyperlipidemia type  -     atorvastatin 20 MG Oral Tab; Take 1 tablet (20 mg total) by mouth nightly.     Chronic low back pain, unspecified back pain late found.     Fasting Blood Sugar (FSB)Annually Glucose (mg/dL)   Date Value   05/01/2019 101 (H)     GLUCOSE (mg/dL)   Date Value   12/29/2013 156 (H)          Cardiovascular Disease Screening     LDL Annually LDL Cholesterol (mg/dL)   Date Value   05/01/2019 vaccine history found Medium/high risk factors:   End-stage renal disease   Hemophiliacs who received Factor VIII or IX concentrates   Clients of institutions for the mentally retarded   Persons who live in the same house as a HepB virus carrier   Homosexu

## 2019-09-11 ENCOUNTER — PATIENT OUTREACH (OUTPATIENT)
Dept: CASE MANAGEMENT | Age: 44
End: 2019-09-11

## 2019-09-11 DIAGNOSIS — I10 ESSENTIAL HYPERTENSION: ICD-10-CM

## 2019-09-11 DIAGNOSIS — J45.50 UNCOMPLICATED SEVERE PERSISTENT ASTHMA: ICD-10-CM

## 2019-09-11 DIAGNOSIS — F41.1 GENERALIZED ANXIETY DISORDER: ICD-10-CM

## 2019-09-11 DIAGNOSIS — E78.2 MIXED HYPERLIPIDEMIA: ICD-10-CM

## 2019-09-11 NOTE — PROGRESS NOTES
9/11/2019  Spoke to Brielle Jacob for CCM. Updates to patient care team/comments: n/a. Patient reported changes in medications: no changes. Med Adherence  Comment: pt reports taking all medications as prescribed.        Health Maintenance:   Influenza V containing the following can work against your goal of a heart-healthy diet. Saturated fats. Avoid high-fat dairy products and animal proteins such as butter, beef, hot dogs, sausage and lopez. Also limit coconut and palm kernel oils. Trans fats.  Avoid t

## 2019-09-17 ENCOUNTER — PATIENT OUTREACH (OUTPATIENT)
Dept: CASE MANAGEMENT | Age: 44
End: 2019-09-17

## 2019-09-17 ENCOUNTER — TELEPHONE (OUTPATIENT)
Dept: INTERNAL MEDICINE CLINIC | Facility: CLINIC | Age: 44
End: 2019-09-17

## 2019-09-17 DIAGNOSIS — F41.1 GENERALIZED ANXIETY DISORDER: ICD-10-CM

## 2019-09-17 DIAGNOSIS — G89.29 CHRONIC BILATERAL LOW BACK PAIN WITHOUT SCIATICA: ICD-10-CM

## 2019-09-17 DIAGNOSIS — M54.50 CHRONIC BILATERAL LOW BACK PAIN WITHOUT SCIATICA: ICD-10-CM

## 2019-09-17 RX ORDER — DULOXETIN HYDROCHLORIDE 60 MG/1
CAPSULE, DELAYED RELEASE ORAL
Qty: 180 CAPSULE | Refills: 1 | COMMUNITY
Start: 2019-09-17 | End: 2020-10-27

## 2019-09-17 NOTE — TELEPHONE ENCOUNTER
Triage: Good Afternoon :)      Pt states she's having trouble sleeping feeling fatigued, sluggish and like she's dragging throughout the day since starting the Atorvastatin.   She also would like to know if she can be put on an antidepressant/anxiety medica

## 2019-09-17 NOTE — TELEPHONE ENCOUNTER
Per Jeremiah Lim, the pt is not to stop the atorvastatin due to the elevated LDL. The pt was advised to start taking the duloxetine 60mg daily for 1 week and then resume 120mg daily. The pt verbalized understanding and did just  the Rx.      The pt reports:

## 2019-09-17 NOTE — PROGRESS NOTES
Returned pt's call states she received a bill for over $200 for CCM program.  Notified pt I discussed this with Britney Burton, Population Management Department and she advised as stated below:   Apparently the charges were never submitted to her insurance

## 2019-09-19 ENCOUNTER — MED REC SCAN ONLY (OUTPATIENT)
Dept: INTERNAL MEDICINE CLINIC | Facility: CLINIC | Age: 44
End: 2019-09-19

## 2019-09-30 PROCEDURE — 99490 CHRNC CARE MGMT STAFF 1ST 20: CPT

## 2019-10-14 ENCOUNTER — TELEPHONE (OUTPATIENT)
Dept: INTERNAL MEDICINE CLINIC | Facility: CLINIC | Age: 44
End: 2019-10-14

## 2019-10-14 NOTE — TELEPHONE ENCOUNTER
Spoke with pt informing her hard area is most likely a hematoma. Pt agrees and is inquiring if she should apply heat or ice. Per Dr. Vizcarra Perez apply heat. Pt expressed understanding.   She also reports her blood pressures have been normal ranging in th

## 2019-10-14 NOTE — TELEPHONE ENCOUNTER
Patient called and couldn't stop laughing, but states she fell on her right buttock. She bruised, and thinks it is going away, but feels \"hard\". This happened 2 weeks ago from Wednesday. Please advise.

## 2019-10-17 ENCOUNTER — HOSPITAL ENCOUNTER (OUTPATIENT)
Dept: MAMMOGRAPHY | Age: 44
Discharge: HOME | End: 2019-10-17
Attending: NURSE PRACTITIONER
Payer: COMMERCIAL

## 2019-10-17 ENCOUNTER — TELEPHONE (OUTPATIENT)
Dept: INTERNAL MEDICINE CLINIC | Facility: CLINIC | Age: 44
End: 2019-10-17

## 2019-10-17 ENCOUNTER — OFFICE VISIT (OUTPATIENT)
Dept: INTERNAL MEDICINE CLINIC | Facility: CLINIC | Age: 44
End: 2019-10-17
Payer: MEDICARE

## 2019-10-17 VITALS
TEMPERATURE: 98 F | DIASTOLIC BLOOD PRESSURE: 92 MMHG | RESPIRATION RATE: 12 BRPM | BODY MASS INDEX: 40.12 KG/M2 | WEIGHT: 235 LBS | HEART RATE: 75 BPM | SYSTOLIC BLOOD PRESSURE: 152 MMHG | HEIGHT: 64 IN

## 2019-10-17 DIAGNOSIS — Z12.31 VISIT FOR SCREENING MAMMOGRAM: ICD-10-CM

## 2019-10-17 DIAGNOSIS — I10 ESSENTIAL HYPERTENSION: ICD-10-CM

## 2019-10-17 DIAGNOSIS — S30.0XXA TRAUMATIC HEMATOMA OF BUTTOCK, INITIAL ENCOUNTER: Primary | ICD-10-CM

## 2019-10-17 PROCEDURE — 77067 SCR MAMMO BI INCL CAD: CPT | Performed by: NURSE PRACTITIONER

## 2019-10-17 PROCEDURE — 77063 BREAST TOMOSYNTHESIS BI: CPT | Performed by: NURSE PRACTITIONER

## 2019-10-17 PROCEDURE — 99213 OFFICE O/P EST LOW 20 MIN: CPT | Performed by: NURSE PRACTITIONER

## 2019-10-17 NOTE — TELEPHONE ENCOUNTER
Patient called and stated she spoke with Lola Smiley on Monday, and her fall pain is getting worse. She thinks it is a blood clot- please advise.

## 2019-10-17 NOTE — TELEPHONE ENCOUNTER
The pt feel on her right buttock 2 weeks ago and was concerned about a possible present hematoma. The pt states can no longer sit or lay on the opposite side of the buttock after the fall. The only thing that is helping the pain is the heating pad.

## 2019-10-17 NOTE — PROGRESS NOTES
HPI:    Patient ID: Juany Tao is a 38year old female. Patient presents with:  Pain: worensing painover the last two weeks on the right side buttox area. room 4       Patient fell down wooded stairs outside when it was slippery.  She fell •  SECTION N/A 2014    Performed by Moira Morales MD at [facility] L+D OR   • CHOLECYSTECTOMY     • D & C  10/2009   • D & C  2010   • LUMBAR FACET INJECTION Left 2017    Performed by Sara Duran MD at [address] • RADIOFREQUENCY ABLATION OF THORACIC OR LUMBAR MEDIAL BRANCH Right 9/5/2017    Performed by Marco Zuleta MD at [facility] MAIN OR   • [address] Left 8/30/2016    Performed by Marco Zuleta MD at [place] [address]. • Hypertension Mother    • Heart Disease Mother 54   • Diabetes Father 36   • Obesity Father    • Cancer Maternal Grandfather         brain; 6/9 of his siblings had brain cancer   • Prostate Cancer Maternal Grandfather    • Prostate Cancer Maternal Uncle atorvastatin 20 MG Oral Tab, Take 1 tablet (20 mg total) by mouth nightly., Disp: 90 tablet, Rfl: 0  hydrALAzine HCl 50 MG Oral Tab, Take 1 tablet (50 mg total) by mouth 3 (three) times daily. , Disp: 270 tablet, Rfl: 0  HYDROcodone-acetaminophen  MG Component Value Date    CHOLEST 293 (H) 05/01/2019    TRIG 244 (H) 05/01/2019    HDL 47 05/01/2019     (H) 05/01/2019    VLDL 49 (H) 05/01/2019    TCHDLRATIO 3.87 12/02/2017    NONHDLC 246 (H) 05/01/2019     Lab Results   Component Value Date    EAG

## 2019-10-21 ENCOUNTER — PATIENT OUTREACH (OUTPATIENT)
Dept: CASE MANAGEMENT | Age: 44
End: 2019-10-21

## 2019-10-29 ENCOUNTER — PATIENT OUTREACH (OUTPATIENT)
Dept: CASE MANAGEMENT | Age: 44
End: 2019-10-29

## 2019-11-01 DIAGNOSIS — J45.51 SEVERE PERSISTENT ASTHMA WITH ACUTE EXACERBATION: ICD-10-CM

## 2019-11-01 RX ORDER — BUDESONIDE AND FORMOTEROL FUMARATE DIHYDRATE 160; 4.5 UG/1; UG/1
AEROSOL RESPIRATORY (INHALATION)
Qty: 30.6 INHALER | Refills: 1 | Status: SHIPPED | OUTPATIENT
Start: 2019-11-01 | End: 2020-09-04

## 2019-11-27 ENCOUNTER — PATIENT OUTREACH (OUTPATIENT)
Dept: CASE MANAGEMENT | Age: 44
End: 2019-11-27

## 2019-11-30 DIAGNOSIS — E78.5 HYPERLIPIDEMIA, UNSPECIFIED HYPERLIPIDEMIA TYPE: ICD-10-CM

## 2019-11-30 DIAGNOSIS — I10 ESSENTIAL HYPERTENSION: ICD-10-CM

## 2019-12-01 RX ORDER — HYDRALAZINE HYDROCHLORIDE 50 MG/1
TABLET, FILM COATED ORAL
Qty: 270 TABLET | Refills: 1 | Status: SHIPPED | OUTPATIENT
Start: 2019-12-01 | End: 2020-01-13

## 2019-12-01 RX ORDER — AMLODIPINE BESYLATE 10 MG/1
TABLET ORAL
Qty: 90 TABLET | Refills: 1 | Status: SHIPPED | OUTPATIENT
Start: 2019-12-01 | End: 2020-05-12

## 2019-12-01 RX ORDER — ATORVASTATIN CALCIUM 20 MG/1
TABLET, FILM COATED ORAL
Qty: 90 TABLET | Refills: 1 | Status: SHIPPED | OUTPATIENT
Start: 2019-12-01 | End: 2020-05-15

## 2019-12-11 ENCOUNTER — TELEPHONE (OUTPATIENT)
Dept: INTERNAL MEDICINE CLINIC | Facility: CLINIC | Age: 44
End: 2019-12-11

## 2019-12-11 DIAGNOSIS — J01.11 ACUTE RECURRENT FRONTAL SINUSITIS: ICD-10-CM

## 2019-12-11 DIAGNOSIS — J45.51 SEVERE PERSISTENT ASTHMA WITH ACUTE EXACERBATION: ICD-10-CM

## 2019-12-11 RX ORDER — AMOXICILLIN AND CLAVULANATE POTASSIUM 875; 125 MG/1; MG/1
1 TABLET, FILM COATED ORAL 2 TIMES DAILY
Qty: 20 TABLET | Refills: 0 | Status: SHIPPED | OUTPATIENT
Start: 2019-12-11 | End: 2019-12-23

## 2019-12-11 RX ORDER — PREDNISONE 20 MG/1
40 TABLET ORAL DAILY
Qty: 14 TABLET | Refills: 0 | Status: SHIPPED | OUTPATIENT
Start: 2019-12-11 | End: 2020-03-30

## 2019-12-11 NOTE — TELEPHONE ENCOUNTER
Patient says she has sinus infection symptoms - sore throat, congestion, facial pressure, and cough. Please c/b to let her know if we can call in a medication for her.

## 2019-12-11 NOTE — TELEPHONE ENCOUNTER
Per Dr. Melissa Sutton Augmentin 875mg BID x 10 days and prednisone 40mg daily x 7 days. Rxs sent. Pt aware.

## 2019-12-13 ENCOUNTER — PATIENT OUTREACH (OUTPATIENT)
Dept: CASE MANAGEMENT | Age: 44
End: 2019-12-13

## 2019-12-22 DIAGNOSIS — J45.50 UNCOMPLICATED SEVERE PERSISTENT ASTHMA: ICD-10-CM

## 2019-12-23 ENCOUNTER — TELEPHONE (OUTPATIENT)
Dept: SURGERY | Facility: CLINIC | Age: 44
End: 2019-12-23

## 2019-12-23 ENCOUNTER — TELEPHONE (OUTPATIENT)
Dept: INTERNAL MEDICINE CLINIC | Facility: CLINIC | Age: 44
End: 2019-12-23

## 2019-12-23 ENCOUNTER — OFFICE VISIT (OUTPATIENT)
Dept: INTERNAL MEDICINE CLINIC | Facility: CLINIC | Age: 44
End: 2019-12-23
Payer: MEDICARE

## 2019-12-23 VITALS
SYSTOLIC BLOOD PRESSURE: 140 MMHG | TEMPERATURE: 98 F | RESPIRATION RATE: 20 BRPM | WEIGHT: 234 LBS | DIASTOLIC BLOOD PRESSURE: 110 MMHG | HEART RATE: 82 BPM | OXYGEN SATURATION: 98 % | BODY MASS INDEX: 40 KG/M2

## 2019-12-23 DIAGNOSIS — F32.A MILD DEPRESSION: ICD-10-CM

## 2019-12-23 DIAGNOSIS — J01.11 ACUTE RECURRENT FRONTAL SINUSITIS: ICD-10-CM

## 2019-12-23 DIAGNOSIS — G89.29 CHRONIC LOW BACK PAIN, UNSPECIFIED BACK PAIN LATERALITY, UNSPECIFIED WHETHER SCIATICA PRESENT: ICD-10-CM

## 2019-12-23 DIAGNOSIS — I10 ESSENTIAL HYPERTENSION: Primary | ICD-10-CM

## 2019-12-23 DIAGNOSIS — J45.50 UNCOMPLICATED SEVERE PERSISTENT ASTHMA: ICD-10-CM

## 2019-12-23 DIAGNOSIS — M54.50 CHRONIC LOW BACK PAIN, UNSPECIFIED BACK PAIN LATERALITY, UNSPECIFIED WHETHER SCIATICA PRESENT: ICD-10-CM

## 2019-12-23 PROCEDURE — 99214 OFFICE O/P EST MOD 30 MIN: CPT | Performed by: PHYSICIAN ASSISTANT

## 2019-12-23 RX ORDER — ALBUTEROL SULFATE 90 UG/1
AEROSOL, METERED RESPIRATORY (INHALATION)
Qty: 18 INHALER | Refills: 5 | Status: SHIPPED | OUTPATIENT
Start: 2019-12-23 | End: 2019-12-23

## 2019-12-23 RX ORDER — ALBUTEROL SULFATE 90 UG/1
AEROSOL, METERED RESPIRATORY (INHALATION)
Qty: 18 INHALER | Refills: 5 | Status: SHIPPED | OUTPATIENT
Start: 2019-12-23 | End: 2020-06-12

## 2019-12-23 RX ORDER — AMOXICILLIN AND CLAVULANATE POTASSIUM 875; 125 MG/1; MG/1
1 TABLET, FILM COATED ORAL 2 TIMES DAILY
Qty: 14 TABLET | Refills: 0 | Status: SHIPPED | OUTPATIENT
Start: 2019-12-23 | End: 2020-03-30

## 2019-12-23 RX ORDER — HYDROCODONE BITARTRATE AND ACETAMINOPHEN 10; 325 MG/1; MG/1
1 TABLET ORAL EVERY 6 HOURS PRN
Qty: 90 TABLET | Refills: 0 | Status: SHIPPED | OUTPATIENT
Start: 2019-12-23 | End: 2020-06-02

## 2019-12-23 NOTE — PATIENT INSTRUCTIONS
Continue current medications  Monitor blood pressure   Continue antibiotic until completely finished

## 2019-12-23 NOTE — TELEPHONE ENCOUNTER
pt called to schedule yearly f/u appt; and questions on her medtron[employer] device. I isaias pt f/u appt on 12/26 @ 10am with Curtis Pearson. Pt also is having trouble with her medtronic device, stated it needs a jumpstart.  Pt called [employer], they told her to ca

## 2019-12-23 NOTE — PROGRESS NOTES
Francoise Foster is a 38year old female. HPI:   Patient presents for recheck of her hypertension.  Pt has been taking medications as instructed, no medication side effects, home BP monitoring in the range of 487'C systolic and 38'A diastolic w mouth every 6 (six) hours as needed for Pain. 90 tablet 0   • Amoxicillin-Pot Clavulanate 875-125 MG Oral Tab Take 1 tablet by mouth 2 (two) times daily for 7 days.  14 tablet 0   • AMLODIPINE BESYLATE 10 MG Oral Tab TAKE 1 TABLET BY MOUTH EVERY DAY 90 tabl 2006     x2   •      •  SECTION N/A 2014    Performed by Sherita Arita MD at [address] [place] [address] L+D OR   • CHOLECYSTECTOMY     • D & C  10/2009   • D & C  2010   • LUMBAR FACET INJECTION Left 2017    Performed by Lizett Hale MD at [facility] at [facility] Right 9/5/2017    Performed by Ana Soto MD at [place] [address]   • [address] [place] [address] Left 8/30/2016    Performed by Silvia Dao Smoking status: Never Smoker      Smokeless tobacco: Never Used    Alcohol use:  Yes      Alcohol/week: 0.0 standard drinks      Frequency: Monthly or less      Comment: rare    Drug use: No        REVIEW OF SYSTEMS:   GENERAL HEALTH: feels well otherwise

## 2019-12-23 NOTE — TELEPHONE ENCOUNTER
Patient called and stated she needs to speak to Alphonso about her cough. She forgot to mention it today at her visit. Please call back, and send RX to [facility] on file.

## 2019-12-23 NOTE — TELEPHONE ENCOUNTER
Pt was requesting cheratussin with codeine. Per Iraida Salcedo due to [facility] use above medication is not recommended. Per Rebecca Muñiz can call tessalon or pt can use OTC Delsym. D/w pt above she reports insurance will not cover tessalon she will use OTC.

## 2019-12-26 ENCOUNTER — OFFICE VISIT (OUTPATIENT)
Dept: PAIN CLINIC | Facility: CLINIC | Age: 44
End: 2019-12-26
Payer: MEDICARE

## 2019-12-26 VITALS
SYSTOLIC BLOOD PRESSURE: 154 MMHG | HEIGHT: 64 IN | DIASTOLIC BLOOD PRESSURE: 96 MMHG | HEART RATE: 87 BPM | OXYGEN SATURATION: 92 % | WEIGHT: 234 LBS | BODY MASS INDEX: 39.95 KG/M2

## 2019-12-26 DIAGNOSIS — M54.16 LUMBAR RADICULOPATHY: Primary | ICD-10-CM

## 2019-12-26 PROCEDURE — 99212 OFFICE O/P EST SF 10 MIN: CPT | Performed by: NURSE PRACTITIONER

## 2019-12-26 NOTE — PROGRESS NOTES
Name: Rui Perez   : 1975   DOS: 2019     Pain Clinic Follow Up Visit:   Rui Perez is a 38year old female who presents for recheck of her chronic low back pain.   She was last seen in our office on , lumbar spine surgeries. Neurologic: Positive for bilateral lower extremity numbness, tinging, and weakness, worse on the right. Denies bowel/bladder incontinence associated with the pain.     Current Outpatient Medications   Medication Sig Dispense Refill Ambulating with a cane. Psychiatric: Appropriate mood. Back: Pain to palpation over lumbar facet joints and sacroiliac joints bilaterally. Straight leg raises are positive bilaterally.    Extremities: Quadriceps strength is 3/5 on the right and 4/5 on th to improve.     Virginia Calero, [place], 12/26/2019, 8:17 AM

## 2019-12-26 NOTE — PROGRESS NOTES
Patient presents in office today with reported pain in middle to lower back, down both legs (primarily the right), numbness and tingling is getting worse (toes are cramping)    Current pain level reported = 6.5-7/10    Last reported dose of HYDROcodone-ace

## 2020-01-10 ENCOUNTER — PATIENT OUTREACH (OUTPATIENT)
Dept: CASE MANAGEMENT | Age: 45
End: 2020-01-10

## 2020-01-13 ENCOUNTER — TELEPHONE (OUTPATIENT)
Dept: INTERNAL MEDICINE CLINIC | Facility: CLINIC | Age: 45
End: 2020-01-13

## 2020-01-13 DIAGNOSIS — I10 ESSENTIAL HYPERTENSION: ICD-10-CM

## 2020-01-13 RX ORDER — HYDRALAZINE HYDROCHLORIDE 50 MG/1
75 TABLET, FILM COATED ORAL 3 TIMES DAILY
COMMUNITY
Start: 2020-01-13 | End: 2020-03-11

## 2020-01-13 NOTE — TELEPHONE ENCOUNTER
Spoke with pt elevated blood pressure yesterday of over 200. She called 911 and was taken to the hospital. While at the hospital readings were 196/110, 194/104 given tylenol, clonidine and lorazepam when she was discharged it was 163/82.     BP this morning

## 2020-01-13 NOTE — TELEPHONE ENCOUNTER
Pt was in the ER last night, her BP was 196/110, she would like to get a higher dose of her anxiety meds,call back

## 2020-01-13 NOTE — TELEPHONE ENCOUNTER
Discussed with patient Mario Parkinson recommendations. Per Kristina Cordoba PA-C increase Hydralazine 75 mg three times a day and call back tomorrow with blood pressure readings.     Patient expressed understanding and will call back tomorrow to update us on blood pressu

## 2020-01-15 ENCOUNTER — TELEPHONE (OUTPATIENT)
Dept: INTERNAL MEDICINE CLINIC | Facility: CLINIC | Age: 45
End: 2020-01-15

## 2020-01-15 NOTE — TELEPHONE ENCOUNTER
Fax from [facility] labeled [facility] treatment authorization request TAR form . Please complete; form in triage.

## 2020-02-03 ENCOUNTER — PATIENT OUTREACH (OUTPATIENT)
Dept: CASE MANAGEMENT | Age: 45
End: 2020-02-03

## 2020-02-13 ENCOUNTER — PATIENT OUTREACH (OUTPATIENT)
Dept: CASE MANAGEMENT | Age: 45
End: 2020-02-13

## 2020-03-11 ENCOUNTER — TELEPHONE (OUTPATIENT)
Dept: INTERNAL MEDICINE CLINIC | Facility: CLINIC | Age: 45
End: 2020-03-11

## 2020-03-11 ENCOUNTER — TELEPHONE (OUTPATIENT)
Dept: ADMINISTRATIVE | Age: 45
End: 2020-03-11

## 2020-03-11 DIAGNOSIS — I10 ESSENTIAL HYPERTENSION: Primary | ICD-10-CM

## 2020-03-11 DIAGNOSIS — I10 ESSENTIAL HYPERTENSION: ICD-10-CM

## 2020-03-11 RX ORDER — HYDRALAZINE HYDROCHLORIDE 100 MG/1
100 TABLET, FILM COATED ORAL 3 TIMES DAILY
Qty: 90 TABLET | Refills: 1 | Status: SHIPPED | OUTPATIENT
Start: 2020-03-11 | End: 2020-05-14

## 2020-03-11 RX ORDER — HYDRALAZINE HYDROCHLORIDE 100 MG/1
100 TABLET, FILM COATED ORAL 3 TIMES DAILY
Qty: 90 TABLET | Refills: 1 | Status: SHIPPED | OUTPATIENT
Start: 2020-03-11 | End: 2020-03-11

## 2020-03-11 NOTE — TELEPHONE ENCOUNTER
Blood pressure way to high     Pain triggering down left arm     Can't call the ambulance due to 5 year has URI     189/119 at 6am this morning     174/under a 100    Yes she did take her blood pressures meds

## 2020-03-11 NOTE — TELEPHONE ENCOUNTER
Pt called back to report she is dizzy and lightheaded. Pt informed this is likely due to her blood pressure returning to normal after being so high.   Advised to push fluids and rest.  She was also instructed to check her blood pressure before her next [address]

## 2020-03-11 NOTE — TELEPHONE ENCOUNTER
Reviewed medications with pt. She has only been taking Hydralazine 50mg TID instead of the 75mg TID that was recommended in January and she is also taking Amlodipine 10mg     Took meds at 6am this morning.      144/78 pulse 101 last night 10pm     187/119 p

## 2020-03-13 ENCOUNTER — MED REC SCAN ONLY (OUTPATIENT)
Dept: INTERNAL MEDICINE CLINIC | Facility: CLINIC | Age: 45
End: 2020-03-13

## 2020-03-17 ENCOUNTER — PATIENT OUTREACH (OUTPATIENT)
Dept: CASE MANAGEMENT | Age: 45
End: 2020-03-17

## 2020-03-24 ENCOUNTER — TELEPHONE (OUTPATIENT)
Dept: INTERNAL MEDICINE CLINIC | Facility: CLINIC | Age: 45
End: 2020-03-24

## 2020-03-24 DIAGNOSIS — J45.50 UNCOMPLICATED SEVERE PERSISTENT ASTHMA: ICD-10-CM

## 2020-03-24 RX ORDER — ALBUTEROL SULFATE 90 UG/1
AEROSOL, METERED RESPIRATORY (INHALATION)
Qty: 18 INHALER | Refills: 5 | OUTPATIENT
Start: 2020-03-24

## 2020-03-24 NOTE — TELEPHONE ENCOUNTER
Spoke with patient regarding recent ED visit for fractured ribs. She reports symptoms are gradually improving, and she's doing deep breathing exercises as instructed    Inquired about pt's BP readings, she states they're \"up and down\".  However c/o feelin

## 2020-03-28 DIAGNOSIS — J45.50 UNCOMPLICATED SEVERE PERSISTENT ASTHMA: ICD-10-CM

## 2020-03-28 RX ORDER — ALBUTEROL SULFATE 90 UG/1
AEROSOL, METERED RESPIRATORY (INHALATION)
Qty: 18 INHALER | Refills: 5 | OUTPATIENT
Start: 2020-03-28

## 2020-03-30 ENCOUNTER — TELEPHONE (OUTPATIENT)
Dept: INTERNAL MEDICINE CLINIC | Facility: CLINIC | Age: 45
End: 2020-03-30

## 2020-03-30 DIAGNOSIS — J45.51 SEVERE PERSISTENT ASTHMA WITH ACUTE EXACERBATION: ICD-10-CM

## 2020-03-30 DIAGNOSIS — J01.11 ACUTE RECURRENT FRONTAL SINUSITIS: ICD-10-CM

## 2020-03-30 RX ORDER — AMOXICILLIN AND CLAVULANATE POTASSIUM 875; 125 MG/1; MG/1
1 TABLET, FILM COATED ORAL 2 TIMES DAILY
Qty: 20 TABLET | Refills: 0 | Status: SHIPPED | OUTPATIENT
Start: 2020-03-30 | End: 2020-04-09

## 2020-03-30 RX ORDER — PREDNISONE 20 MG/1
40 TABLET ORAL DAILY
Qty: 14 TABLET | Refills: 0 | Status: SHIPPED | OUTPATIENT
Start: 2020-03-30 | End: 2020-04-06

## 2020-03-30 NOTE — TELEPHONE ENCOUNTER
Per Remedios Bernabe APN Augmentin 875mg BID x 10 days and Prednisone 40mg daily x 7 days. D/w pt above she expressed understanding. Rxs sent.

## 2020-03-30 NOTE — TELEPHONE ENCOUNTER
Pt is getting a sinus infection    She said she needs prednisone and an antibiotic    Please send to [facility], on file    Call with any questions or concerns.

## 2020-04-02 ENCOUNTER — TELEPHONE (OUTPATIENT)
Dept: INTERNAL MEDICINE CLINIC | Facility: CLINIC | Age: 45
End: 2020-04-02

## 2020-04-02 DIAGNOSIS — R22.42 LOCALIZED SWELLING OF LEFT LOWER LEG: Primary | ICD-10-CM

## 2020-04-02 RX ORDER — FUROSEMIDE 20 MG/1
20 TABLET ORAL DAILY
Qty: 7 TABLET | Refills: 0 | Status: SHIPPED | OUTPATIENT
Start: 2020-04-02 | End: 2021-04-21

## 2020-04-02 NOTE — TELEPHONE ENCOUNTER
Started Monday-Tuesday  Swelling left leg from knee to toes  Ghazal Aldridge PA-C pill and I have had this in past and with exercise and water pill it u. \"    \"It usually happens when I am on the prednisone. \"    Per BYRON Arevalo  Lasix 20 mg 1 tablet daily

## 2020-04-07 ENCOUNTER — PATIENT OUTREACH (OUTPATIENT)
Dept: CASE MANAGEMENT | Age: 45
End: 2020-04-07

## 2020-04-07 DIAGNOSIS — I10 ESSENTIAL HYPERTENSION: ICD-10-CM

## 2020-04-07 DIAGNOSIS — F41.1 GENERALIZED ANXIETY DISORDER: ICD-10-CM

## 2020-04-07 DIAGNOSIS — E78.2 MIXED HYPERLIPIDEMIA: ICD-10-CM

## 2020-04-07 DIAGNOSIS — J45.50 UNCOMPLICATED SEVERE PERSISTENT ASTHMA: ICD-10-CM

## 2020-04-07 RX ORDER — ACETAMINOPHEN AND CODEINE PHOSPHATE 300; 30 MG/1; MG/1
1 TABLET ORAL EVERY 4 HOURS PRN
COMMUNITY
Start: 2020-03-24 | End: 2021-02-02 | Stop reason: ALTCHOICE

## 2020-04-07 RX ORDER — HYDRALAZINE HYDROCHLORIDE 100 MG/1
TABLET, FILM COATED ORAL
Qty: 90 TABLET | Refills: 1 | OUTPATIENT
Start: 2020-04-07

## 2020-04-07 NOTE — PROGRESS NOTES
4/7/2020  Spoke to Madelyn for CCM. Updates to patient care team/comments: n/a   Patient reported changes in medications: no changes. Med Adherence  Comment: pt reports taking all medications as prescribed. Health Maintenance:    Your Appointm telephone communication, care plan updates where needed, education, goals and action plan recreation/update. Provided acknowledgment and validation to patient's concerns.    Monthly Minute Total including today: 39    Physical assessment, complete health hi

## 2020-04-30 PROCEDURE — 99490 CHRNC CARE MGMT STAFF 1ST 20: CPT

## 2020-05-07 DIAGNOSIS — I10 ESSENTIAL HYPERTENSION: ICD-10-CM

## 2020-05-07 RX ORDER — HYDRALAZINE HYDROCHLORIDE 100 MG/1
TABLET, FILM COATED ORAL
Qty: 90 TABLET | Refills: 1 | OUTPATIENT
Start: 2020-05-07

## 2020-05-11 DIAGNOSIS — I10 ESSENTIAL HYPERTENSION: ICD-10-CM

## 2020-05-12 ENCOUNTER — PATIENT OUTREACH (OUTPATIENT)
Dept: CASE MANAGEMENT | Age: 45
End: 2020-05-12

## 2020-05-12 RX ORDER — AMLODIPINE BESYLATE 10 MG/1
TABLET ORAL
Qty: 90 TABLET | Refills: 1 | Status: SHIPPED | OUTPATIENT
Start: 2020-05-12 | End: 2020-11-02

## 2020-05-14 DIAGNOSIS — I10 ESSENTIAL HYPERTENSION: ICD-10-CM

## 2020-05-14 RX ORDER — HYDRALAZINE HYDROCHLORIDE 100 MG/1
100 TABLET, FILM COATED ORAL 3 TIMES DAILY
Qty: 90 TABLET | Refills: 1 | Status: SHIPPED | OUTPATIENT
Start: 2020-05-14 | End: 2020-06-09

## 2020-05-14 NOTE — TELEPHONE ENCOUNTER
[address]  Ph: [phone]  RT:[phone]    Refill Req: Hydralazine 100 MG Tab    Fax in triage

## 2020-05-15 DIAGNOSIS — E78.5 HYPERLIPIDEMIA, UNSPECIFIED HYPERLIPIDEMIA TYPE: ICD-10-CM

## 2020-05-15 DIAGNOSIS — I10 ESSENTIAL HYPERTENSION: ICD-10-CM

## 2020-05-15 RX ORDER — ATORVASTATIN CALCIUM 20 MG/1
TABLET, FILM COATED ORAL
Qty: 90 TABLET | Refills: 1 | Status: SHIPPED | OUTPATIENT
Start: 2020-05-15 | End: 2020-11-02

## 2020-05-15 RX ORDER — HYDRALAZINE HYDROCHLORIDE 50 MG/1
TABLET, FILM COATED ORAL
Qty: 270 TABLET | Refills: 1 | OUTPATIENT
Start: 2020-05-15

## 2020-06-02 DIAGNOSIS — G89.29 CHRONIC LOW BACK PAIN, UNSPECIFIED BACK PAIN LATERALITY, UNSPECIFIED WHETHER SCIATICA PRESENT: ICD-10-CM

## 2020-06-02 DIAGNOSIS — I10 ESSENTIAL HYPERTENSION: ICD-10-CM

## 2020-06-02 DIAGNOSIS — M54.50 CHRONIC LOW BACK PAIN, UNSPECIFIED BACK PAIN LATERALITY, UNSPECIFIED WHETHER SCIATICA PRESENT: ICD-10-CM

## 2020-06-02 RX ORDER — HYDROCODONE BITARTRATE AND ACETAMINOPHEN 10; 325 MG/1; MG/1
1 TABLET ORAL EVERY 6 HOURS PRN
Qty: 90 TABLET | Refills: 0 | Status: SHIPPED | OUTPATIENT
Start: 2020-06-02 | End: 2020-10-02

## 2020-06-02 RX ORDER — HYDRALAZINE HYDROCHLORIDE 50 MG/1
TABLET, FILM COATED ORAL
Qty: 270 TABLET | Refills: 1 | OUTPATIENT
Start: 2020-06-02

## 2020-06-02 NOTE — TELEPHONE ENCOUNTER
Patient called and requested a refill on [place] to be sent to [facility] on file.

## 2020-06-09 DIAGNOSIS — I10 ESSENTIAL HYPERTENSION: ICD-10-CM

## 2020-06-09 RX ORDER — HYDRALAZINE HYDROCHLORIDE 100 MG/1
TABLET, FILM COATED ORAL
Qty: 90 TABLET | Refills: 1 | Status: SHIPPED | OUTPATIENT
Start: 2020-06-09 | End: 2020-07-05

## 2020-06-12 ENCOUNTER — PATIENT OUTREACH (OUTPATIENT)
Dept: CASE MANAGEMENT | Age: 45
End: 2020-06-12

## 2020-06-12 DIAGNOSIS — J45.50 UNCOMPLICATED SEVERE PERSISTENT ASTHMA: ICD-10-CM

## 2020-06-12 DIAGNOSIS — F41.1 GENERALIZED ANXIETY DISORDER: ICD-10-CM

## 2020-06-12 DIAGNOSIS — E78.2 MIXED HYPERLIPIDEMIA: ICD-10-CM

## 2020-06-12 DIAGNOSIS — I10 ESSENTIAL HYPERTENSION: ICD-10-CM

## 2020-06-12 RX ORDER — ALBUTEROL SULFATE 90 UG/1
AEROSOL, METERED RESPIRATORY (INHALATION)
Qty: 18 G | Refills: 5 | Status: SHIPPED | OUTPATIENT
Start: 2020-06-12 | End: 2020-09-04

## 2020-06-12 NOTE — TELEPHONE ENCOUNTER
Spoke to Destin Bower From [address] inquiring if patient had any refills on file. Destin Bower confirmed there was no remaining refills on file. Understanding was expressed.      Last time medication was refilled 12/23/19  Quantity and number of refills 18 g w/ 5  Last

## 2020-06-12 NOTE — PROGRESS NOTES
6/12/2020  Spoke to Lupillo Varela for CCM. Updates to patient care team/comments: n/a. Patient reported changes in medications: no changes. Med Adherence  Comment: pt reports taking all medications as prescribed. Health Maintenance:    Your Appoin encouragement, support, and education for healthy coping and dx. Time Spent This Encounter Total: 29 min medical record review, telephone communication, care plan updates where needed, education, goals and action plan recreation/update.  Provided acknowled

## 2020-06-18 ENCOUNTER — TELEPHONE (OUTPATIENT)
Dept: INTERNAL MEDICINE CLINIC | Facility: CLINIC | Age: 45
End: 2020-06-18

## 2020-06-18 ENCOUNTER — MED REC SCAN ONLY (OUTPATIENT)
Dept: INTERNAL MEDICINE CLINIC | Facility: CLINIC | Age: 45
End: 2020-06-18

## 2020-06-18 NOTE — TELEPHONE ENCOUNTER
Spoke with pt she has the following symptoms:  Lower right back pain radiating around to abdomen, vomiting, elevated BP unable to keep blood pressure medication down and weak. Per Dr. Thierno Mott to ER for evaluation and treatment.     D/w pt above recommenda

## 2020-06-18 NOTE — TELEPHONE ENCOUNTER
5x8 Kidney Stone in the Right Kidney by the opening. Pt is on hydrone      Does she still need to come tomorrow or can she reschedule?

## 2020-06-18 NOTE — TELEPHONE ENCOUNTER
Abdominal right side pain starting from the front radiating to the back. Dizzy, light headed. BP very high.      Please call to [facility]

## 2020-06-18 NOTE — TELEPHONE ENCOUNTER
Spoke with pt she reports driving tomorrow for over an hour for an appt will be difficult. Pt informed we will cx appt and once her kidney stone has passed she can reschedule. Pt does have appt with urology on Saturday. Appt cx.

## 2020-06-30 PROCEDURE — 99490 CHRNC CARE MGMT STAFF 1ST 20: CPT

## 2020-07-04 DIAGNOSIS — I10 ESSENTIAL HYPERTENSION: ICD-10-CM

## 2020-07-05 RX ORDER — HYDRALAZINE HYDROCHLORIDE 100 MG/1
TABLET, FILM COATED ORAL
Qty: 90 TABLET | Refills: 0 | Status: SHIPPED | OUTPATIENT
Start: 2020-07-05 | End: 2020-08-06

## 2020-07-14 ENCOUNTER — PATIENT OUTREACH (OUTPATIENT)
Dept: CASE MANAGEMENT | Age: 45
End: 2020-07-14

## 2020-07-14 DIAGNOSIS — I10 ESSENTIAL HYPERTENSION: ICD-10-CM

## 2020-07-14 DIAGNOSIS — E78.2 MIXED HYPERLIPIDEMIA: ICD-10-CM

## 2020-07-14 DIAGNOSIS — J45.50 UNCOMPLICATED SEVERE PERSISTENT ASTHMA: ICD-10-CM

## 2020-07-14 DIAGNOSIS — F41.1 GENERALIZED ANXIETY DISORDER: ICD-10-CM

## 2020-07-14 RX ORDER — ONDANSETRON 4 MG/1
4 TABLET, ORALLY DISINTEGRATING ORAL
COMMUNITY
Start: 2020-06-18 | End: 2021-02-02 | Stop reason: ALTCHOICE

## 2020-07-14 NOTE — PROGRESS NOTES
7/14/2020  Spoke to Madelyn for CCM. Updates to patient care team/comments: n/a. Patient reported changes in medications: yes, pt reports being on antibiotic.   Together we reviewed and updated medication list.   Med Adherence  Comment: pt reports t importance of diet and nutrition along with exercise for added benefits and weight management.       Continue independent living, healthy diet and exercise routine.  Continue to provide encouragement, support, and education for healthy coping and dx.

## 2020-07-31 PROCEDURE — 99490 CHRNC CARE MGMT STAFF 1ST 20: CPT

## 2020-08-06 DIAGNOSIS — I10 ESSENTIAL HYPERTENSION: ICD-10-CM

## 2020-08-06 RX ORDER — HYDRALAZINE HYDROCHLORIDE 100 MG/1
TABLET, FILM COATED ORAL
Qty: 90 TABLET | Refills: 0 | Status: SHIPPED | OUTPATIENT
Start: 2020-08-06 | End: 2020-08-31

## 2020-08-18 ENCOUNTER — PATIENT OUTREACH (OUTPATIENT)
Dept: CASE MANAGEMENT | Age: 45
End: 2020-08-18

## 2020-08-18 DIAGNOSIS — F41.1 GENERALIZED ANXIETY DISORDER: ICD-10-CM

## 2020-08-18 DIAGNOSIS — E78.2 MIXED HYPERLIPIDEMIA: ICD-10-CM

## 2020-08-18 DIAGNOSIS — J45.50 UNCOMPLICATED SEVERE PERSISTENT ASTHMA: ICD-10-CM

## 2020-08-18 DIAGNOSIS — I10 ESSENTIAL HYPERTENSION: ICD-10-CM

## 2020-08-18 NOTE — PROGRESS NOTES
8/18/2020  Spoke to Madelyn for CCM.       Updates to patient care team/ comments: Katie Archer LPN CCM added  Patient reported changes in medications: None  Med Adherence  Comment: Taking as directed     Health Maintenance: Up to date  Annual Depression - confidence: : 3    Care Manager Follow Up: 1 month  Reason For Follow Up: review progress and or barriers towards patients goals.      Care managers interventions: Introduced myself as new CCM and added to care team. Advised pt ALEKS has locat

## 2020-08-27 ENCOUNTER — TELEPHONE (OUTPATIENT)
Dept: INTERNAL MEDICINE CLINIC | Facility: CLINIC | Age: 45
End: 2020-08-27

## 2020-08-27 NOTE — TELEPHONE ENCOUNTER
Patient called and stated a fax is supposed to arrive today at our office from [place], [place] (urologist's office). Please call her when received, and if not received by tomorrow- please call patient. She is worried about her kidneys.

## 2020-08-28 NOTE — TELEPHONE ENCOUNTER
No medical records received. Pt aware. She does have copy of results and will bring to appt next week.

## 2020-08-30 DIAGNOSIS — I10 ESSENTIAL HYPERTENSION: ICD-10-CM

## 2020-08-31 PROCEDURE — G2058 CCM ADD 20MIN: HCPCS

## 2020-08-31 PROCEDURE — 99490 CHRNC CARE MGMT STAFF 1ST 20: CPT

## 2020-08-31 RX ORDER — HYDRALAZINE HYDROCHLORIDE 100 MG/1
TABLET, FILM COATED ORAL
Qty: 90 TABLET | Refills: 0 | Status: SHIPPED | OUTPATIENT
Start: 2020-08-31 | End: 2020-09-04

## 2020-09-04 ENCOUNTER — LAB ENCOUNTER (OUTPATIENT)
Dept: LAB | Age: 45
End: 2020-09-04
Attending: PHYSICIAN ASSISTANT
Payer: MEDICARE

## 2020-09-04 ENCOUNTER — OFFICE VISIT (OUTPATIENT)
Dept: INTERNAL MEDICINE CLINIC | Facility: CLINIC | Age: 45
End: 2020-09-04
Payer: MEDICARE

## 2020-09-04 VITALS
HEIGHT: 64 IN | OXYGEN SATURATION: 96 % | WEIGHT: 242 LBS | SYSTOLIC BLOOD PRESSURE: 140 MMHG | BODY MASS INDEX: 41.32 KG/M2 | HEART RATE: 100 BPM | RESPIRATION RATE: 18 BRPM | DIASTOLIC BLOOD PRESSURE: 92 MMHG | TEMPERATURE: 99 F

## 2020-09-04 DIAGNOSIS — N83.201 OVARIAN CYST, RIGHT: ICD-10-CM

## 2020-09-04 DIAGNOSIS — I10 ESSENTIAL HYPERTENSION: ICD-10-CM

## 2020-09-04 DIAGNOSIS — J45.50 UNCOMPLICATED SEVERE PERSISTENT ASTHMA: ICD-10-CM

## 2020-09-04 DIAGNOSIS — N20.0 NEPHROLITHIASIS: Primary | ICD-10-CM

## 2020-09-04 DIAGNOSIS — N20.0 NEPHROLITHIASIS: ICD-10-CM

## 2020-09-04 LAB
ALBUMIN SERPL-MCNC: 4 G/DL (ref 3.4–5)
ALBUMIN/GLOB SERPL: 0.8 {RATIO} (ref 1–2)
ALP LIVER SERPL-CCNC: 115 U/L (ref 37–98)
ALT SERPL-CCNC: 18 U/L (ref 13–56)
ANION GAP SERPL CALC-SCNC: 5 MMOL/L (ref 0–18)
AST SERPL-CCNC: 18 U/L (ref 15–37)
BASOPHILS # BLD AUTO: 0.04 X10(3) UL (ref 0–0.2)
BASOPHILS NFR BLD AUTO: 0.4 %
BILIRUB SERPL-MCNC: 0.3 MG/DL (ref 0.1–2)
BUN BLD-MCNC: 15 MG/DL (ref 7–18)
BUN/CREAT SERPL: 12.5 (ref 10–20)
CALCIUM BLD-MCNC: 9.7 MG/DL (ref 8.5–10.1)
CHLORIDE SERPL-SCNC: 103 MMOL/L (ref 98–112)
CO2 SERPL-SCNC: 25 MMOL/L (ref 21–32)
CREAT BLD-MCNC: 1.2 MG/DL (ref 0.55–1.02)
DEPRECATED RDW RBC AUTO: 43.7 FL (ref 35.1–46.3)
EOSINOPHIL # BLD AUTO: 0.02 X10(3) UL (ref 0–0.7)
EOSINOPHIL NFR BLD AUTO: 0.2 %
ERYTHROCYTE [DISTWIDTH] IN BLOOD BY AUTOMATED COUNT: 14.1 % (ref 11–15)
GLOBULIN PLAS-MCNC: 5.1 G/DL (ref 2.8–4.4)
GLUCOSE BLD-MCNC: 113 MG/DL (ref 70–99)
HCT VFR BLD AUTO: 44.9 % (ref 35–48)
HGB BLD-MCNC: 14.6 G/DL (ref 12–16)
IMM GRANULOCYTES # BLD AUTO: 0.06 X10(3) UL (ref 0–1)
IMM GRANULOCYTES NFR BLD: 0.6 %
LYMPHOCYTES # BLD AUTO: 1.28 X10(3) UL (ref 1–4)
LYMPHOCYTES NFR BLD AUTO: 12.1 %
M PROTEIN MFR SERPL ELPH: 9.1 G/DL (ref 6.4–8.2)
MCH RBC QN AUTO: 27.9 PG (ref 26–34)
MCHC RBC AUTO-ENTMCNC: 32.5 G/DL (ref 31–37)
MCV RBC AUTO: 85.9 FL (ref 80–100)
MONOCYTES # BLD AUTO: 0.29 X10(3) UL (ref 0.1–1)
MONOCYTES NFR BLD AUTO: 2.7 %
MULTISTIX LOT#: NORMAL NUMERIC
NEUTROPHILS # BLD AUTO: 8.91 X10 (3) UL (ref 1.5–7.7)
NEUTROPHILS # BLD AUTO: 8.91 X10(3) UL (ref 1.5–7.7)
NEUTROPHILS NFR BLD AUTO: 84 %
OSMOLALITY SERPL CALC.SUM OF ELEC: 278 MOSM/KG (ref 275–295)
PATIENT FASTING Y/N/NP: NO
PH, URINE: 7 (ref 4.5–8)
PLATELET # BLD AUTO: 400 10(3)UL (ref 150–450)
POTASSIUM SERPL-SCNC: 3.9 MMOL/L (ref 3.5–5.1)
RBC # BLD AUTO: 5.23 X10(6)UL (ref 3.8–5.3)
SODIUM SERPL-SCNC: 133 MMOL/L (ref 136–145)
SPECIFIC GRAVITY: 1.02 (ref 1–1.03)
URINE-COLOR: YELLOW
UROBILINOGEN,SEMI-QN: 0.2 MG/DL (ref 0–1.9)
WBC # BLD AUTO: 10.6 X10(3) UL (ref 4–11)

## 2020-09-04 PROCEDURE — 36415 COLL VENOUS BLD VENIPUNCTURE: CPT

## 2020-09-04 PROCEDURE — 3080F DIAST BP >= 90 MM HG: CPT | Performed by: PHYSICIAN ASSISTANT

## 2020-09-04 PROCEDURE — 99214 OFFICE O/P EST MOD 30 MIN: CPT | Performed by: PHYSICIAN ASSISTANT

## 2020-09-04 PROCEDURE — 87086 URINE CULTURE/COLONY COUNT: CPT | Performed by: PHYSICIAN ASSISTANT

## 2020-09-04 PROCEDURE — 80053 COMPREHEN METABOLIC PANEL: CPT

## 2020-09-04 PROCEDURE — 3077F SYST BP >= 140 MM HG: CPT | Performed by: PHYSICIAN ASSISTANT

## 2020-09-04 PROCEDURE — 3008F BODY MASS INDEX DOCD: CPT | Performed by: PHYSICIAN ASSISTANT

## 2020-09-04 PROCEDURE — 81003 URINALYSIS AUTO W/O SCOPE: CPT | Performed by: PHYSICIAN ASSISTANT

## 2020-09-04 PROCEDURE — 85025 COMPLETE CBC W/AUTO DIFF WBC: CPT

## 2020-09-04 RX ORDER — HYDRALAZINE HYDROCHLORIDE 50 MG/1
75 TABLET, FILM COATED ORAL 3 TIMES DAILY
COMMUNITY
Start: 2020-09-04

## 2020-09-04 RX ORDER — CEPHALEXIN 500 MG/1
500 CAPSULE ORAL 3 TIMES DAILY
Qty: 15 CAPSULE | Refills: 0 | Status: SHIPPED | OUTPATIENT
Start: 2020-09-04 | End: 2020-10-20 | Stop reason: ALTCHOICE

## 2020-09-04 RX ORDER — ALBUTEROL SULFATE 90 UG/1
AEROSOL, METERED RESPIRATORY (INHALATION)
Qty: 18 G | Refills: 5 | Status: SHIPPED | OUTPATIENT
Start: 2020-09-04 | End: 2020-12-28

## 2020-09-04 NOTE — PATIENT INSTRUCTIONS
Have blood drawn today and ultrasound done when possible  Take a second dose of hydrochlorothiazide tonight  Continue hydralazine 75mg three times daily and monitor blood pressure.  We will plan to increase hydrochlorothiazide dose if safe based on your blo

## 2020-09-04 NOTE — PROGRESS NOTES
Adeel Workman is a 41year old female. HPI:   Patient presents for recheck of her hypertension. Pt has been taking medications as instructed, no medication side effects, home BP monitoring in the range of 397'E systolic and 17'I diastolic. 12/02/2017    GLU 85 05/17/2017       Current Outpatient Medications   Medication Sig Dispense Refill   • HYDRALAZINE  MG Oral Tab TAKE 1 TABLET BY MOUTH THREE TIMES A DAY 90 tablet 0   • ondansetron 4 MG Oral Tablet Dispersible Take 4 mg by mouth. Laterality Date   • ABLATION  2018    radiofrequency ablation sacral and lumbar, multiple dates, Dr. Filipe Vizcaino   • APPENDECTOMY     • BACK SURGERY  03/17/2010    L4-L5, L5-S1 anterior lumbar interbody fusion   • BACK SURGERY  9/14/2009    L3-4, L4-5, L5 S1 micr Performed by Malka Jeffers MD at [address] [place] [address] [facility] Right 3/13/2018    Performed by Malka Jeffers MD at [address] [place] [address] Left 9/19/201 STEROID INJECTION MULTIPLE LEVEL Right 3/23/2017    Performed by Malka Jeffers MD at [facility] MAIN OR   • TRIGGER POINT INJECTION Bilateral 12/20/2016    Performed by Malka Jeffers MD at [facility] MAIN OR   • TUBAL LIGATION  2014      Social History:    Social severe persistent asthma - mild exacerbation, improving, recently finished antibiotics; monitor, f/u with pulm. .     The patient indicates understanding of these issues and agrees to the plan. Return in about 4 weeks (around 10/2/2020) for follow-up.

## 2020-09-08 ENCOUNTER — TELEPHONE (OUTPATIENT)
Dept: INTERNAL MEDICINE CLINIC | Facility: CLINIC | Age: 45
End: 2020-09-08

## 2020-09-08 DIAGNOSIS — R74.8 ELEVATED ALKALINE PHOSPHATASE LEVEL: Primary | ICD-10-CM

## 2020-09-08 DIAGNOSIS — R77.8 ELEVATED TOTAL PROTEIN: ICD-10-CM

## 2020-09-08 DIAGNOSIS — I10 ESSENTIAL HYPERTENSION: ICD-10-CM

## 2020-09-08 NOTE — TELEPHONE ENCOUNTER
----- Message from Zaid Tavera PA-C sent at 9/7/2020  5:24 PM CDT -----  Please add SPEP if possible d/t elevated serum protein

## 2020-09-08 NOTE — TELEPHONE ENCOUNTER
----- Message from Laya Whitt PA-C sent at 9/8/2020  9:27 AM CDT -----  Please inform pt:  No anemia, urine culture was negative.  Potassium in normal range; kidney function worsened since our last check, have pt complete ultrasounds I ordered for h

## 2020-09-09 RX ORDER — HYDROCHLOROTHIAZIDE 12.5 MG/1
37.5 TABLET ORAL DAILY
Qty: 90 TABLET | Refills: 1 | Status: SHIPPED | OUTPATIENT
Start: 2020-09-09

## 2020-09-09 NOTE — TELEPHONE ENCOUNTER
Spoke with pt regarding her lab results and additional labs needed. She expressed understanding. She reports her blood pressure readings have been 140-150's/80's and at time up to 100 when dealing with her ex .   She also states hydralazine 100mg is

## 2020-09-10 ENCOUNTER — TELEPHONE (OUTPATIENT)
Dept: INTERNAL MEDICINE CLINIC | Facility: CLINIC | Age: 45
End: 2020-09-10

## 2020-09-10 NOTE — TELEPHONE ENCOUNTER
Per ame 9/4/2020 patient will hold off on taking the medication unless she develops dysuria or urgency. pt is to complete imaging.        Pt expresses understanding and will f/u with images

## 2020-09-10 NOTE — TELEPHONE ENCOUNTER
Pt is inquiring if she should take the antibiotic Leslie Zhu perscribed last week. She was waiting to hear back the results for her kidneys first for our nurses to advise. Please call to discuss.

## 2020-09-15 DIAGNOSIS — I10 ESSENTIAL HYPERTENSION: ICD-10-CM

## 2020-09-15 RX ORDER — HYDRALAZINE HYDROCHLORIDE 100 MG/1
100 TABLET, FILM COATED ORAL 3 TIMES DAILY
Qty: 270 TABLET | Refills: 0 | OUTPATIENT
Start: 2020-09-15

## 2020-09-15 NOTE — TELEPHONE ENCOUNTER
[facility]  [place]    Med Req:  hydrALAZINE 100 MG Tab    Qty: 270    Fax in triage

## 2020-09-21 ENCOUNTER — PATIENT OUTREACH (OUTPATIENT)
Dept: CASE MANAGEMENT | Age: 45
End: 2020-09-21

## 2020-09-22 ENCOUNTER — MED REC SCAN ONLY (OUTPATIENT)
Dept: INTERNAL MEDICINE CLINIC | Facility: CLINIC | Age: 45
End: 2020-09-22

## 2020-09-25 ENCOUNTER — TELEPHONE (OUTPATIENT)
Dept: INTERNAL MEDICINE CLINIC | Facility: CLINIC | Age: 45
End: 2020-09-25

## 2020-09-25 DIAGNOSIS — N13.30 HYDRONEPHROSIS OF RIGHT KIDNEY: Primary | ICD-10-CM

## 2020-09-25 NOTE — TELEPHONE ENCOUNTER
Natalie Larkin reviewed U/S reports form [facility]. See scan. D/w pt results and recommendations. She expressed understanding. Repeat U/S renal/bladder placed and mailed to home to complete in 3 months.

## 2020-09-29 ENCOUNTER — TELEPHONE (OUTPATIENT)
Dept: INTERNAL MEDICINE CLINIC | Facility: CLINIC | Age: 45
End: 2020-09-29

## 2020-09-29 DIAGNOSIS — R74.8 ELEVATED ALKALINE PHOSPHATASE LEVEL: Primary | ICD-10-CM

## 2020-09-29 NOTE — TELEPHONE ENCOUNTER
BP #s  137/87   142/79  126/78  132/77  148/81    Refill Req: Norco  Please send to [facility] in [place]     Would like to discuss Blood Work Results

## 2020-09-29 NOTE — TELEPHONE ENCOUNTER
Per Justine Lopez continue same dosing regimen or HTN meds and f/u in office in 1 month. GGT results d/w pt see scan. 4 week f/u scheduled. CMP order mailed to home.

## 2020-10-01 DIAGNOSIS — R74.8 ELEVATED ALKALINE PHOSPHATASE LEVEL: Primary | ICD-10-CM

## 2020-10-01 DIAGNOSIS — I10 ESSENTIAL HYPERTENSION: ICD-10-CM

## 2020-10-01 DIAGNOSIS — R77.8 ELEVATED TOTAL PROTEIN: ICD-10-CM

## 2020-10-02 DIAGNOSIS — G89.29 CHRONIC LOW BACK PAIN, UNSPECIFIED BACK PAIN LATERALITY, UNSPECIFIED WHETHER SCIATICA PRESENT: ICD-10-CM

## 2020-10-02 DIAGNOSIS — M54.50 CHRONIC LOW BACK PAIN, UNSPECIFIED BACK PAIN LATERALITY, UNSPECIFIED WHETHER SCIATICA PRESENT: ICD-10-CM

## 2020-10-02 RX ORDER — HYDROCODONE BITARTRATE AND ACETAMINOPHEN 10; 325 MG/1; MG/1
1 TABLET ORAL EVERY 6 HOURS PRN
Qty: 90 TABLET | Refills: 0 | Status: SHIPPED | OUTPATIENT
Start: 2020-10-02 | End: 2021-03-05

## 2020-10-20 ENCOUNTER — PATIENT OUTREACH (OUTPATIENT)
Dept: CASE MANAGEMENT | Age: 45
End: 2020-10-20

## 2020-10-20 DIAGNOSIS — I10 ESSENTIAL HYPERTENSION: ICD-10-CM

## 2020-10-20 DIAGNOSIS — I87.2 VENOUS INSUFFICIENCY OF BOTH LOWER EXTREMITIES: ICD-10-CM

## 2020-10-20 DIAGNOSIS — E78.2 MIXED HYPERLIPIDEMIA: ICD-10-CM

## 2020-10-20 DIAGNOSIS — F41.1 GENERALIZED ANXIETY DISORDER: ICD-10-CM

## 2020-10-20 DIAGNOSIS — J45.50 UNCOMPLICATED SEVERE PERSISTENT ASTHMA: ICD-10-CM

## 2020-10-20 NOTE — PROGRESS NOTES
10/20/2020  Spoke to Babak Willson for CCM. Updates to patient care team/comments: n/a. Patient reported changes in medications: no changes. Med Adherence  Comment: pt reports taking all medications as prescribed. Health Maintenance:    Your Appo management     · Patient reported progress toward goal: Has limited funds and resources does the best she can      · Update to previous barriers: n/a     · Patient Reported New Barriers And Concerns: None                   - Plan for overcoming all barrier

## 2020-10-23 ENCOUNTER — MED REC SCAN ONLY (OUTPATIENT)
Dept: INTERNAL MEDICINE CLINIC | Facility: CLINIC | Age: 45
End: 2020-10-23

## 2020-10-27 ENCOUNTER — OFFICE VISIT (OUTPATIENT)
Dept: INTERNAL MEDICINE CLINIC | Facility: CLINIC | Age: 45
End: 2020-10-27
Payer: MEDICARE

## 2020-10-27 DIAGNOSIS — F32.A MILD DEPRESSION: ICD-10-CM

## 2020-10-27 DIAGNOSIS — I10 ESSENTIAL HYPERTENSION: Primary | ICD-10-CM

## 2020-10-27 DIAGNOSIS — G89.29 CHRONIC BILATERAL LOW BACK PAIN WITHOUT SCIATICA: ICD-10-CM

## 2020-10-27 DIAGNOSIS — J45.50 UNCOMPLICATED SEVERE PERSISTENT ASTHMA: ICD-10-CM

## 2020-10-27 DIAGNOSIS — F41.1 GENERALIZED ANXIETY DISORDER: ICD-10-CM

## 2020-10-27 DIAGNOSIS — J01.01 ACUTE RECURRENT MAXILLARY SINUSITIS: ICD-10-CM

## 2020-10-27 DIAGNOSIS — M54.50 CHRONIC BILATERAL LOW BACK PAIN WITHOUT SCIATICA: ICD-10-CM

## 2020-10-27 DIAGNOSIS — M46.1 SACROILIITIS (HCC): ICD-10-CM

## 2020-10-27 PROCEDURE — 3008F BODY MASS INDEX DOCD: CPT | Performed by: PHYSICIAN ASSISTANT

## 2020-10-27 PROCEDURE — 99214 OFFICE O/P EST MOD 30 MIN: CPT | Performed by: PHYSICIAN ASSISTANT

## 2020-10-27 PROCEDURE — G0008 ADMIN INFLUENZA VIRUS VAC: HCPCS | Performed by: PHYSICIAN ASSISTANT

## 2020-10-27 PROCEDURE — 3079F DIAST BP 80-89 MM HG: CPT | Performed by: PHYSICIAN ASSISTANT

## 2020-10-27 PROCEDURE — 90686 IIV4 VACC NO PRSV 0.5 ML IM: CPT | Performed by: PHYSICIAN ASSISTANT

## 2020-10-27 PROCEDURE — 3075F SYST BP GE 130 - 139MM HG: CPT | Performed by: PHYSICIAN ASSISTANT

## 2020-10-27 RX ORDER — POTASSIUM CITRATE 15 MEQ/1
1 TABLET, EXTENDED RELEASE ORAL 2 TIMES DAILY
COMMUNITY
Start: 2020-10-08

## 2020-10-27 RX ORDER — AMOXICILLIN AND CLAVULANATE POTASSIUM 875; 125 MG/1; MG/1
1 TABLET, FILM COATED ORAL 2 TIMES DAILY
Qty: 20 TABLET | Refills: 0 | Status: SHIPPED | OUTPATIENT
Start: 2020-10-27 | End: 2020-11-06

## 2020-10-27 RX ORDER — PREDNISONE 20 MG/1
40 TABLET ORAL DAILY
Qty: 14 TABLET | Refills: 0 | Status: SHIPPED | OUTPATIENT
Start: 2020-10-27 | End: 2020-11-03

## 2020-10-27 RX ORDER — DULOXETIN HYDROCHLORIDE 60 MG/1
120 CAPSULE, DELAYED RELEASE ORAL DAILY
Qty: 180 CAPSULE | Refills: 1 | Status: SHIPPED | OUTPATIENT
Start: 2020-10-27 | End: 2021-04-19

## 2020-10-27 NOTE — PATIENT INSTRUCTIONS
Continue current medications and if sinus symptoms worsen, start augmentin and prednisone   Complete blood test on or around 12/29/20

## 2020-10-27 NOTE — PROGRESS NOTES
Juany Tao is a 41year old female. HPI:   Patient presents for recheck of her hypertension.  Pt has been taking medications as instructed, no medication side effects, home BP monitoring in the range of 427-661'T systolic and 49'Z diastol days. 14 tablet 0   • HYDROcodone-acetaminophen  MG Oral Tab Take 1 tablet by mouth every 6 (six) hours as needed for Pain. 90 tablet 0   • hydrochlorothiazide 12.5 MG Oral Tab Take 3 tablets (37.5 mg total) by mouth daily.  90 tablet 1   • Albuterol SURGERY  2009    L3-4, L4-5, L5 S1 microdiscectomy   •   2006     x2   •      •  SECTION N/A 2014    Performed by Vesna Barth MD at [facility] L+D OR   • CHOLECYSTECTOMY     • D & C  10/2009   • D & C  2010   • LUMBAR THORACIC OR LUMBAR MEDIAL BRANCH Left 9/19/2017    Performed by Lizett Hale MD at [facility] MAIN OR   • [address] [address] Right 9/5/2017    Performed by Lizett Hale MD at [address] [place] [address] LIGATION  2014      Social History:    Social History    Tobacco Use      Smoking status: Never Smoker      Smokeless tobacco: Never Used    Alcohol use:  Yes      Alcohol/week: 0.0 standard drinks      Frequency: Monthly or less      Comment: rare    Drug mouth daily. • Amoxicillin-Pot Clavulanate 875-125 MG Oral Tab 20 tablet 0     Sig: Take 1 tablet by mouth 2 (two) times daily for 10 days. • predniSONE 20 MG Oral Tab 14 tablet 0     Sig: Take 2 tablets (40 mg total) by mouth daily for 7 days. [place] Master

## 2020-10-30 VITALS
OXYGEN SATURATION: 100 % | DIASTOLIC BLOOD PRESSURE: 88 MMHG | RESPIRATION RATE: 16 BRPM | BODY MASS INDEX: 42.68 KG/M2 | TEMPERATURE: 98 F | SYSTOLIC BLOOD PRESSURE: 138 MMHG | HEART RATE: 100 BPM | HEIGHT: 64 IN | WEIGHT: 250 LBS

## 2020-10-31 PROCEDURE — 99490 CHRNC CARE MGMT STAFF 1ST 20: CPT

## 2020-11-02 DIAGNOSIS — I10 ESSENTIAL HYPERTENSION: ICD-10-CM

## 2020-11-02 DIAGNOSIS — E78.5 HYPERLIPIDEMIA, UNSPECIFIED HYPERLIPIDEMIA TYPE: ICD-10-CM

## 2020-11-02 RX ORDER — AMLODIPINE BESYLATE 10 MG/1
TABLET ORAL
Qty: 90 TABLET | Refills: 1 | Status: SHIPPED | OUTPATIENT
Start: 2020-11-02 | End: 2021-04-26

## 2020-11-02 RX ORDER — ATORVASTATIN CALCIUM 20 MG/1
TABLET, FILM COATED ORAL
Qty: 90 TABLET | Refills: 1 | Status: SHIPPED | OUTPATIENT
Start: 2020-11-02 | End: 2021-04-26

## 2020-11-03 ENCOUNTER — TELEPHONE (OUTPATIENT)
Dept: INTERNAL MEDICINE CLINIC | Facility: CLINIC | Age: 45
End: 2020-11-03

## 2020-11-03 DIAGNOSIS — N64.89 BREAST ASYMMETRY: Primary | ICD-10-CM

## 2020-11-03 NOTE — TELEPHONE ENCOUNTER
Looking for order for mammogram screening  Also needs order for UT of both breasts    Please call to advise.

## 2020-11-03 NOTE — TELEPHONE ENCOUNTER
Unable to reach BASILIA Casanova as line was busy. Needs call back. Awaiting additional images from recent exams.

## 2020-11-04 NOTE — TELEPHONE ENCOUNTER
Phill Westbrook called again and needs from the doctor the following:    Mammogram Diagnostic of Right Breast  U/S of Right Breast.    Faxed to [phone] as soon as possible. If you have questions call [phone]    PATIENT IS UNAWARE OF THIS.  JUST FAX TO TH

## 2020-11-06 NOTE — TELEPHONE ENCOUNTER
Spoke to [facility] from [facility] in [place] requesting:    Order for right breast ultrasound    And     Right Diagnostic Mammogram     For Right Breast asymmetry per their radiologist readings.      Fax Order to: [phone]    Understanding was ex

## 2020-11-24 ENCOUNTER — PATIENT OUTREACH (OUTPATIENT)
Dept: CASE MANAGEMENT | Age: 45
End: 2020-11-24

## 2020-11-24 DIAGNOSIS — J45.50 UNCOMPLICATED SEVERE PERSISTENT ASTHMA: ICD-10-CM

## 2020-11-24 DIAGNOSIS — F41.1 GENERALIZED ANXIETY DISORDER: ICD-10-CM

## 2020-11-24 DIAGNOSIS — E78.2 MIXED HYPERLIPIDEMIA: ICD-10-CM

## 2020-11-24 DIAGNOSIS — I10 ESSENTIAL HYPERTENSION: ICD-10-CM

## 2020-11-24 DIAGNOSIS — F32.A MILD DEPRESSION: ICD-10-CM

## 2020-11-24 RX ORDER — AMOXICILLIN AND CLAVULANATE POTASSIUM 875; 125 MG/1; MG/1
1 TABLET, FILM COATED ORAL 2 TIMES DAILY
COMMUNITY
End: 2020-12-30 | Stop reason: ALTCHOICE

## 2020-11-24 RX ORDER — PREDNISONE 10 MG/1
10 TABLET ORAL DAILY
COMMUNITY
End: 2020-12-30 | Stop reason: ALTCHOICE

## 2020-11-24 NOTE — PROGRESS NOTES
11/24/2020  Spoke to Franklin Willoughby for CCM. Updates to patient care team/comments: yes, reviewed and updated. Patient reported changes in medications: together we reviewed and updated.     Med Adherence  Comment: pt reports taking all medications as pres all barriers: n/a             Patient agrees to goal action plan.   Self-Management Abilities (patient reported)             1= least confident in achieving goal, 5= very confident               - confidence: 3     Continue independent living, healthy diet

## 2020-11-30 PROCEDURE — 99490 CHRNC CARE MGMT STAFF 1ST 20: CPT

## 2020-12-01 ENCOUNTER — TELEPHONE (OUTPATIENT)
Dept: INTERNAL MEDICINE CLINIC | Facility: CLINIC | Age: 45
End: 2020-12-01

## 2020-12-01 DIAGNOSIS — N60.01 BREAST CYST, RIGHT: Primary | ICD-10-CM

## 2020-12-01 NOTE — TELEPHONE ENCOUNTER
Dipak Core reviewed mammogram dated 11/30. Pt needs repeat diagnostic right breast mammogram in 6 months due to findings. See report. D/w pt results and repeat testing. She expressed understanding. Order mailed to home.

## 2020-12-16 ENCOUNTER — MED REC SCAN ONLY (OUTPATIENT)
Dept: INTERNAL MEDICINE CLINIC | Facility: CLINIC | Age: 45
End: 2020-12-16

## 2020-12-17 ENCOUNTER — TELEPHONE (OUTPATIENT)
Dept: INTERNAL MEDICINE CLINIC | Facility: CLINIC | Age: 45
End: 2020-12-17

## 2020-12-17 NOTE — TELEPHONE ENCOUNTER
Spoke to patient regarding test results. Patient denies any UTI SXS. Patient did just end her menses. Patient is currently on Atorvastatin 20 mg daily. Patient informed to continue a low fat and low cholesterol diet. Understanding was expressed.

## 2020-12-24 ENCOUNTER — PATIENT OUTREACH (OUTPATIENT)
Dept: CASE MANAGEMENT | Age: 45
End: 2020-12-24

## 2020-12-28 DIAGNOSIS — J45.50 UNCOMPLICATED SEVERE PERSISTENT ASTHMA: ICD-10-CM

## 2020-12-28 RX ORDER — ALBUTEROL SULFATE 90 UG/1
AEROSOL, METERED RESPIRATORY (INHALATION)
Qty: 18 INHALER | Refills: 5 | Status: SHIPPED | OUTPATIENT
Start: 2020-12-28 | End: 2021-04-05

## 2020-12-28 NOTE — TELEPHONE ENCOUNTER
Last time medication was refilled 9/4/2020  Quantity and number of refills 18 g w/ 5   Last OV 10/27/2020  Next OV 1/27/21

## 2020-12-30 ENCOUNTER — PATIENT OUTREACH (OUTPATIENT)
Dept: CASE MANAGEMENT | Age: 45
End: 2020-12-30

## 2020-12-30 DIAGNOSIS — I87.2 VENOUS INSUFFICIENCY OF BOTH LOWER EXTREMITIES: ICD-10-CM

## 2020-12-30 DIAGNOSIS — J45.50 UNCOMPLICATED SEVERE PERSISTENT ASTHMA: ICD-10-CM

## 2020-12-30 DIAGNOSIS — I10 ESSENTIAL HYPERTENSION: ICD-10-CM

## 2020-12-30 DIAGNOSIS — F41.1 GENERALIZED ANXIETY DISORDER: ICD-10-CM

## 2020-12-30 DIAGNOSIS — F32.A MILD DEPRESSION: ICD-10-CM

## 2020-12-30 DIAGNOSIS — E78.2 MIXED HYPERLIPIDEMIA: ICD-10-CM

## 2020-12-30 NOTE — PROGRESS NOTES
12/30/2020  Spoke to Madelyn for CCM. Updates to patient care team/comments: UTD. Patient reported changes in medications: together we reviewed with no updates. Med Adherence  Comment: pt reports taking all medications as prescribed.        Healt levels. Discussed good fat's and foods to avoid but don't overdo it, as all fats are high in calories. Foods containing the following can work against your goal of a heart-healthy diet. Saturated fats.  Avoid high-fat dairy products and animal proteins s dx.  Time Spent This Encounter Total: 45 min medical record review, telephone communication, care plan updates where needed, education, goals and action plan recreation/update. Provided acknowledgment and validation to patient's concerns.    Monthly Minute

## 2020-12-31 PROCEDURE — 99490 CHRNC CARE MGMT STAFF 1ST 20: CPT

## 2021-01-11 ENCOUNTER — TELEPHONE (OUTPATIENT)
Dept: INTERNAL MEDICINE CLINIC | Facility: CLINIC | Age: 46
End: 2021-01-11

## 2021-01-11 NOTE — TELEPHONE ENCOUNTER
Fax request from [facility] for verification if appropriate for patient to start on daily asthma control therapy. See fax in triage.

## 2021-01-26 ENCOUNTER — PATIENT OUTREACH (OUTPATIENT)
Dept: CASE MANAGEMENT | Age: 46
End: 2021-01-26

## 2021-02-01 ENCOUNTER — OFFICE VISIT (OUTPATIENT)
Dept: INTERNAL MEDICINE CLINIC | Facility: CLINIC | Age: 46
End: 2021-02-01
Payer: MEDICAID

## 2021-02-01 VITALS
WEIGHT: 248 LBS | HEIGHT: 64 IN | TEMPERATURE: 97 F | HEART RATE: 101 BPM | BODY MASS INDEX: 42.34 KG/M2 | RESPIRATION RATE: 16 BRPM | OXYGEN SATURATION: 97 % | DIASTOLIC BLOOD PRESSURE: 88 MMHG | SYSTOLIC BLOOD PRESSURE: 158 MMHG

## 2021-02-01 DIAGNOSIS — M46.1 SACROILIITIS (HCC): ICD-10-CM

## 2021-02-01 DIAGNOSIS — G89.29 CHRONIC BILATERAL LOW BACK PAIN WITHOUT SCIATICA: ICD-10-CM

## 2021-02-01 DIAGNOSIS — Z00.00 ENCOUNTER FOR ANNUAL HEALTH EXAMINATION: Primary | ICD-10-CM

## 2021-02-01 DIAGNOSIS — I10 ESSENTIAL HYPERTENSION: ICD-10-CM

## 2021-02-01 DIAGNOSIS — F41.1 GENERALIZED ANXIETY DISORDER: ICD-10-CM

## 2021-02-01 DIAGNOSIS — F32.A MILD DEPRESSION: ICD-10-CM

## 2021-02-01 DIAGNOSIS — M54.50 CHRONIC BILATERAL LOW BACK PAIN WITHOUT SCIATICA: ICD-10-CM

## 2021-02-01 DIAGNOSIS — J45.50 UNCOMPLICATED SEVERE PERSISTENT ASTHMA: ICD-10-CM

## 2021-02-01 DIAGNOSIS — I87.2 VENOUS INSUFFICIENCY OF BOTH LOWER EXTREMITIES: ICD-10-CM

## 2021-02-01 DIAGNOSIS — M96.1 LUMBAR POST-LAMINECTOMY SYNDROME: ICD-10-CM

## 2021-02-01 DIAGNOSIS — E78.2 MIXED HYPERLIPIDEMIA: ICD-10-CM

## 2021-02-01 PROCEDURE — 3008F BODY MASS INDEX DOCD: CPT | Performed by: PHYSICIAN ASSISTANT

## 2021-02-01 PROCEDURE — 3077F SYST BP >= 140 MM HG: CPT | Performed by: PHYSICIAN ASSISTANT

## 2021-02-01 PROCEDURE — 3079F DIAST BP 80-89 MM HG: CPT | Performed by: PHYSICIAN ASSISTANT

## 2021-02-01 PROCEDURE — 99396 PREV VISIT EST AGE 40-64: CPT | Performed by: PHYSICIAN ASSISTANT

## 2021-02-01 NOTE — PATIENT INSTRUCTIONS
Track blood pressure once a day and send readings via Tweetflow in 2 weeks.    On 6/1, Have labs drawn, fasting 8-10 hours prior (water before is ok), and diagnostic mammogram  Start Breo inhaler 1 puff once a day    Preeti Aden service except at the [facility] Visit    Abdominal aortic aneurysm screening (once between ages 73-68) IPPE only No results found for this or any previous visit.  Limited to patients who meet one of the following criteria:   • Men who are 65-75 ye flowsheet data found.     Screening Mammogram      Mammogram    Recommend Annually to at least age 76, and as needed after 76 Mammogram due on 10/17/2020 Please get this Mammogram regularly   Immunizations      Influenza  Covered Annually Orders placed or p form from the [facility]. http://[email or web address]/public/books/advin.htm  A link to the [facility].  This site has a lot of good information including definitions of the different types of Advance Dire

## 2021-02-01 NOTE — PROGRESS NOTES
HPI:   Pippa Nunn is a 40year old female who presents for an annual physical    Off of maintenance inhaler x few months d/t insurance change. ACT is 11. Pt admits it's not well controlled.  She has not yet established with a pulmonologist lumbar spinal fusion     Lumbar post-laminectomy syndrome     Mixed hyperlipidemia     MRSA carrier     Uncomplicated severe persistent asthma     BMI 40.0-44.9, adult (HCC)     Venous insufficiency of both lower extremities     Generalized anxiety disorde mouth.    •  Acetaminophen-Codeine #3 300-30 MG Oral Tab, Take 1 tablet by mouth every 4 (four) hours as needed.       •  albuterol sulfate (2.5 MG/3ML) 0.083% Inhalation [place],     •  gabapentin 100 MG Oral Cap, Take 1 capsule (100 mg total) by mouth 3 never used smokeless tobacco. She reports current alcohol use. She reports that she does not use drugs.      REVIEW OF SYSTEMS:   GENERAL: feels well otherwise  SKIN: denies any unusual skin lesions  EYES: denies blurred vision or double vision  HEENT: jose miguel or edema   Pulses: 2+ and symmetric   Skin: Skin color, texture, turgor normal, no rashes or lesions   Lymph nodes: Cervical, supraclavicular, and axillary nodes normal   Neurologic: Normal       Vaccination History     Immunization History   Administered daily. Continue singulair, albuterol and nebs prn. Patient needs maintenance inhaler, start breo 200/25 1 puff daily  Also reemphasized the need to establish with a pulmonologist to discuss other treatment options and improving asthma control.  Pt expresse Cancer Screening      Colonoscopy Screen every 10 years There are no preventive care reminders to display for this patient. Update Health Maintenance if applicable    Flex Sigmoidoscopy Screen every 10 years No results found for this or any previous visit. prescription benefits, but Medicare does not cover unless Medically needed    Zoster  Not covered by Medicare Part B No vaccine history found This may be covered with your pharmacy  prescription benefits      SPECIFIC DISEASE MONITORING Internal Lab or Pro

## 2021-02-05 ENCOUNTER — PATIENT OUTREACH (OUTPATIENT)
Dept: CASE MANAGEMENT | Age: 46
End: 2021-02-05

## 2021-02-05 DIAGNOSIS — F32.A MILD DEPRESSION: ICD-10-CM

## 2021-02-05 DIAGNOSIS — E78.2 MIXED HYPERLIPIDEMIA: ICD-10-CM

## 2021-02-05 DIAGNOSIS — I10 ESSENTIAL HYPERTENSION: ICD-10-CM

## 2021-02-05 DIAGNOSIS — I87.2 VENOUS INSUFFICIENCY OF BOTH LOWER EXTREMITIES: ICD-10-CM

## 2021-02-05 DIAGNOSIS — J45.50 UNCOMPLICATED SEVERE PERSISTENT ASTHMA: ICD-10-CM

## 2021-02-05 DIAGNOSIS — F41.1 GENERALIZED ANXIETY DISORDER: ICD-10-CM

## 2021-02-05 NOTE — PROGRESS NOTES
2/5/2021  Spoke to Madelyn for CCM. Updates to patient care team/comments: UTD. Patient reported changes in medications: together we reviewed with no updates. Med Adherence  Comment: pt reports taking all medications as prescribed.        Health n/a             Patient agrees to goal action plan.   Self-Management Abilities (patient reported)             1= least confident in achieving goal, 5= very confident               - confidence: 3     Continue independent living, healthy diet and exercise r

## 2021-02-25 DIAGNOSIS — J45.50 UNCOMPLICATED SEVERE PERSISTENT ASTHMA: ICD-10-CM

## 2021-02-25 NOTE — TELEPHONE ENCOUNTER
Pt requesting Jevon Owens for persistent asthma, rx request meets protocol, last filled 02/01/2021. Rx sent.

## 2021-02-28 PROCEDURE — 99490 CHRNC CARE MGMT STAFF 1ST 20: CPT

## 2021-03-05 ENCOUNTER — PATIENT OUTREACH (OUTPATIENT)
Dept: CASE MANAGEMENT | Age: 46
End: 2021-03-05

## 2021-03-05 ENCOUNTER — TELEPHONE (OUTPATIENT)
Dept: INTERNAL MEDICINE CLINIC | Facility: CLINIC | Age: 46
End: 2021-03-05

## 2021-03-05 DIAGNOSIS — M54.50 CHRONIC LOW BACK PAIN, UNSPECIFIED BACK PAIN LATERALITY, UNSPECIFIED WHETHER SCIATICA PRESENT: ICD-10-CM

## 2021-03-05 DIAGNOSIS — J45.50 UNCOMPLICATED SEVERE PERSISTENT ASTHMA: ICD-10-CM

## 2021-03-05 DIAGNOSIS — E78.2 MIXED HYPERLIPIDEMIA: ICD-10-CM

## 2021-03-05 DIAGNOSIS — G89.29 CHRONIC LOW BACK PAIN, UNSPECIFIED BACK PAIN LATERALITY, UNSPECIFIED WHETHER SCIATICA PRESENT: ICD-10-CM

## 2021-03-05 DIAGNOSIS — I87.2 VENOUS INSUFFICIENCY OF BOTH LOWER EXTREMITIES: ICD-10-CM

## 2021-03-05 DIAGNOSIS — I10 ESSENTIAL HYPERTENSION: ICD-10-CM

## 2021-03-05 DIAGNOSIS — F41.1 GENERALIZED ANXIETY DISORDER: ICD-10-CM

## 2021-03-05 DIAGNOSIS — F32.A MILD DEPRESSION: ICD-10-CM

## 2021-03-05 RX ORDER — HYDROCODONE BITARTRATE AND ACETAMINOPHEN 10; 325 MG/1; MG/1
1 TABLET ORAL EVERY 6 HOURS PRN
Qty: 90 TABLET | Refills: 0 | Status: SHIPPED | OUTPATIENT
Start: 2021-03-05

## 2021-03-05 NOTE — TELEPHONE ENCOUNTER
Last time medication was refilled 10/2/2020  Quantity and # of refills 90 tab w/ 0 refill  Last OV 2/1/21  Next OV 8/2/21    Pt call and LVM, no Breo samples available at this time.

## 2021-03-05 NOTE — PROGRESS NOTES
3/5/2021  Spoke to Swathi Jones for CCM. Updates to patient care team/comments: UTD. Patient reported changes in medications: together we reviewed with no updates. Med Adherence  Comment: pt reports taking all medications as prescribed.        Health Breo.  Notified I would contact Dr. Adriana Prather office and a nurse will call her back for further assistance. Understanding verbalized by pt.     Telephone encounter sent to [facility]    Self Management Goals/Action Plan:    · Active goal from previous outreach:

## 2021-03-05 NOTE — TELEPHONE ENCOUNTER
Patient called and requested a PA on   HYDROcodone-acetaminophen  MG Oral Tab 90 tablet     . Please call Violetta at [phone]; FAX# [phone]. [facility] will give a 7 day supply, but she called Jennifer Estrada and was told to renew every year.

## 2021-03-05 NOTE — TELEPHONE ENCOUNTER
Triage: Happy Friday :)     Pt requesting refill on Hydrocodone and Breo inhaler sent to [facility] on file. Pt also wondering if Dr. Thierno Mott has any samples of Breo that she can ?   Notified I would contact Dr. Cordell Salvador office and a nurse will call her back fo

## 2021-03-26 ENCOUNTER — TELEPHONE (OUTPATIENT)
Dept: INTERNAL MEDICINE CLINIC | Facility: CLINIC | Age: 46
End: 2021-03-26

## 2021-03-26 DIAGNOSIS — J01.11 ACUTE RECURRENT FRONTAL SINUSITIS: Primary | ICD-10-CM

## 2021-03-26 RX ORDER — AMOXICILLIN AND CLAVULANATE POTASSIUM 875; 125 MG/1; MG/1
1 TABLET, FILM COATED ORAL 2 TIMES DAILY
Qty: 20 TABLET | Refills: 0 | Status: SHIPPED | OUTPATIENT
Start: 2021-03-26 | End: 2021-04-05

## 2021-03-26 RX ORDER — PREDNISONE 20 MG/1
20 TABLET ORAL 2 TIMES DAILY
Qty: 14 TABLET | Refills: 0 | Status: SHIPPED | OUTPATIENT
Start: 2021-03-26 | End: 2021-04-02

## 2021-03-26 NOTE — TELEPHONE ENCOUNTER
Pt is requesting a refill for her antibiotics and sinus medicine. She was unfamiliar with the name. Pt would like orders send to [facility] on file.

## 2021-03-26 NOTE — TELEPHONE ENCOUNTER
Pt c/o sinusitis, symptoms began yesterday 03/25/21, symptoms include sore throat, sinus pressure, runny nose, ear ache, denies fever or cough. Pt has history of maxillary sinusitis with weather changes.    Per fabian Rodriguez to start Augmenten 1 tab BID x10 d

## 2021-03-31 PROCEDURE — 99490 CHRNC CARE MGMT STAFF 1ST 20: CPT

## 2021-04-02 DIAGNOSIS — J45.50 UNCOMPLICATED SEVERE PERSISTENT ASTHMA: ICD-10-CM

## 2021-04-05 ENCOUNTER — TELEPHONE (OUTPATIENT)
Dept: INTERNAL MEDICINE CLINIC | Facility: CLINIC | Age: 46
End: 2021-04-05

## 2021-04-05 RX ORDER — ALBUTEROL SULFATE 90 UG/1
AEROSOL, METERED RESPIRATORY (INHALATION)
Qty: 18 INHALER | Refills: 5 | Status: SHIPPED | OUTPATIENT
Start: 2021-04-05

## 2021-04-05 NOTE — TELEPHONE ENCOUNTER
[facility] Health Form 2021    Form in triage    Please call Pt with any questions    Please mail completed form back to Pt for her records.

## 2021-04-07 ENCOUNTER — PATIENT OUTREACH (OUTPATIENT)
Dept: CASE MANAGEMENT | Age: 46
End: 2021-04-07

## 2021-04-17 DIAGNOSIS — F41.1 GENERALIZED ANXIETY DISORDER: ICD-10-CM

## 2021-04-17 DIAGNOSIS — G89.29 CHRONIC BILATERAL LOW BACK PAIN WITHOUT SCIATICA: ICD-10-CM

## 2021-04-17 DIAGNOSIS — M54.50 CHRONIC BILATERAL LOW BACK PAIN WITHOUT SCIATICA: ICD-10-CM

## 2021-04-19 RX ORDER — DULOXETIN HYDROCHLORIDE 60 MG/1
CAPSULE, DELAYED RELEASE ORAL
Qty: 180 CAPSULE | Refills: 1 | Status: SHIPPED | OUTPATIENT
Start: 2021-04-19

## 2021-04-21 ENCOUNTER — TELEPHONE (OUTPATIENT)
Dept: INTERNAL MEDICINE CLINIC | Facility: CLINIC | Age: 46
End: 2021-04-21

## 2021-04-21 DIAGNOSIS — R22.42 LOCALIZED SWELLING OF LEFT LOWER LEG: ICD-10-CM

## 2021-04-21 RX ORDER — FUROSEMIDE 20 MG/1
20 TABLET ORAL DAILY
Qty: 7 TABLET | Refills: 0 | Status: SHIPPED | OUTPATIENT
Start: 2021-04-21 | End: 2021-04-28

## 2021-04-21 NOTE — TELEPHONE ENCOUNTER
Pt c/o bilateral edema that began 4/12 around feet and ankles. Pt stating she was given a water pill in the past that provided significant relief. Per Chavo Narayan, pt to taking Furosemide 20mg daily for 7 days and f/u if symptoms persists or worsen.  Pt agree

## 2021-04-21 NOTE — TELEPHONE ENCOUNTER
Patient called and asked for a call back because her edema has returned- she is swollen ankles and feet.

## 2021-04-25 DIAGNOSIS — I10 ESSENTIAL HYPERTENSION: ICD-10-CM

## 2021-04-25 DIAGNOSIS — E78.5 HYPERLIPIDEMIA, UNSPECIFIED HYPERLIPIDEMIA TYPE: ICD-10-CM

## 2021-04-26 RX ORDER — ATORVASTATIN CALCIUM 20 MG/1
TABLET, FILM COATED ORAL
Qty: 90 TABLET | Refills: 1 | Status: SHIPPED | OUTPATIENT
Start: 2021-04-26 | End: 2021-10-23

## 2021-04-26 RX ORDER — AMLODIPINE BESYLATE 10 MG/1
TABLET ORAL
Qty: 90 TABLET | Refills: 1 | Status: SHIPPED | OUTPATIENT
Start: 2021-04-26

## 2021-05-03 ENCOUNTER — TELEPHONE (OUTPATIENT)
Dept: INTERNAL MEDICINE CLINIC | Facility: CLINIC | Age: 46
End: 2021-05-03

## 2021-05-03 NOTE — TELEPHONE ENCOUNTER
Pt is requesting orders for her DIAG ROSIE and US of her right breast be faxed over to [facility] outpatient lab. The fax number is [phone].

## 2021-05-13 ENCOUNTER — TELEPHONE (OUTPATIENT)
Dept: INTERNAL MEDICINE CLINIC | Facility: CLINIC | Age: 46
End: 2021-05-13

## 2021-05-13 NOTE — TELEPHONE ENCOUNTER
Pt calling to report she established with a new PCP that is closer to her home. During the visit her blood pressure spiked and they are transporting her via ambulance to the ER.   She just wanted to let our office know if anyone reaches out for medical inf

## 2021-05-17 ENCOUNTER — MED REC SCAN ONLY (OUTPATIENT)
Dept: INTERNAL MEDICINE CLINIC | Facility: CLINIC | Age: 46
End: 2021-05-17

## 2021-07-02 DIAGNOSIS — M54.50 CHRONIC LOW BACK PAIN, UNSPECIFIED BACK PAIN LATERALITY, UNSPECIFIED WHETHER SCIATICA PRESENT: ICD-10-CM

## 2021-07-02 DIAGNOSIS — J45.50 UNCOMPLICATED SEVERE PERSISTENT ASTHMA: ICD-10-CM

## 2021-07-02 DIAGNOSIS — G89.29 CHRONIC LOW BACK PAIN, UNSPECIFIED BACK PAIN LATERALITY, UNSPECIFIED WHETHER SCIATICA PRESENT: ICD-10-CM

## 2021-07-02 RX ORDER — HYDROCODONE BITARTRATE AND ACETAMINOPHEN 10; 325 MG/1; MG/1
1 TABLET ORAL EVERY 6 HOURS PRN
Qty: 90 TABLET | Refills: 0 | Status: CANCELLED | OUTPATIENT
Start: 2021-07-02

## 2021-07-02 RX ORDER — ALBUTEROL SULFATE 90 UG/1
AEROSOL, METERED RESPIRATORY (INHALATION)
Refills: 5 | OUTPATIENT
Start: 2021-07-02

## 2021-07-02 NOTE — TELEPHONE ENCOUNTER
Pt is requesting a rescue inhaler and also a refill on her Norco.  Pt is in the process of switching providers due to location.

## 2021-08-01 DIAGNOSIS — J45.50 UNCOMPLICATED SEVERE PERSISTENT ASTHMA: ICD-10-CM

## 2021-10-23 DIAGNOSIS — E78.5 HYPERLIPIDEMIA, UNSPECIFIED HYPERLIPIDEMIA TYPE: ICD-10-CM

## 2021-10-23 RX ORDER — ATORVASTATIN CALCIUM 20 MG/1
TABLET, FILM COATED ORAL
Qty: 90 TABLET | Refills: 1 | Status: SHIPPED | OUTPATIENT
Start: 2021-10-23

## 2021-11-24 ENCOUNTER — TELEPHONE (OUTPATIENT)
Dept: INTERNAL MEDICINE CLINIC | Facility: CLINIC | Age: 46
End: 2021-11-24

## 2021-11-24 DIAGNOSIS — J45.50 UNCOMPLICATED SEVERE PERSISTENT ASTHMA: ICD-10-CM

## 2022-01-14 DIAGNOSIS — J45.50 UNCOMPLICATED SEVERE PERSISTENT ASTHMA: ICD-10-CM

## 2022-01-14 NOTE — TELEPHONE ENCOUNTER
Last time medication was refilled 11/3/21  Quantity and # of refills 60 each, 3 refills  Last OV 2/1/21  Next OV no apt scheduled

## 2022-04-02 DIAGNOSIS — E78.5 HYPERLIPIDEMIA, UNSPECIFIED HYPERLIPIDEMIA TYPE: ICD-10-CM

## 2022-04-04 RX ORDER — ATORVASTATIN CALCIUM 20 MG/1
TABLET, FILM COATED ORAL
Qty: 90 TABLET | Refills: 1 | OUTPATIENT
Start: 2022-04-04

## 2022-05-09 RX ORDER — ATORVASTATIN CALCIUM 20 MG/1
TABLET, FILM COATED ORAL
Qty: 90 TABLET | Refills: 1 | OUTPATIENT
Start: 2022-05-09

## 2023-06-21 NOTE — H&P
History & Physical Examination    Patient Name: Mamie Daniels  MRN: [id number]  [facility]: [id number]  YOB: 1975    Pre-Operative Diagnosis:  [facility] [M46.96]    Present Illness: A 44year old female with low back Taking   Ibuprofen 200 MG Oral Cap Take by mouth as needed.  Disp:  Rfl:  11/1/2018   Montelukast Sodium (SINGULAIR) 10 MG Oral Tab TAKE 1 TABLET BY MOUTH NIGHTLY (Patient taking differently: TAKE 1 TABLET BY MOUTH morning) Disp: 90 tablet Rfl: 1 Taking Jaw surgery   • OTHER  4/27/16    REPLACEMENT OF SPINAL CORD STIMULATOR GENERATOR   • OTHER SURGICAL HISTORY      MALROTATION   • RADIOFREQUENCY ABLATION OF SACROILIAC JOINT Right 11/8/2018    Performed by Bradley Lobo MD at [facility] MAIN OR   • Genoveva Mcneil by Pranav Wilkinson MD at [address] [place] [address] N/A 4/27/2016    Performed by Sandy Finney MD at [address] [place] [address] N/A 7/15/2015    Performed by Sandy Finney MD at [address] [place] [address] Exam is Normal If not normal, please explain:   HEENT [x ] [x ]    [place] [ ] [ ] Tenderness to palpation right lumbar facet joints.     HEART [x ] [x ]    LUNGS [x ] [x ]    ABDOMEN [x ] [x ]    UROGENITAL [x ] [x ]    EXTREMITIES [x ] [x ]    OTHER Patent

## (undated) DIAGNOSIS — E78.5 HYPERLIPIDEMIA, UNSPECIFIED HYPERLIPIDEMIA TYPE: ICD-10-CM

## (undated) DIAGNOSIS — J45.50 UNCOMPLICATED SEVERE PERSISTENT ASTHMA: ICD-10-CM

## (undated) DEVICE — PAIN TRAY: Brand: MEDLINE INDUSTRIES, INC.

## (undated) DEVICE — GLOVE SURG SENSICARE SZ 7-1/2

## (undated) DEVICE — TOWEL OR BLU 16X26 STRL

## (undated) DEVICE — GLOVE SURG SENSICARE SZ 6-1/2

## (undated) DEVICE — BANDAID COVERLET 1X3

## (undated) DEVICE — DRAPE C-ARM UNIVERSAL

## (undated) DEVICE — RF CANNULA, CVD: Brand: VENOM

## (undated) DEVICE — GLOVE SURG SENSICARE SZ 6

## (undated) DEVICE — NEEDLE SPINAL 22X5 405148

## (undated) DEVICE — REMOVER DURAPREP 3M

## (undated) DEVICE — GAMMEX® PI HYBRID SIZE 7.5, STERILE POWDER-FREE SURGICAL GLOVE, POLYISOPRENE AND NEOPRENE BLEND: Brand: GAMMEX

## (undated) DEVICE — SHEET, T, LAPAROTOMY, STERILE: Brand: MEDLINE

## (undated) DEVICE — GLOVE SURG SENSICARE SZ 7

## (undated) DEVICE — PAD GROUND ELECTRODE 1.5M

## (undated) DEVICE — TOWEL: OR BLU 80/CS: Brand: MEDICAL ACTION INDUSTRIES

## (undated) DEVICE — GAUZE SPONGES,USP TYPE VII GAUZE, 12 PLY: Brand: CURITY

## (undated) DEVICE — CHLORAPREP SWABSTICK 1.7ML

## (undated) NOTE — LETTER
ASTHMA ACTION PLAN for Amalia Gonzalez     : 1975     Date: 2019  Provider:  Victor Apley, PA-C  Phone for doctor or clinic: [address] [place] [phone]

## (undated) NOTE — LETTER
Merced Herrera MD        I acknowledge that I have been informed of the risk involved by refusing the urine pregnancy test on Marci LUPIS Medrano.     I,

## (undated) NOTE — IP AVS SNAPSHOT
[facility] [place] [address] [place] [address], [address] ~ [phone]                Discharge Summary   4/18/2017    [facility]           Admission Information        Provider Department    4/18/2017 Kylie Rodriguez MD  Pr losartan 100 MG Tabs   Commonly known as:  COZAAR        Take 1 tablet (100 mg total) by mouth daily.     Jayde Sas     [    ]    [    ]    [    ]    [    ]       Montelukast Sodium 10 MG Tabs   Commonly known as:  SINGULAIR        TAKE 1 TABLET B hours of the procedure. Contact the physician if you are unable to urinate. This is a temporary condition. 11.  Since you may have some weakness in your legs or arms for a few hours after the procedure, we ask you not to drive for the next 24 hours.  If yo harming yourself, contact [facility] at [phone]. - If you don’t have insurance, Lucian Santillan has partnered with Patient [address] to help you get signed up for insurance coverage.   Patient [place]

## (undated) NOTE — LETTER
Bhavna Head    Dr. Naveed Vines MD        I acknowledge that I have been informed of the risk involved by refusing the pregnancy test   on Marci LUPIS Medrano.     I, the

## (undated) NOTE — MR AVS SNAPSHOT
[address]  [address] [facility] [address]  [phone]               Thank you for choosing us for your health care visit with Jazmin Dumont PA-C.   We are glad to serve you and happy to provide you with CHOLESTEROL (mg/dL)   Date Value   12/14/2015 135*     LDL CHOLESTROL (mg/dL)   Date Value   01/07/2013 146*     CHOLESTEROL, TOTAL (mg/dL)   Date Value   12/14/2015 209*     CHOLESTEROL (mg/dL)   Date Value   01/07/2013 211*     TRIGLYCERIDES (mg/dL)   Da use for medical condition    No results found for this or any previous visit. No flowsheet data found.     Recommended for 2 year anniversary or as ordered in After Visit Summary   Pap and Pelvic      Pap: Every 3 yrs age 21-65 or Pap+HPV every 5 yrs age 27 does not cover unless Medically needed    Zoster (Not covered by Medicare Part B) No orders found for this or any previous visit. This may be covered with your pharmacy  prescription benefits     Recommended Websites for Advanced Directives    http://www. i This list is accurate as of: 1/20/17 12:23 PM.  Always use your most recent med list.                acetaminophen 325 MG Tabs   Take 325 mg by mouth every 6 (six) hours as needed for Pain.    Commonly known as:  TYLENOL           Albuterol Sulfate  ? Install grab bars on the bathroom walls beside the tub, shower and toilet. ? Use a non skid rubber mat in the tub/shower. ? If you are unsteady on your feet you may want to use a shower chair/bench and a hand held shower head while bathing/showering.

## (undated) NOTE — MR AVS SNAPSHOT
[address]  [address] [address] [phone]               Thank you for choosing us for your health care visit with Papi Rai PA-C.   We are glad to serve you and happy to provide you with INHALE 2 PUFFS BY MOUTH EVERY 4 HOURS AS NEEDED FOR WHEEZING OR SHORTNESS OF BREATH   Commonly known as:  VENTOLIN HFA           alprazolam 0.25 MG Tabs   Take 1 tablet (0.25 mg total) by mouth every 6 (six) hours as needed for Anxiety.    Commonly known as discharge instructions in [employer] by going to Visits < Admission Summaries. If you've been to the Emergency Department or your doctor's office, you can view your past visit information in [employer] by going to Visits < Visit Summaries. [employer] questions?

## (undated) NOTE — LETTER
[address] [facility]  [phone]                        August 29, 2018    Jessica Mina  [facility] [address]      Dear Adela Villar: It was a pleasure speaking with you over the phone recently.  To esteban

## (undated) NOTE — MR AVS SNAPSHOT
[address]  [address] [address]  [phone]               Thank you for choosing us for your health care visit with Drew Javed PA-C.   We are glad to serve you and happy to provide you with Albuterol Sulfate  (90 Base) MCG/ACT Aers   INHALE 2 PUFFS BY MOUTH EVERY 4 HOURS AS NEEDED FOR WHEEZING OR SHORTNESS OF BREATH   Commonly known as:  VENTOLIN HFA           ALPRAZolam 0.25 MG Tabs   Take 1 tablet (0.25 mg total) by mouth ever medications prescribed for you. Read the directions carefully, and ask your doctor or other care provider to review them with you.          Where to Get Your Medications      These medications were sent to [facility] [address], [place] [address]

## (undated) NOTE — IP AVS SNAPSHOT
[facility] [place]  [address] [place], [address] ~ [phone]                Discharge Summary   3/23/2017    Nia Forde           Admission Information        Provider Department    3/23/2017 Adele Mariano MD  Pr Take 1 tablet (500 mg total) by mouth every 8 (eight) hours as needed (for muscle spasm).    Stop taking on:  3/24/2017    Sylwia Delgadillo     [    ]    [    ]    [    ]    [    ]       Montelukast Sodium 10 MG Tabs   Commonly known as:  SINGULAIR        TAKE 11.  Since you may have some weakness in your legs or arms for a few hours after the procedure, we ask you not to drive for the next 24 hours.  If you have any questions after the procedure, please call your physician at [phone]  If your doctor is unav Patient [address] to help you get signed up for insurance coverage. Patient [address] is a Federal Navigator program that can help with your Affordable Care Act coverage, as well as all types of Medicaid plans.   To get signed up and covere

## (undated) NOTE — MR AVS SNAPSHOT
[address] [facility]  [address] [facility], [address] [place] [address] [phone]               Thank you for choosing us for your health care visit with Hilda Winchester MD.  We are glad to serve you and happy to provide you with this  protocol for controlled substances:  Written prescriptions    ? Written prescriptions must be picked up in office. ? Please allow the office 48-72 hours to fill the prescription. ? Patient must present photo ID at time of .   If a designat Procedure Time: 9:30AM, 9:00AM, 9:00AM    Prior to the procedure:  Please update us prior to the procedure if you are experiencing any symptoms of infection such as fever, chills, cough, and urinary symptoms.       Day of Procedure:   Do not eat or drink an HERBAL SUPPLEMENTS  5 days  · Fish oil, krill oil, vitamin E                                      Insurance Authorization:   Most insurances are now requiring a preauthorization for all procedures.   In the event that your insurance does not a AmLODIPine Besylate 10 MG Tabs   TAKE 1 TABLET(10 MG) BY MOUTH DAILY   Commonly known as:  NORVASC           chlorthalidone 25 MG Tabs   Take 0.5 tablets (12.5 mg total) by mouth daily.    Commonly known as:  HYGROTEN           HYDROcodone-acetaminophen 10

## (undated) NOTE — LETTER
ASTHMA ACTION PLAN for Gregg Winn     : 1975     Date: 2019  Provider:  FRANCE Ko  Phone for doctor or clinic: [facility], [facility], [facility], [address] [phone]  [phone]

## (undated) NOTE — Clinical Note
2017    Patient: Nia Forde  : 1975 Visit date: 2017    Dear  Dr. Michael Stack MD,    Thank you for referring Nia Forde to my practice. Please find my assessment and plan below.            Assessment   Foreign bod adjacent to the lumbar disc and vertebrae regions there L1, L2, L3, and L4. There are screws present. The patient was known to have a bone forming material placed at the time of that operation.   There appears to be calcifications within the soft tissues exploration of the region that he has minor findings and the above listed CT scan. I will order more views of the spinal region to make sure that there is not any evidence of an obvious radiopaque marker for a laparotomy sponge.   There is a chance alway

## (undated) NOTE — LETTER
Jo Ann Phan MD        I acknowledge that I have been informed of the risk involved by refusing the pregnancy test on [place].     I, there

## (undated) NOTE — LETTER
ASTHMA ACTION PLAN for Fabiano Burkett     : 1975     Date: 2021  Provider:  Sharyle Cheeks, PA-C  Phone for doctor or clinic: [address] [place] [address], [address] [address] [address]  [phone]

## (undated) NOTE — IP AVS SNAPSHOT
[facility] [place] [address] [place] [address], [address] [place] [address] Rd ~ [phone]                Discharge Summary   4/6/2017    Erica Epps           Admission Information        Provider Department    4/6/2017 Shira Ramey MD  Pre- losartan 100 MG Tabs   Commonly known as:  COZAAR        Take 1 tablet (100 mg total) by mouth daily.     Herman Oseguera     [    ]    [    ]    [    ]    [    ]       Montelukast Sodium 10 MG Tabs   Commonly known as:  SINGULAIR        TAKE 1 TABLET B hours of the procedure. Contact the physician if you are unable to urinate. This is a temporary condition. 11.  Since you may have some weakness in your legs or arms for a few hours after the procedure, we ask you not to drive for the next 24 hours.  If yo harming yourself, contact [facility] at [phone]. - If you don’t have insurance, Lucian Santillan has partnered with Patient [address] to help you get signed up for insurance coverage.   Patient [place]

## (undated) NOTE — LETTER
Castillo Soto MD        I acknowledge that I have been informed of the risk involved by refusing the Pregnancy test on [address].     I, there

## (undated) NOTE — LETTER
Jasmin Barbosa MD        I acknowledge that I have been informed of the risk involved by refusing the Pregnancy test on [address].     I, there

## (undated) NOTE — LETTER
ASTHMA ACTION PLAN for Georgina Marino     : 1975     Date: 2018  Provider:  Nany Frey PA-C  Phone for doctor or clinic: [facility] 2, [address] [place] [address], [address] [place] [phone]

## (undated) NOTE — LETTER
ASTHMA ACTION PLAN for Pippa Nunn     : 1975     Date: 1/10/2019  Provider:  FRANCE Salazar  Phone for doctor or clinic: [facility], [facility] 2, [address] [address]  [phone]

## (undated) NOTE — LETTER
[address]ial[address]isa Medrano  [address] [facility] [place] [address]      [id number]      November 1, 2018    Dear Ms. Medrano,    I have recently reviewed your mammogram report from radiology.  I am happy to see that the report was read as normal.  O

## (undated) NOTE — Clinical Note
ASTHMA ACTION PLAN for Erica Epps     : 1975     Date: 3/3/2017  Provider:  Johana Dennis PA-C  Phone for doctor or clinic: [facility] 2, [address] [place] [address], [address] [place] [phone]

## (undated) NOTE — LETTER
18    Patient: Hero Acuna  : 1975 Visit date: 2018    Dear  Dr. Miriam Cid MD,    Thank you for referring Hero Armand to my practice. Please find my assessment and plan below.       44 yr old female seen in this of

## (undated) NOTE — LETTER
ASTHMA ACTION PLAN for Alan Epley     : 1975     Date: 2020  Provider:  Sheila Quiñones, [facility]  Phone for doctor or clinic: [address] [facility]  [address] [facility] [address]  [address] [place] [address] [address] [phone]    ACT Score: Provider  Ronald Hand, [facility]   Patient Caretaker

## (undated) NOTE — MR AVS SNAPSHOT
[facility]  [address] [address] [phone]               Thank you for choosing us for your health care visit with Aislinn Lozano MD.  We are glad to serve you and happy to provide you with this summary of you Her medical history is significant for asthma, chronic pain, history of MRSA, hypertension, obesity. I have personally reviewed his CT scan obtained on November 17, 2016.   It is from an outside institution, however Dr. Ryan Bernard and I reviewed it together there is no ability to palpate the liver or spleen. She has a device in the right midabdomen that is palpable. There are no inguinal, umbilical, or incisional hernias present. Bowel sounds have normal activity and normal pitch.     At this point, no corinna Take 325 mg by mouth every 6 (six) hours as needed for Pain.    Commonly known as:  TYLENOL           Albuterol Sulfate  (90 BASE) MCG/ACT Aers   INHALE 2 PUFFS BY MOUTH EVERY 4 HOURS AS NEEDED FOR WHEEZING OR SHORTNESS OF BREATH   Commonly known as: If you've recently had a stay at the Hospital you can access your discharge instructions in [employer] by going to Visits < Admission Summaries.  If you've been to the Emergency Department or your doctor's office, you can view your past visit information in My

## (undated) NOTE — LETTER
[address] Removal Waiver:    I hereby acknowledge that [facility] staff attempted the following interventions to remove my jewelry:     q  Elevate extremity    q  Ice extremity    q  Use soap & water    q  Use lotions or lubricant    q Spike Medrano   :  1975  MRN:  [id number]  CSN:  [id number]  [address]

## (undated) NOTE — IP AVS SNAPSHOT
[facility] [place] [address] [place], [address] ~ [phone]                Discharge Summary   2/6/2017    Tacho Chen           Admission Information        Provider Department    2/6/2017 Bhupinder Magaña MD  Pre- [    ]    [    ]    [    ]                 Patient Instructions         Dr. Warren Elaine  Post-Procedure Instructions     The name of the procedure performed on you today is: See page 1     It has been a pleasure to have you as our patient.  To help you at home, y Schedule an appointment as soon as possible for a visit with Aloysius Klinefelter, MD.    Specialties:  Anesthesiology, PAIN MANAGEMENT, Pain Medicine, NEUROLOGY    Why:  As needed    Contact information:    [address] [facility] [address]  [address] [place] [address]- can help with your Affordable Care Act coverage, as well as all types of Medicaid plans. To get signed up and covered, please call [phone] and ask to get set up for an insurance coverage that is in-network with Lucian Santillan.         Visit

## (undated) NOTE — MR AVS SNAPSHOT
[address]  [address] [facility] [address] [phone]               Thank you for choosing us for your health care visit with Gordy Kinsey PA-C.   We are glad to serve you and happy to provide you with Encounter for monitoring diuretic therapy [Z51.81, Z79.899]                 Follow-up Instructions     Return for appointment as scheduled.       Scheduling Instructions     Wednesday May 17, 2017     Imaging:  US VENOUS DOPPLER LEG LEFT - DIAG IMG (CPT=939 TAKE 1 TABLET BY MOUTH NIGHTLY   What changed:  See the new instructions.    Commonly known as:  SINGULAIR           SPIRIVA RESPIMAT 1.25 MCG/ACT Aers   Generic drug:  Tiotropium Bromide Monohydrate   INL 2 PFS PO QD                   Results of Recent Nasra

## (undated) NOTE — LETTER
18    Patient: Michelle Swan  : 1975 Visit date: 2018    Dear  Dr. Rogers Sullivan MD,    Thank you for referring Michelle Beny to my practice. Please find my assessment and plan below.        Assessment   Nevus  (primary en

## (undated) NOTE — LETTER
Herchel Seip Dr. Hollie Massa, MD        I acknowledge that I have been informed of the risk involved by refusing the pregnancy test on [address].     I, there